# Patient Record
Sex: FEMALE | Race: WHITE | Employment: OTHER | URBAN - METROPOLITAN AREA
[De-identification: names, ages, dates, MRNs, and addresses within clinical notes are randomized per-mention and may not be internally consistent; named-entity substitution may affect disease eponyms.]

---

## 2017-01-01 ENCOUNTER — HOSPITAL ENCOUNTER (OUTPATIENT)
Dept: CT IMAGING | Age: 72
Discharge: HOME | End: 2017-10-03
Attending: INTERNAL MEDICINE
Payer: MEDICARE

## 2017-01-01 ENCOUNTER — HOSPITAL ENCOUNTER (OUTPATIENT)
Dept: LAB | Age: 72
Discharge: HOME | End: 2017-09-26
Payer: MEDICARE

## 2017-01-01 ENCOUNTER — PATIENT OUTREACH (OUTPATIENT)
Dept: CASE MANAGEMENT | Age: 72
End: 2017-01-01

## 2017-01-01 ENCOUNTER — HOSPITAL ENCOUNTER (OUTPATIENT)
Dept: LAB | Age: 72
Discharge: HOME | End: 2017-10-12
Payer: MEDICARE

## 2017-01-01 ENCOUNTER — HOSPITAL ENCOUNTER (OUTPATIENT)
Dept: LAB | Age: 72
Discharge: HOME | End: 2017-11-02
Payer: MEDICARE

## 2017-01-01 ENCOUNTER — HOSPITAL ENCOUNTER (OUTPATIENT)
Dept: LAB | Age: 72
Discharge: HOME | End: 2017-11-21
Payer: MEDICARE

## 2017-01-01 ENCOUNTER — HOSPITAL ENCOUNTER (OUTPATIENT)
Dept: LAB | Age: 72
Discharge: HOME | End: 2017-12-12
Payer: MEDICARE

## 2017-01-01 ENCOUNTER — HOSPITAL ENCOUNTER (OUTPATIENT)
Dept: INFUSION THERAPY | Age: 72
Discharge: HOME | End: 2017-12-12
Payer: MEDICARE

## 2017-01-01 ENCOUNTER — APPOINTMENT (OUTPATIENT)
Dept: INFUSION THERAPY | Age: 72
End: 2017-01-01
Payer: MEDICARE

## 2017-01-01 ENCOUNTER — HOSPITAL ENCOUNTER (OUTPATIENT)
Dept: LAB | Age: 72
Discharge: HOME | End: 2017-08-29
Payer: MEDICARE

## 2017-01-01 ENCOUNTER — HOSPITAL ENCOUNTER (OUTPATIENT)
Dept: LAB | Age: 72
Discharge: HOME | End: 2017-08-15
Payer: MEDICARE

## 2017-01-01 ENCOUNTER — HOSPITAL ENCOUNTER (OUTPATIENT)
Dept: INFUSION THERAPY | Age: 72
Discharge: HOME | End: 2017-08-29
Payer: MEDICARE

## 2017-01-01 ENCOUNTER — HOSPITAL ENCOUNTER (OUTPATIENT)
Dept: INFUSION THERAPY | Age: 72
Discharge: HOME | End: 2017-11-21
Payer: MEDICARE

## 2017-01-01 ENCOUNTER — HOSPITAL ENCOUNTER (OUTPATIENT)
Dept: INFUSION THERAPY | Age: 72
Discharge: HOME | End: 2017-09-26
Payer: MEDICARE

## 2017-01-01 ENCOUNTER — HOSPITAL ENCOUNTER (OUTPATIENT)
Dept: INFUSION THERAPY | Age: 72
Discharge: HOME | End: 2017-10-12
Payer: MEDICARE

## 2017-01-01 ENCOUNTER — HOSPITAL ENCOUNTER (OUTPATIENT)
Dept: INFUSION THERAPY | Age: 72
Discharge: HOME | End: 2017-11-02
Payer: MEDICARE

## 2017-01-01 ENCOUNTER — HOSPITAL ENCOUNTER (OUTPATIENT)
Dept: CT IMAGING | Age: 72
Discharge: HOME | End: 2017-12-04
Attending: INTERNAL MEDICINE
Payer: MEDICARE

## 2017-01-01 ENCOUNTER — HOSPITAL ENCOUNTER (OUTPATIENT)
Dept: INFUSION THERAPY | Age: 72
Discharge: HOME | End: 2017-08-22
Payer: MEDICARE

## 2017-01-01 ENCOUNTER — HOSPITAL ENCOUNTER (OUTPATIENT)
Dept: LAB | Age: 72
Discharge: HOME | End: 2017-09-12
Payer: MEDICARE

## 2017-01-01 ENCOUNTER — HOSPITAL ENCOUNTER (OUTPATIENT)
Dept: INFUSION THERAPY | Age: 72
Discharge: HOME | End: 2017-09-12
Payer: MEDICARE

## 2017-01-01 ENCOUNTER — HOSPITAL ENCOUNTER (OUTPATIENT)
Dept: INFUSION THERAPY | Age: 72
Discharge: HOME | End: 2017-08-15
Payer: MEDICARE

## 2017-01-01 ENCOUNTER — HOSPITAL ENCOUNTER (OUTPATIENT)
Dept: LAB | Age: 72
Discharge: HOME | End: 2017-08-22
Payer: MEDICARE

## 2017-01-01 VITALS — WEIGHT: 139 LBS | HEIGHT: 64 IN | BODY MASS INDEX: 23.73 KG/M2

## 2017-01-01 VITALS — OXYGEN SATURATION: 96 %

## 2017-01-01 DIAGNOSIS — C34.91 NON-SMALL CELL CANCER OF RIGHT LUNG (HCC): ICD-10-CM

## 2017-01-01 DIAGNOSIS — R11.0 NAUSEA: ICD-10-CM

## 2017-01-01 DIAGNOSIS — C34.90 NON-SMALL CELL LUNG CANCER, UNSPECIFIED LATERALITY (HCC): Chronic | ICD-10-CM

## 2017-01-01 DIAGNOSIS — E87.6 HYPOKALEMIA: ICD-10-CM

## 2017-01-01 DIAGNOSIS — C34.91 NON-SMALL CELL LUNG CANCER, RIGHT (HCC): ICD-10-CM

## 2017-01-01 DIAGNOSIS — C34.91 NON-SMALL CELL CANCER OF RIGHT LUNG (HCC): Chronic | ICD-10-CM

## 2017-01-01 DIAGNOSIS — C34.91 NON-SMALL CELL LUNG CANCER, RIGHT (HCC): Chronic | ICD-10-CM

## 2017-01-01 DIAGNOSIS — C34.90 MALIGNANT NEOPLASM OF LUNG, UNSPECIFIED LATERALITY, UNSPECIFIED PART OF LUNG (HCC): ICD-10-CM

## 2017-01-01 LAB
ALBUMIN SERPL BCP-MCNC: 3.1 G/DL (ref 3.2–4.6)
ALBUMIN SERPL-MCNC: 2.9 G/DL (ref 3.2–4.6)
ALBUMIN SERPL-MCNC: 3 G/DL (ref 3.2–4.6)
ALBUMIN SERPL-MCNC: 3 G/DL (ref 3.2–4.6)
ALBUMIN SERPL-MCNC: 3.1 G/DL (ref 3.2–4.6)
ALBUMIN SERPL-MCNC: 3.2 G/DL (ref 3.2–4.6)
ALBUMIN SERPL-MCNC: 3.2 G/DL (ref 3.2–4.6)
ALBUMIN/GLOB SERPL: 0.9 {RATIO} (ref 1.2–3.5)
ALBUMIN/GLOB SERPL: 1 {RATIO} (ref 1.2–3.5)
ALP SERPL-CCNC: 72 U/L (ref 50–136)
ALP SERPL-CCNC: 72 U/L (ref 50–136)
ALP SERPL-CCNC: 73 U/L (ref 50–136)
ALP SERPL-CCNC: 77 U/L (ref 50–136)
ALP SERPL-CCNC: 79 U/L (ref 50–136)
ALP SERPL-CCNC: 79 U/L (ref 50–136)
ALP SERPL-CCNC: 82 U/L (ref 50–136)
ALP SERPL-CCNC: 82 U/L (ref 50–136)
ALP SERPL-CCNC: 87 U/L (ref 50–136)
ALT SERPL-CCNC: 17 U/L (ref 12–65)
ALT SERPL-CCNC: 20 U/L (ref 12–65)
ALT SERPL-CCNC: 25 U/L (ref 12–65)
ALT SERPL-CCNC: 29 U/L (ref 12–65)
ALT SERPL-CCNC: 33 U/L (ref 12–65)
ALT SERPL-CCNC: 33 U/L (ref 12–65)
ALT SERPL-CCNC: 37 U/L (ref 12–65)
ANION GAP BLD CALC-SCNC: 5 MMOL/L (ref 7–16)
ANION GAP SERPL CALC-SCNC: 4 MMOL/L (ref 7–16)
ANION GAP SERPL CALC-SCNC: 4 MMOL/L (ref 7–16)
ANION GAP SERPL CALC-SCNC: 5 MMOL/L (ref 7–16)
ANION GAP SERPL CALC-SCNC: 6 MMOL/L (ref 7–16)
ANION GAP SERPL CALC-SCNC: 7 MMOL/L (ref 7–16)
ANION GAP SERPL CALC-SCNC: 7 MMOL/L (ref 7–16)
ANION GAP SERPL CALC-SCNC: 8 MMOL/L (ref 7–16)
ANION GAP SERPL CALC-SCNC: 8 MMOL/L (ref 7–16)
AST SERPL W P-5'-P-CCNC: 16 U/L (ref 15–37)
AST SERPL-CCNC: 13 U/L (ref 15–37)
AST SERPL-CCNC: 15 U/L (ref 15–37)
AST SERPL-CCNC: 17 U/L (ref 15–37)
AST SERPL-CCNC: 21 U/L (ref 15–37)
AST SERPL-CCNC: 22 U/L (ref 15–37)
AST SERPL-CCNC: 22 U/L (ref 15–37)
AST SERPL-CCNC: 24 U/L (ref 15–37)
AST SERPL-CCNC: 26 U/L (ref 15–37)
BASOPHILS # BLD AUTO: 0 K/UL (ref 0–0.2)
BASOPHILS # BLD: 0 % (ref 0–2)
BASOPHILS # BLD: 0 K/UL (ref 0–0.2)
BASOPHILS NFR BLD: 0 % (ref 0–2)
BASOPHILS NFR BLD: 1 % (ref 0–2)
BILIRUB SERPL-MCNC: 0.3 MG/DL (ref 0.2–1.1)
BILIRUB SERPL-MCNC: 0.4 MG/DL (ref 0.2–1.1)
BILIRUB SERPL-MCNC: 0.5 MG/DL (ref 0.2–1.1)
BILIRUB SERPL-MCNC: 0.7 MG/DL (ref 0.2–1.1)
BILIRUB SERPL-MCNC: 0.7 MG/DL (ref 0.2–1.1)
BUN SERPL-MCNC: 10 MG/DL (ref 8–23)
BUN SERPL-MCNC: 11 MG/DL (ref 8–23)
BUN SERPL-MCNC: 11 MG/DL (ref 8–23)
BUN SERPL-MCNC: 12 MG/DL (ref 8–23)
CALCIUM SERPL-MCNC: 8.2 MG/DL (ref 8.3–10.4)
CALCIUM SERPL-MCNC: 8.2 MG/DL (ref 8.3–10.4)
CALCIUM SERPL-MCNC: 8.4 MG/DL (ref 8.3–10.4)
CALCIUM SERPL-MCNC: 8.4 MG/DL (ref 8.3–10.4)
CALCIUM SERPL-MCNC: 8.5 MG/DL (ref 8.3–10.4)
CALCIUM SERPL-MCNC: 8.6 MG/DL (ref 8.3–10.4)
CALCIUM SERPL-MCNC: 8.7 MG/DL (ref 8.3–10.4)
CALCIUM SERPL-MCNC: 8.8 MG/DL (ref 8.3–10.4)
CALCIUM SERPL-MCNC: 8.9 MG/DL (ref 8.3–10.4)
CHLORIDE SERPL-SCNC: 102 MMOL/L (ref 98–107)
CHLORIDE SERPL-SCNC: 104 MMOL/L (ref 98–107)
CHLORIDE SERPL-SCNC: 105 MMOL/L (ref 98–107)
CHLORIDE SERPL-SCNC: 106 MMOL/L (ref 98–107)
CHLORIDE SERPL-SCNC: 106 MMOL/L (ref 98–107)
CHLORIDE SERPL-SCNC: 107 MMOL/L (ref 98–107)
CO2 SERPL-SCNC: 29 MMOL/L (ref 21–32)
CO2 SERPL-SCNC: 29 MMOL/L (ref 21–32)
CO2 SERPL-SCNC: 30 MMOL/L (ref 21–32)
CO2 SERPL-SCNC: 31 MMOL/L (ref 21–32)
CO2 SERPL-SCNC: 31 MMOL/L (ref 21–32)
CO2 SERPL-SCNC: 32 MMOL/L (ref 21–32)
CREAT SERPL-MCNC: 0.5 MG/DL (ref 0.6–1)
CREAT SERPL-MCNC: 0.57 MG/DL (ref 0.6–1)
CREAT SERPL-MCNC: 0.58 MG/DL (ref 0.6–1)
CREAT SERPL-MCNC: 0.6 MG/DL (ref 0.6–1)
CREAT SERPL-MCNC: 0.6 MG/DL (ref 0.6–1)
CREAT SERPL-MCNC: 0.63 MG/DL (ref 0.6–1)
CREAT SERPL-MCNC: 0.75 MG/DL (ref 0.6–1)
CREAT SERPL-MCNC: 0.79 MG/DL (ref 0.6–1)
CREAT SERPL-MCNC: 0.83 MG/DL (ref 0.6–1)
DIFFERENTIAL METHOD BLD: ABNORMAL
EOSINOPHIL # BLD: 0 K/UL (ref 0–0.8)
EOSINOPHIL # BLD: 0.1 K/UL (ref 0–0.8)
EOSINOPHIL NFR BLD: 0 % (ref 0.5–7.8)
EOSINOPHIL NFR BLD: 1 % (ref 0.5–7.8)
ERYTHROCYTE [DISTWIDTH] IN BLOOD BY AUTOMATED COUNT: 13.6 % (ref 11.9–14.6)
ERYTHROCYTE [DISTWIDTH] IN BLOOD BY AUTOMATED COUNT: 13.7 % (ref 11.9–14.6)
ERYTHROCYTE [DISTWIDTH] IN BLOOD BY AUTOMATED COUNT: 14.6 % (ref 11.9–14.6)
ERYTHROCYTE [DISTWIDTH] IN BLOOD BY AUTOMATED COUNT: 15 % (ref 11.9–14.6)
ERYTHROCYTE [DISTWIDTH] IN BLOOD BY AUTOMATED COUNT: 15.8 % (ref 11.9–14.6)
ERYTHROCYTE [DISTWIDTH] IN BLOOD BY AUTOMATED COUNT: 16.5 % (ref 11.9–14.6)
ERYTHROCYTE [DISTWIDTH] IN BLOOD BY AUTOMATED COUNT: 17.7 % (ref 11.9–14.6)
ERYTHROCYTE [DISTWIDTH] IN BLOOD BY AUTOMATED COUNT: 18.7 % (ref 11.9–14.6)
ERYTHROCYTE [DISTWIDTH] IN BLOOD BY AUTOMATED COUNT: 18.9 % (ref 11.9–14.6)
GLOBULIN SER CALC-MCNC: 3 G/DL (ref 2.3–3.5)
GLOBULIN SER CALC-MCNC: 3.1 G/DL (ref 2.3–3.5)
GLOBULIN SER CALC-MCNC: 3.2 G/DL (ref 2.3–3.5)
GLOBULIN SER CALC-MCNC: 3.3 G/DL (ref 2.3–3.5)
GLUCOSE SERPL-MCNC: 107 MG/DL (ref 65–100)
GLUCOSE SERPL-MCNC: 116 MG/DL (ref 65–100)
GLUCOSE SERPL-MCNC: 127 MG/DL (ref 65–100)
GLUCOSE SERPL-MCNC: 130 MG/DL (ref 65–100)
GLUCOSE SERPL-MCNC: 135 MG/DL (ref 65–100)
GLUCOSE SERPL-MCNC: 166 MG/DL (ref 65–100)
GLUCOSE SERPL-MCNC: 88 MG/DL (ref 65–100)
GLUCOSE SERPL-MCNC: 89 MG/DL (ref 65–100)
GLUCOSE SERPL-MCNC: 96 MG/DL (ref 65–100)
HCT VFR BLD AUTO: 30.6 % (ref 35.8–46.3)
HCT VFR BLD AUTO: 31.6 % (ref 35.8–46.3)
HCT VFR BLD AUTO: 32.9 % (ref 35.8–46.3)
HCT VFR BLD AUTO: 34.1 % (ref 35.8–46.3)
HCT VFR BLD AUTO: 34.3 % (ref 35.8–46.3)
HCT VFR BLD AUTO: 34.3 % (ref 35.8–46.3)
HCT VFR BLD AUTO: 37.2 % (ref 35.8–46.3)
HCT VFR BLD AUTO: 37.5 % (ref 35.8–46.3)
HCT VFR BLD AUTO: 37.5 % (ref 35.8–46.3)
HGB BLD-MCNC: 10.1 G/DL (ref 11.7–15.4)
HGB BLD-MCNC: 10.5 G/DL (ref 11.7–15.4)
HGB BLD-MCNC: 11 G/DL (ref 11.7–15.4)
HGB BLD-MCNC: 11.3 G/DL (ref 11.7–15.4)
HGB BLD-MCNC: 12.1 G/DL (ref 11.7–15.4)
HGB BLD-MCNC: 12.2 G/DL (ref 11.7–15.4)
HGB BLD-MCNC: 12.2 G/DL (ref 11.7–15.4)
LYMPHOCYTES # BLD AUTO: 32 % (ref 13–44)
LYMPHOCYTES # BLD: 0.8 K/UL (ref 0.5–4.6)
LYMPHOCYTES # BLD: 1.1 K/UL (ref 0.5–4.6)
LYMPHOCYTES # BLD: 1.2 K/UL (ref 0.5–4.6)
LYMPHOCYTES # BLD: 1.5 K/UL (ref 0.5–4.6)
LYMPHOCYTES # BLD: 1.6 K/UL (ref 0.5–4.6)
LYMPHOCYTES # BLD: 1.8 K/UL (ref 0.5–4.6)
LYMPHOCYTES # BLD: 2 K/UL (ref 0.5–4.6)
LYMPHOCYTES # BLD: 2.1 K/UL (ref 0.5–4.6)
LYMPHOCYTES # BLD: 2.1 K/UL (ref 0.5–4.6)
LYMPHOCYTES NFR BLD: 12 % (ref 13–44)
LYMPHOCYTES NFR BLD: 19 % (ref 13–44)
LYMPHOCYTES NFR BLD: 21 % (ref 13–44)
LYMPHOCYTES NFR BLD: 24 % (ref 13–44)
LYMPHOCYTES NFR BLD: 25 % (ref 13–44)
LYMPHOCYTES NFR BLD: 25 % (ref 13–44)
MCH RBC QN AUTO: 35 PG (ref 26.1–32.9)
MCH RBC QN AUTO: 35.1 PG (ref 26.1–32.9)
MCH RBC QN AUTO: 35.6 PG (ref 26.1–32.9)
MCH RBC QN AUTO: 35.9 PG (ref 26.1–32.9)
MCH RBC QN AUTO: 36.6 PG (ref 26.1–32.9)
MCH RBC QN AUTO: 36.7 PG (ref 26.1–32.9)
MCH RBC QN AUTO: 37.3 PG (ref 26.1–32.9)
MCH RBC QN AUTO: 37.7 PG (ref 26.1–32.9)
MCH RBC QN AUTO: 38 PG (ref 26.1–32.9)
MCHC RBC AUTO-ENTMCNC: 32.5 G/DL (ref 31.4–35)
MCHC RBC AUTO-ENTMCNC: 32.9 G/DL (ref 31.4–35)
MCHC RBC AUTO-ENTMCNC: 32.9 G/DL (ref 31.4–35)
MCHC RBC AUTO-ENTMCNC: 33 G/DL (ref 31.4–35)
MCHC RBC AUTO-ENTMCNC: 33.1 G/DL (ref 31.4–35)
MCHC RBC AUTO-ENTMCNC: 33.2 G/DL (ref 31.4–35)
MCHC RBC AUTO-ENTMCNC: 33.4 G/DL (ref 31.4–35)
MCV RBC AUTO: 106.5 FL (ref 79.6–97.8)
MCV RBC AUTO: 107.4 FL (ref 79.6–97.8)
MCV RBC AUTO: 107.6 FL (ref 79.6–97.8)
MCV RBC AUTO: 110.3 FL (ref 79.6–97.8)
MCV RBC AUTO: 110.5 FL (ref 79.6–97.8)
MCV RBC AUTO: 111.5 FL (ref 79.6–97.8)
MCV RBC AUTO: 112.4 FL (ref 79.6–97.8)
MCV RBC AUTO: 114.3 FL (ref 79.6–97.8)
MCV RBC AUTO: 115 FL (ref 79.6–97.8)
MONOCYTES # BLD: 0.3 K/UL (ref 0.1–1.3)
MONOCYTES # BLD: 0.4 K/UL (ref 0.1–1.3)
MONOCYTES # BLD: 0.5 K/UL (ref 0.1–1.3)
MONOCYTES # BLD: 0.6 K/UL (ref 0.1–1.3)
MONOCYTES # BLD: 0.7 K/UL (ref 0.1–1.3)
MONOCYTES # BLD: 0.8 K/UL (ref 0.1–1.3)
MONOCYTES NFR BLD AUTO: 7 % (ref 4–12)
MONOCYTES NFR BLD: 14 % (ref 4–12)
MONOCYTES NFR BLD: 6 % (ref 4–12)
MONOCYTES NFR BLD: 6 % (ref 4–12)
MONOCYTES NFR BLD: 7 % (ref 4–12)
MONOCYTES NFR BLD: 8 % (ref 4–12)
MONOCYTES NFR BLD: 9 % (ref 4–12)
NEUTS SEG # BLD: 3.8 K/UL (ref 1.7–8.2)
NEUTS SEG # BLD: 4.1 K/UL (ref 1.7–8.2)
NEUTS SEG # BLD: 4.1 K/UL (ref 1.7–8.2)
NEUTS SEG # BLD: 5 K/UL (ref 1.7–8.2)
NEUTS SEG # BLD: 5.3 K/UL (ref 1.7–8.2)
NEUTS SEG # BLD: 5.3 K/UL (ref 1.7–8.2)
NEUTS SEG # BLD: 5.6 K/UL (ref 1.7–8.2)
NEUTS SEG # BLD: 5.8 K/UL (ref 1.7–8.2)
NEUTS SEG # BLD: 6.1 K/UL (ref 1.7–8.2)
NEUTS SEG NFR BLD AUTO: 61 % (ref 43–78)
NEUTS SEG NFR BLD: 66 % (ref 43–78)
NEUTS SEG NFR BLD: 67 % (ref 43–78)
NEUTS SEG NFR BLD: 72 % (ref 43–78)
NEUTS SEG NFR BLD: 72 % (ref 43–78)
NEUTS SEG NFR BLD: 74 % (ref 43–78)
NEUTS SEG NFR BLD: 82 % (ref 43–78)
NRBC # BLD: 0 K/UL (ref 0–0.2)
NRBC # BLD: 0.01 K/UL (ref 0–0.2)
PLATELET # BLD AUTO: 108 K/UL (ref 150–450)
PLATELET # BLD AUTO: 118 K/UL (ref 150–450)
PLATELET # BLD AUTO: 127 K/UL (ref 150–450)
PLATELET # BLD AUTO: 128 K/UL (ref 150–450)
PLATELET # BLD AUTO: 130 K/UL (ref 150–450)
PLATELET # BLD AUTO: 148 K/UL (ref 150–450)
PLATELET # BLD AUTO: 154 K/UL (ref 150–450)
PLATELET # BLD AUTO: 65 K/UL (ref 150–450)
PLATELET # BLD AUTO: 92 K/UL (ref 150–450)
PMV BLD AUTO: 8.3 FL (ref 10.8–14.1)
PMV BLD AUTO: 8.4 FL (ref 10.8–14.1)
PMV BLD AUTO: 8.8 FL (ref 10.8–14.1)
PMV BLD AUTO: 8.9 FL (ref 10.8–14.1)
PMV BLD AUTO: 9 FL (ref 10.8–14.1)
PMV BLD AUTO: 9 FL (ref 10.8–14.1)
PMV BLD AUTO: 9.2 FL (ref 10.8–14.1)
PMV BLD AUTO: 9.5 FL (ref 10.8–14.1)
PMV BLD AUTO: 9.6 FL (ref 10.8–14.1)
POTASSIUM SERPL-SCNC: 2.9 MMOL/L (ref 3.5–5.1)
POTASSIUM SERPL-SCNC: 3.1 MMOL/L (ref 3.5–5.1)
POTASSIUM SERPL-SCNC: 3.3 MMOL/L (ref 3.5–5.1)
POTASSIUM SERPL-SCNC: 3.5 MMOL/L (ref 3.5–5.1)
POTASSIUM SERPL-SCNC: 3.8 MMOL/L (ref 3.5–5.1)
PROT SERPL-MCNC: 6 G/DL (ref 6.3–8.2)
PROT SERPL-MCNC: 6.1 G/DL (ref 6.3–8.2)
PROT SERPL-MCNC: 6.1 G/DL (ref 6.3–8.2)
PROT SERPL-MCNC: 6.2 G/DL (ref 6.3–8.2)
PROT SERPL-MCNC: 6.3 G/DL (ref 6.3–8.2)
PROT SERPL-MCNC: 6.4 G/DL (ref 6.3–8.2)
PROT SERPL-MCNC: 6.5 G/DL (ref 6.3–8.2)
RBC # BLD AUTO: 2.66 M/UL (ref 4.05–5.25)
RBC # BLD AUTO: 2.86 M/UL (ref 4.05–5.25)
RBC # BLD AUTO: 2.95 M/UL (ref 4.05–5.25)
RBC # BLD AUTO: 3 M/UL (ref 4.05–5.25)
RBC # BLD AUTO: 3.17 M/UL (ref 4.05–5.25)
RBC # BLD AUTO: 3.22 M/UL (ref 4.05–5.25)
RBC # BLD AUTO: 3.31 M/UL (ref 4.05–5.25)
RBC # BLD AUTO: 3.4 M/UL (ref 4.05–5.25)
RBC # BLD AUTO: 3.49 M/UL (ref 4.05–5.25)
SODIUM SERPL-SCNC: 139 MMOL/L (ref 136–145)
SODIUM SERPL-SCNC: 139 MMOL/L (ref 136–145)
SODIUM SERPL-SCNC: 140 MMOL/L (ref 136–145)
SODIUM SERPL-SCNC: 140 MMOL/L (ref 136–145)
SODIUM SERPL-SCNC: 141 MMOL/L (ref 136–145)
SODIUM SERPL-SCNC: 141 MMOL/L (ref 136–145)
SODIUM SERPL-SCNC: 142 MMOL/L (ref 136–145)
SODIUM SERPL-SCNC: 142 MMOL/L (ref 136–145)
SODIUM SERPL-SCNC: 144 MMOL/L (ref 136–145)
WBC # BLD AUTO: 5.6 K/UL (ref 4.3–11.1)
WBC # BLD AUTO: 5.8 K/UL (ref 4.3–11.1)
WBC # BLD AUTO: 6.5 K/UL (ref 4.3–11.1)
WBC # BLD AUTO: 6.7 K/UL (ref 4.3–11.1)
WBC # BLD AUTO: 7.5 K/UL (ref 4.3–11.1)
WBC # BLD AUTO: 7.9 K/UL (ref 4.3–11.1)
WBC # BLD AUTO: 7.9 K/UL (ref 4.3–11.1)
WBC # BLD AUTO: 8.4 K/UL (ref 4.3–11.1)
WBC # BLD AUTO: 8.5 K/UL (ref 4.3–11.1)

## 2017-01-01 PROCEDURE — 74011636320 HC RX REV CODE- 636/320: Performed by: INTERNAL MEDICINE

## 2017-01-01 PROCEDURE — 85025 COMPLETE CBC W/AUTO DIFF WBC: CPT | Performed by: INTERNAL MEDICINE

## 2017-01-01 PROCEDURE — 96413 CHEMO IV INFUSION 1 HR: CPT

## 2017-01-01 PROCEDURE — 74011000258 HC RX REV CODE- 258: Performed by: INTERNAL MEDICINE

## 2017-01-01 PROCEDURE — 74011250636 HC RX REV CODE- 250/636: Performed by: NURSE PRACTITIONER

## 2017-01-01 PROCEDURE — 96375 TX/PRO/DX INJ NEW DRUG ADDON: CPT

## 2017-01-01 PROCEDURE — 74177 CT ABD & PELVIS W/CONTRAST: CPT

## 2017-01-01 PROCEDURE — 96366 THER/PROPH/DIAG IV INF ADDON: CPT

## 2017-01-01 PROCEDURE — 74011250636 HC RX REV CODE- 250/636: Performed by: INTERNAL MEDICINE

## 2017-01-01 PROCEDURE — 96367 TX/PROPH/DG ADDL SEQ IV INF: CPT

## 2017-01-01 PROCEDURE — 74011250637 HC RX REV CODE- 250/637: Performed by: INTERNAL MEDICINE

## 2017-01-01 PROCEDURE — 80053 COMPREHEN METABOLIC PANEL: CPT | Performed by: INTERNAL MEDICINE

## 2017-01-01 PROCEDURE — 74011000258 HC RX REV CODE- 258: Performed by: NURSE PRACTITIONER

## 2017-01-01 PROCEDURE — 74011250636 HC RX REV CODE- 250/636: Performed by: RADIOLOGY

## 2017-01-01 PROCEDURE — 96523 IRRIG DRUG DELIVERY DEVICE: CPT

## 2017-01-01 RX ORDER — HEPARIN 100 UNIT/ML
300-500 SYRINGE INTRAVENOUS AS NEEDED
Status: DISPENSED | OUTPATIENT
Start: 2017-01-01 | End: 2017-01-01

## 2017-01-01 RX ORDER — HEPARIN 100 UNIT/ML
300-500 SYRINGE INTRAVENOUS AS NEEDED
Status: CANCELLED
Start: 2017-01-01

## 2017-01-01 RX ORDER — ONDANSETRON 2 MG/ML
8 INJECTION INTRAMUSCULAR; INTRAVENOUS ONCE
Status: COMPLETED | OUTPATIENT
Start: 2017-01-01 | End: 2017-01-01

## 2017-01-01 RX ORDER — SODIUM CHLORIDE 0.9 % (FLUSH) 0.9 %
10 SYRINGE (ML) INJECTION AS NEEDED
Status: ACTIVE | OUTPATIENT
Start: 2017-01-01 | End: 2017-01-01

## 2017-01-01 RX ORDER — ALBUTEROL SULFATE 0.83 MG/ML
2.5 SOLUTION RESPIRATORY (INHALATION) AS NEEDED
Status: DISPENSED | OUTPATIENT
Start: 2017-01-01 | End: 2017-01-01

## 2017-01-01 RX ORDER — METOCLOPRAMIDE HYDROCHLORIDE 5 MG/ML
10 INJECTION INTRAMUSCULAR; INTRAVENOUS ONCE
Status: CANCELLED
Start: 2017-01-01 | End: 2017-01-01

## 2017-01-01 RX ORDER — DEXAMETHASONE SODIUM PHOSPHATE 100 MG/10ML
10 INJECTION INTRAMUSCULAR; INTRAVENOUS ONCE
Status: CANCELLED
Start: 2017-01-01 | End: 2017-01-01

## 2017-01-01 RX ORDER — ALBUTEROL SULFATE 0.83 MG/ML
2.5 SOLUTION RESPIRATORY (INHALATION) AS NEEDED
Status: CANCELLED
Start: 2017-01-01

## 2017-01-01 RX ORDER — SODIUM CHLORIDE 0.9 % (FLUSH) 0.9 %
10 SYRINGE (ML) INJECTION
Status: COMPLETED | OUTPATIENT
Start: 2017-01-01 | End: 2017-01-01

## 2017-01-01 RX ORDER — LORAZEPAM 2 MG/ML
0.5 INJECTION INTRAMUSCULAR
Status: ACTIVE | OUTPATIENT
Start: 2017-01-01 | End: 2017-01-01

## 2017-01-01 RX ORDER — SODIUM CHLORIDE 0.9 % (FLUSH) 0.9 %
10-30 SYRINGE (ML) INJECTION AS NEEDED
Status: DISCONTINUED | OUTPATIENT
Start: 2017-01-01 | End: 2017-01-01 | Stop reason: HOSPADM

## 2017-01-01 RX ORDER — METOCLOPRAMIDE HYDROCHLORIDE 5 MG/ML
10 INJECTION INTRAMUSCULAR; INTRAVENOUS ONCE
Status: COMPLETED | OUTPATIENT
Start: 2017-01-01 | End: 2017-01-01

## 2017-01-01 RX ORDER — DIPHENHYDRAMINE HYDROCHLORIDE 50 MG/ML
25 INJECTION, SOLUTION INTRAMUSCULAR; INTRAVENOUS
Status: DISPENSED | OUTPATIENT
Start: 2017-01-01 | End: 2017-01-01

## 2017-01-01 RX ORDER — DEXAMETHASONE SODIUM PHOSPHATE 100 MG/10ML
10 INJECTION INTRAMUSCULAR; INTRAVENOUS ONCE
Status: COMPLETED | OUTPATIENT
Start: 2017-01-01 | End: 2017-01-01

## 2017-01-01 RX ORDER — HEPARIN SODIUM 1000 [USP'U]/ML
2000 INJECTION, SOLUTION INTRAVENOUS; SUBCUTANEOUS AS NEEDED
Status: CANCELLED
Start: 2017-01-01

## 2017-01-01 RX ORDER — ACETAMINOPHEN 325 MG/1
650 TABLET ORAL AS NEEDED
Status: CANCELLED
Start: 2017-01-01

## 2017-01-01 RX ORDER — DIPHENHYDRAMINE HCL 50 MG
50 CAPSULE ORAL
Status: DISCONTINUED | OUTPATIENT
Start: 2017-01-01 | End: 2017-01-01 | Stop reason: HOSPADM

## 2017-01-01 RX ORDER — EPINEPHRINE 1 MG/ML
0.3 INJECTION, SOLUTION, CONCENTRATE INTRAVENOUS AS NEEDED
Status: CANCELLED | OUTPATIENT
Start: 2017-01-01

## 2017-01-01 RX ORDER — EPINEPHRINE 1 MG/ML
0.3 INJECTION, SOLUTION, CONCENTRATE INTRAVENOUS AS NEEDED
Status: DISPENSED | OUTPATIENT
Start: 2017-01-01 | End: 2017-01-01

## 2017-01-01 RX ORDER — SODIUM CHLORIDE 0.9 % (FLUSH) 0.9 %
10 SYRINGE (ML) INJECTION AS NEEDED
Status: CANCELLED
Start: 2017-01-01

## 2017-01-01 RX ORDER — DIPHENHYDRAMINE HYDROCHLORIDE 50 MG/ML
50 INJECTION, SOLUTION INTRAMUSCULAR; INTRAVENOUS AS NEEDED
Status: ACTIVE | OUTPATIENT
Start: 2017-01-01 | End: 2017-01-01

## 2017-01-01 RX ORDER — HEPARIN 100 UNIT/ML
500 SYRINGE INTRAVENOUS AS NEEDED
Status: DISCONTINUED | OUTPATIENT
Start: 2017-01-01 | End: 2017-01-01 | Stop reason: HOSPADM

## 2017-01-01 RX ORDER — LORAZEPAM 2 MG/ML
0.5 INJECTION INTRAMUSCULAR
Status: CANCELLED
Start: 2017-01-01

## 2017-01-01 RX ORDER — DIPHENHYDRAMINE HYDROCHLORIDE 50 MG/ML
50 INJECTION, SOLUTION INTRAMUSCULAR; INTRAVENOUS AS NEEDED
Status: CANCELLED
Start: 2017-01-01

## 2017-01-01 RX ORDER — HEPARIN 100 UNIT/ML
300 SYRINGE INTRAVENOUS AS NEEDED
Status: DISPENSED | OUTPATIENT
Start: 2017-01-01 | End: 2017-01-01

## 2017-01-01 RX ORDER — HYDROCORTISONE SODIUM SUCCINATE 100 MG/2ML
100 INJECTION, POWDER, FOR SOLUTION INTRAMUSCULAR; INTRAVENOUS AS NEEDED
Status: CANCELLED | OUTPATIENT
Start: 2017-01-01

## 2017-01-01 RX ORDER — DIPHENHYDRAMINE HYDROCHLORIDE 50 MG/ML
50 INJECTION, SOLUTION INTRAMUSCULAR; INTRAVENOUS ONCE
Status: COMPLETED | OUTPATIENT
Start: 2017-01-01 | End: 2017-01-01

## 2017-01-01 RX ORDER — DIPHENHYDRAMINE HYDROCHLORIDE 50 MG/ML
25 INJECTION, SOLUTION INTRAMUSCULAR; INTRAVENOUS
Status: CANCELLED
Start: 2017-01-01

## 2017-01-01 RX ORDER — POTASSIUM CHLORIDE 14.9 MG/ML
20 INJECTION INTRAVENOUS ONCE
Status: COMPLETED | OUTPATIENT
Start: 2017-01-01 | End: 2017-01-01

## 2017-01-01 RX ORDER — PROCHLORPERAZINE EDISYLATE 5 MG/ML
10 INJECTION INTRAMUSCULAR; INTRAVENOUS
Status: CANCELLED
Start: 2017-01-01

## 2017-01-01 RX ORDER — HYDROCORTISONE SODIUM SUCCINATE 100 MG/2ML
100 INJECTION, POWDER, FOR SOLUTION INTRAMUSCULAR; INTRAVENOUS AS NEEDED
Status: ACTIVE | OUTPATIENT
Start: 2017-01-01 | End: 2017-01-01

## 2017-01-01 RX ORDER — HEPARIN 100 UNIT/ML
300 SYRINGE INTRAVENOUS AS NEEDED
Status: ACTIVE | OUTPATIENT
Start: 2017-01-01 | End: 2017-01-01

## 2017-01-01 RX ORDER — ONDANSETRON 2 MG/ML
8 INJECTION INTRAMUSCULAR; INTRAVENOUS AS NEEDED
Status: CANCELLED | OUTPATIENT
Start: 2017-01-01

## 2017-01-01 RX ADMIN — SODIUM CHLORIDE 500 ML: 900 INJECTION, SOLUTION INTRAVENOUS at 14:03

## 2017-01-01 RX ADMIN — SODIUM CHLORIDE, PRESERVATIVE FREE 300 UNITS: 5 INJECTION INTRAVENOUS at 17:01

## 2017-01-01 RX ADMIN — Medication 300 UNITS: at 15:15

## 2017-01-01 RX ADMIN — SODIUM CHLORIDE, PRESERVATIVE FREE 500 UNITS: 5 INJECTION INTRAVENOUS at 16:22

## 2017-01-01 RX ADMIN — Medication 10 ML: at 15:25

## 2017-01-01 RX ADMIN — SODIUM CHLORIDE 100 ML: 900 INJECTION, SOLUTION INTRAVENOUS at 11:32

## 2017-01-01 RX ADMIN — DIPHENHYDRAMINE HYDROCHLORIDE 50 MG: 50 CAPSULE ORAL at 10:42

## 2017-01-01 RX ADMIN — Medication 10 ML: at 16:45

## 2017-01-01 RX ADMIN — Medication 10 ML: at 17:00

## 2017-01-01 RX ADMIN — IOPAMIDOL 100 ML: 755 INJECTION, SOLUTION INTRAVENOUS at 11:33

## 2017-01-01 RX ADMIN — SODIUM CHLORIDE 1008 MG: 900 INJECTION, SOLUTION INTRAVENOUS at 15:31

## 2017-01-01 RX ADMIN — ONDANSETRON 8 MG: 2 INJECTION INTRAMUSCULAR; INTRAVENOUS at 15:11

## 2017-01-01 RX ADMIN — SODIUM CHLORIDE 200 MG: 900 INJECTION, SOLUTION INTRAVENOUS at 16:42

## 2017-01-01 RX ADMIN — Medication 10 ML: at 17:26

## 2017-01-01 RX ADMIN — POTASSIUM CHLORIDE 20 MEQ: 200 INJECTION, SOLUTION INTRAVENOUS at 14:28

## 2017-01-01 RX ADMIN — DEXAMETHASONE SODIUM PHOSPHATE 10 MG: 10 INJECTION INTRAMUSCULAR; INTRAVENOUS at 14:10

## 2017-01-01 RX ADMIN — SODIUM CHLORIDE 500 ML: 900 INJECTION, SOLUTION INTRAVENOUS at 15:13

## 2017-01-01 RX ADMIN — SODIUM CHLORIDE, PRESERVATIVE FREE 500 UNITS: 5 INJECTION INTRAVENOUS at 17:27

## 2017-01-01 RX ADMIN — Medication 10 ML: at 14:50

## 2017-01-01 RX ADMIN — ONDANSETRON 8 MG: 2 INJECTION INTRAMUSCULAR; INTRAVENOUS at 15:50

## 2017-01-01 RX ADMIN — DEXAMETHASONE SODIUM PHOSPHATE 10 MG: 10 INJECTION INTRAMUSCULAR; INTRAVENOUS at 13:50

## 2017-01-01 RX ADMIN — Medication 10 ML: at 16:55

## 2017-01-01 RX ADMIN — METOCLOPRAMIDE 10 MG: 5 INJECTION, SOLUTION INTRAMUSCULAR; INTRAVENOUS at 15:25

## 2017-01-01 RX ADMIN — SODIUM CHLORIDE 500 ML: 900 INJECTION, SOLUTION INTRAVENOUS at 15:28

## 2017-01-01 RX ADMIN — ONDANSETRON 8 MG: 2 INJECTION INTRAMUSCULAR; INTRAVENOUS at 13:48

## 2017-01-01 RX ADMIN — Medication 10 ML: at 15:13

## 2017-01-01 RX ADMIN — Medication 10 ML: at 14:07

## 2017-01-01 RX ADMIN — DEXAMETHASONE SODIUM PHOSPHATE 10 MG: 10 INJECTION INTRAMUSCULAR; INTRAVENOUS at 15:22

## 2017-01-01 RX ADMIN — Medication 10 ML: at 16:32

## 2017-01-01 RX ADMIN — Medication 10 ML: at 11:32

## 2017-01-01 RX ADMIN — SODIUM CHLORIDE 200 MG: 900 INJECTION, SOLUTION INTRAVENOUS at 16:30

## 2017-01-01 RX ADMIN — Medication 20 ML: at 15:15

## 2017-01-01 RX ADMIN — SODIUM CHLORIDE 200 MG: 900 INJECTION, SOLUTION INTRAVENOUS at 15:42

## 2017-01-01 RX ADMIN — SODIUM CHLORIDE 500 ML: 900 INJECTION, SOLUTION INTRAVENOUS at 16:00

## 2017-01-01 RX ADMIN — SODIUM CHLORIDE 500 ML: 900 INJECTION, SOLUTION INTRAVENOUS at 15:05

## 2017-01-01 RX ADMIN — DEXAMETHASONE SODIUM PHOSPHATE 10 MG: 10 INJECTION INTRAMUSCULAR; INTRAVENOUS at 17:00

## 2017-01-01 RX ADMIN — ONDANSETRON 8 MG: 2 INJECTION INTRAMUSCULAR; INTRAVENOUS at 14:14

## 2017-01-01 RX ADMIN — DIATRIZOATE MEGLUMINE AND DIATRIZOATE SODIUM 15 ML: 660; 100 LIQUID ORAL; RECTAL at 11:33

## 2017-01-01 RX ADMIN — SODIUM CHLORIDE 500 ML: 900 INJECTION, SOLUTION INTRAVENOUS at 13:52

## 2017-01-01 RX ADMIN — SODIUM CHLORIDE 500 ML: 900 INJECTION, SOLUTION INTRAVENOUS at 14:08

## 2017-01-01 RX ADMIN — Medication 500 UNITS: at 15:25

## 2017-01-01 RX ADMIN — DIPHENHYDRAMINE HYDROCHLORIDE 50 MG: 50 INJECTION, SOLUTION INTRAMUSCULAR; INTRAVENOUS at 15:40

## 2017-01-01 RX ADMIN — METOCLOPRAMIDE 10 MG: 5 INJECTION, SOLUTION INTRAMUSCULAR; INTRAVENOUS at 17:05

## 2017-01-01 RX ADMIN — SODIUM CHLORIDE 200 MG: 900 INJECTION, SOLUTION INTRAVENOUS at 15:30

## 2017-01-01 RX ADMIN — SODIUM CHLORIDE 1008 MG: 900 INJECTION, SOLUTION INTRAVENOUS at 17:30

## 2017-01-01 RX ADMIN — Medication 10 ML: at 14:00

## 2017-01-01 RX ADMIN — Medication 10 ML: at 15:48

## 2017-01-01 RX ADMIN — Medication 10 ML: at 18:15

## 2017-01-01 RX ADMIN — Medication 10 ML: at 16:07

## 2017-01-01 RX ADMIN — IOPAMIDOL 100 ML: 755 INJECTION, SOLUTION INTRAVENOUS at 16:32

## 2017-01-01 RX ADMIN — SODIUM CHLORIDE, PRESERVATIVE FREE 500 UNITS: 5 INJECTION INTRAVENOUS at 16:55

## 2017-01-01 RX ADMIN — METHYLPREDNISOLONE SODIUM SUCCINATE 10 MG: 40 INJECTION, POWDER, FOR SOLUTION INTRAMUSCULAR; INTRAVENOUS at 14:16

## 2017-01-01 RX ADMIN — ONDANSETRON 8 MG: 2 INJECTION INTRAMUSCULAR; INTRAVENOUS at 14:13

## 2017-01-01 RX ADMIN — SODIUM CHLORIDE 1008 MG: 900 INJECTION, SOLUTION INTRAVENOUS at 14:51

## 2017-01-01 RX ADMIN — SODIUM CHLORIDE 100 ML: 900 INJECTION, SOLUTION INTRAVENOUS at 16:33

## 2017-01-01 RX ADMIN — SODIUM CHLORIDE 500 ML: 900 INJECTION, SOLUTION INTRAVENOUS at 16:45

## 2017-01-01 RX ADMIN — SODIUM CHLORIDE 1008 MG: 900 INJECTION, SOLUTION INTRAVENOUS at 14:14

## 2017-01-01 RX ADMIN — DIATRIZOATE MEGLUMINE AND DIATRIZOATE SODIUM 15 ML: 660; 100 LIQUID ORAL; RECTAL at 16:33

## 2017-01-01 RX ADMIN — SODIUM CHLORIDE, PRESERVATIVE FREE 500 UNITS: 5 INJECTION INTRAVENOUS at 18:15

## 2017-01-01 RX ADMIN — Medication 500 UNITS: at 14:50

## 2017-01-01 RX ADMIN — ONDANSETRON 8 MG: 2 INJECTION INTRAMUSCULAR; INTRAVENOUS at 15:24

## 2017-01-01 RX ADMIN — Medication 10 ML: at 15:02

## 2017-01-01 RX ADMIN — Medication 500 UNITS: at 16:07

## 2017-02-03 ENCOUNTER — TELEPHONE (OUTPATIENT)
Dept: CASE MANAGEMENT | Age: 72
End: 2017-02-03

## 2017-02-03 NOTE — TELEPHONE ENCOUNTER
[facility] called from THE [facility]. [phone]. She was requesting referral information. I spoke with Dr. Angelina Clark. Apt made for Wednesday, 2-8-17 at 1 PM in their [place] location. Pt notified by phone. She will f/u with Dr. Angelina Clark on Tuesday, 2-7-17 at [facility] for Biopsy results.     Dayami Bonds RN

## 2017-02-10 ENCOUNTER — HOSPITAL ENCOUNTER (OUTPATIENT)
Dept: GENERAL RADIOLOGY | Age: 72
Discharge: HOME | End: 2017-02-10
Payer: MEDICARE

## 2017-02-10 DIAGNOSIS — Z01.818 PREOPERATIVE CLEARANCE: ICD-10-CM

## 2017-02-10 PROCEDURE — 71020 XR CHEST PA LAT: CPT

## 2017-02-15 ENCOUNTER — TELEPHONE (OUTPATIENT)
Dept: ONCOLOGY | Age: 72
End: 2017-02-15

## 2017-02-22 ENCOUNTER — HOSPITAL ENCOUNTER (OUTPATIENT)
Dept: LAB | Age: 72
Discharge: HOME | End: 2017-02-22
Attending: INTERNAL MEDICINE
Payer: MEDICARE

## 2017-02-22 DIAGNOSIS — C34.91 NON-SMALL CELL LUNG CANCER, RIGHT (HCC): Chronic | ICD-10-CM

## 2017-02-22 LAB
ALBUMIN SERPL BCP-MCNC: 3.5 G/DL (ref 3.2–4.6)
ALBUMIN/GLOB SERPL: 1 {RATIO} (ref 1.2–3.5)
ALP SERPL-CCNC: 88 U/L (ref 50–136)
ALT SERPL-CCNC: 14 U/L (ref 12–65)
ANION GAP BLD CALC-SCNC: 5 MMOL/L (ref 7–16)
AST SERPL W P-5'-P-CCNC: 12 U/L (ref 15–37)
BASOPHILS # BLD AUTO: 0.1 K/UL (ref 0–0.2)
BASOPHILS # BLD: 1 % (ref 0–2)
BILIRUB SERPL-MCNC: 0.3 MG/DL (ref 0.2–1.1)
BUN SERPL-MCNC: 13 MG/DL (ref 8–23)
CALCIUM SERPL-MCNC: 9.5 MG/DL (ref 8.3–10.4)
CHLORIDE SERPL-SCNC: 104 MMOL/L (ref 98–107)
CO2 SERPL-SCNC: 33 MMOL/L (ref 23–32)
CREAT SERPL-MCNC: 0.82 MG/DL (ref 0.6–1)
DIFFERENTIAL METHOD BLD: ABNORMAL
EOSINOPHIL # BLD: 0.1 K/UL (ref 0–0.8)
EOSINOPHIL NFR BLD: 1 % (ref 0.5–7.8)
ERYTHROCYTE [DISTWIDTH] IN BLOOD BY AUTOMATED COUNT: 13.7 % (ref 11.9–14.6)
GLOBULIN SER CALC-MCNC: 3.6 G/DL (ref 2.3–3.5)
GLUCOSE SERPL-MCNC: 64 MG/DL (ref 65–100)
HCT VFR BLD AUTO: 40.4 % (ref 35.8–46.3)
HGB BLD-MCNC: 12.9 G/DL (ref 11.7–15.4)
LYMPHOCYTES # BLD AUTO: 19 % (ref 13–44)
LYMPHOCYTES # BLD: 1.6 K/UL (ref 0.5–4.6)
MCH RBC QN AUTO: 33.2 PG (ref 26.1–32.9)
MCHC RBC AUTO-ENTMCNC: 31.9 G/DL (ref 31.4–35)
MCV RBC AUTO: 103.9 FL (ref 79.6–97.8)
MONOCYTES # BLD: 0.7 K/UL (ref 0.1–1.3)
MONOCYTES NFR BLD AUTO: 8 % (ref 4–12)
NEUTS SEG # BLD: 5.9 K/UL (ref 1.7–8.2)
NEUTS SEG NFR BLD AUTO: 71 % (ref 43–78)
NRBC # BLD: 0 K/UL (ref 0–0.2)
PLATELET # BLD AUTO: 249 K/UL (ref 150–450)
PMV BLD AUTO: 7.9 FL (ref 10.8–14.1)
POTASSIUM SERPL-SCNC: 3.6 MMOL/L (ref 3.5–5.1)
PROT SERPL-MCNC: 7.1 G/DL (ref 6.3–8.2)
RBC # BLD AUTO: 3.89 M/UL (ref 4.05–5.25)
SODIUM SERPL-SCNC: 142 MMOL/L (ref 136–145)
WBC # BLD AUTO: 8.3 K/UL (ref 4.3–11.1)

## 2017-02-22 PROCEDURE — 85025 COMPLETE CBC W/AUTO DIFF WBC: CPT | Performed by: INTERNAL MEDICINE

## 2017-02-22 PROCEDURE — 36415 COLL VENOUS BLD VENIPUNCTURE: CPT | Performed by: INTERNAL MEDICINE

## 2017-02-22 PROCEDURE — 80053 COMPREHEN METABOLIC PANEL: CPT | Performed by: INTERNAL MEDICINE

## 2017-02-28 ENCOUNTER — HOSPITAL ENCOUNTER (OUTPATIENT)
Dept: PET IMAGING | Age: 72
Discharge: HOME | End: 2017-02-28
Attending: INTERNAL MEDICINE
Payer: MEDICARE

## 2017-02-28 DIAGNOSIS — C34.91 NON-SMALL CELL LUNG CANCER, RIGHT (HCC): Chronic | ICD-10-CM

## 2017-02-28 PROCEDURE — A9552 F18 FDG: HCPCS

## 2017-02-28 PROCEDURE — 74011636320 HC RX REV CODE- 636/320: Performed by: INTERNAL MEDICINE

## 2017-02-28 RX ORDER — SODIUM CHLORIDE 0.9 % (FLUSH) 0.9 %
10 SYRINGE (ML) INJECTION
Status: COMPLETED | OUTPATIENT
Start: 2017-02-28 | End: 2017-02-28

## 2017-02-28 RX ADMIN — Medication 10 ML: at 15:25

## 2017-02-28 RX ADMIN — DIATRIZOATE MEGLUMINE AND DIATRIZOATE SODIUM 5 ML: 660; 100 LIQUID ORAL; RECTAL at 15:25

## 2017-03-07 ENCOUNTER — HOSPITAL ENCOUNTER (OUTPATIENT)
Dept: LAB | Age: 72
Discharge: HOME | End: 2017-03-07

## 2017-03-07 DIAGNOSIS — C34.91 NON-SMALL CELL LUNG CANCER, RIGHT (HCC): Chronic | ICD-10-CM

## 2017-03-09 ENCOUNTER — HOSPITAL ENCOUNTER (OUTPATIENT)
Dept: INFUSION THERAPY | Age: 72
Discharge: HOME | End: 2017-03-09
Payer: MEDICARE

## 2017-03-09 ENCOUNTER — DOCUMENTATION ONLY (OUTPATIENT)
Dept: HEMATOLOGY | Age: 72
End: 2017-03-09

## 2017-03-09 VITALS
HEART RATE: 84 BPM | WEIGHT: 151.4 LBS | DIASTOLIC BLOOD PRESSURE: 73 MMHG | SYSTOLIC BLOOD PRESSURE: 128 MMHG | RESPIRATION RATE: 18 BRPM | TEMPERATURE: 98.2 F | BODY MASS INDEX: 25.19 KG/M2 | OXYGEN SATURATION: 91 %

## 2017-03-09 DIAGNOSIS — C34.91 NON-SMALL CELL LUNG CANCER, RIGHT (HCC): ICD-10-CM

## 2017-03-09 LAB
ALBUMIN SERPL BCP-MCNC: 3.1 G/DL (ref 3.2–4.6)
ALBUMIN/GLOB SERPL: 0.9 {RATIO} (ref 1.2–3.5)
ALP SERPL-CCNC: 79 U/L (ref 50–136)
ALT SERPL-CCNC: 14 U/L (ref 12–65)
ANION GAP BLD CALC-SCNC: 7 MMOL/L (ref 7–16)
AST SERPL W P-5'-P-CCNC: 11 U/L (ref 15–37)
BASOPHILS # BLD AUTO: 0 K/UL (ref 0–0.2)
BASOPHILS # BLD: 1 % (ref 0–2)
BILIRUB SERPL-MCNC: 0.3 MG/DL (ref 0.2–1.1)
BUN SERPL-MCNC: 9 MG/DL (ref 8–23)
CALCIUM SERPL-MCNC: 8.6 MG/DL (ref 8.3–10.4)
CHLORIDE SERPL-SCNC: 104 MMOL/L (ref 98–107)
CO2 SERPL-SCNC: 31 MMOL/L (ref 23–32)
CREAT SERPL-MCNC: 0.74 MG/DL (ref 0.6–1)
DIFFERENTIAL METHOD BLD: ABNORMAL
EOSINOPHIL # BLD: 0.1 K/UL (ref 0–0.8)
EOSINOPHIL NFR BLD: 3 % (ref 0.5–7.8)
ERYTHROCYTE [DISTWIDTH] IN BLOOD BY AUTOMATED COUNT: 14 % (ref 11.9–14.6)
GLOBULIN SER CALC-MCNC: 3.5 G/DL (ref 2.3–3.5)
GLUCOSE SERPL-MCNC: 172 MG/DL (ref 65–100)
HCT VFR BLD AUTO: 36.4 % (ref 35.8–46.3)
HGB BLD-MCNC: 11.7 G/DL (ref 11.7–15.4)
LYMPHOCYTES # BLD AUTO: 23 % (ref 13–44)
LYMPHOCYTES # BLD: 1.3 K/UL (ref 0.5–4.6)
MCH RBC QN AUTO: 33.1 PG (ref 26.1–32.9)
MCHC RBC AUTO-ENTMCNC: 32.1 G/DL (ref 31.4–35)
MCV RBC AUTO: 103.1 FL (ref 79.6–97.8)
MONOCYTES # BLD: 0.3 K/UL (ref 0.1–1.3)
MONOCYTES NFR BLD AUTO: 5 % (ref 4–12)
NEUTS SEG # BLD: 3.8 K/UL (ref 1.7–8.2)
NEUTS SEG NFR BLD AUTO: 69 % (ref 43–78)
NRBC # BLD: 0 K/UL (ref 0–0.2)
PLATELET # BLD AUTO: 235 K/UL (ref 150–450)
PMV BLD AUTO: 8.4 FL (ref 10.8–14.1)
POTASSIUM SERPL-SCNC: 3.3 MMOL/L (ref 3.5–5.1)
PROT SERPL-MCNC: 6.6 G/DL (ref 6.3–8.2)
RBC # BLD AUTO: 3.53 M/UL (ref 4.05–5.25)
SODIUM SERPL-SCNC: 142 MMOL/L (ref 136–145)
WBC # BLD AUTO: 5.5 K/UL (ref 4.3–11.1)

## 2017-03-09 PROCEDURE — 96413 CHEMO IV INFUSION 1 HR: CPT

## 2017-03-09 PROCEDURE — 74011000258 HC RX REV CODE- 258: Performed by: INTERNAL MEDICINE

## 2017-03-09 PROCEDURE — 96417 CHEMO IV INFUS EACH ADDL SEQ: CPT

## 2017-03-09 PROCEDURE — 80053 COMPREHEN METABOLIC PANEL: CPT | Performed by: INTERNAL MEDICINE

## 2017-03-09 PROCEDURE — 96375 TX/PRO/DX INJ NEW DRUG ADDON: CPT

## 2017-03-09 PROCEDURE — 74011250636 HC RX REV CODE- 250/636: Performed by: INTERNAL MEDICINE

## 2017-03-09 PROCEDURE — 85025 COMPLETE CBC W/AUTO DIFF WBC: CPT | Performed by: INTERNAL MEDICINE

## 2017-03-09 RX ORDER — PROCHLORPERAZINE EDISYLATE 5 MG/ML
10 INJECTION INTRAMUSCULAR; INTRAVENOUS
Status: CANCELLED
Start: 2017-03-16

## 2017-03-09 RX ORDER — HYDROCORTISONE SODIUM SUCCINATE 100 MG/2ML
100 INJECTION, POWDER, FOR SOLUTION INTRAMUSCULAR; INTRAVENOUS AS NEEDED
Status: CANCELLED | OUTPATIENT
Start: 2017-03-09

## 2017-03-09 RX ORDER — DIPHENHYDRAMINE HYDROCHLORIDE 50 MG/ML
25 INJECTION, SOLUTION INTRAMUSCULAR; INTRAVENOUS
Status: CANCELLED
Start: 2017-03-16

## 2017-03-09 RX ORDER — LORAZEPAM 2 MG/ML
0.5 INJECTION INTRAMUSCULAR
Status: CANCELLED
Start: 2017-03-16

## 2017-03-09 RX ORDER — HEPARIN SODIUM 1000 [USP'U]/ML
2000 INJECTION, SOLUTION INTRAVENOUS; SUBCUTANEOUS AS NEEDED
Status: CANCELLED
Start: 2017-03-09

## 2017-03-09 RX ORDER — SODIUM CHLORIDE 0.9 % (FLUSH) 0.9 %
10-40 SYRINGE (ML) INJECTION AS NEEDED
Status: ACTIVE | OUTPATIENT
Start: 2017-03-09 | End: 2017-03-10

## 2017-03-09 RX ORDER — ACETAMINOPHEN 325 MG/1
650 TABLET ORAL AS NEEDED
Status: CANCELLED
Start: 2017-03-16

## 2017-03-09 RX ORDER — ALBUTEROL SULFATE 0.83 MG/ML
2.5 SOLUTION RESPIRATORY (INHALATION) AS NEEDED
Status: CANCELLED
Start: 2017-03-09

## 2017-03-09 RX ORDER — ALBUTEROL SULFATE 0.83 MG/ML
2.5 SOLUTION RESPIRATORY (INHALATION) AS NEEDED
Status: CANCELLED
Start: 2017-03-16

## 2017-03-09 RX ORDER — EPINEPHRINE 1 MG/ML
0.3 INJECTION, SOLUTION, CONCENTRATE INTRAVENOUS AS NEEDED
Status: CANCELLED | OUTPATIENT
Start: 2017-03-09

## 2017-03-09 RX ORDER — ONDANSETRON 2 MG/ML
8 INJECTION INTRAMUSCULAR; INTRAVENOUS ONCE
Status: COMPLETED | OUTPATIENT
Start: 2017-03-09 | End: 2017-03-09

## 2017-03-09 RX ORDER — HEPARIN SODIUM 1000 [USP'U]/ML
2000 INJECTION, SOLUTION INTRAVENOUS; SUBCUTANEOUS AS NEEDED
Status: CANCELLED
Start: 2017-03-16

## 2017-03-09 RX ORDER — HEPARIN 100 UNIT/ML
300-500 SYRINGE INTRAVENOUS AS NEEDED
Status: CANCELLED
Start: 2017-03-09

## 2017-03-09 RX ORDER — DEXAMETHASONE SODIUM PHOSPHATE 100 MG/10ML
10 INJECTION INTRAMUSCULAR; INTRAVENOUS ONCE
Status: COMPLETED | OUTPATIENT
Start: 2017-03-09 | End: 2017-03-09

## 2017-03-09 RX ORDER — HYDROCORTISONE SODIUM SUCCINATE 100 MG/2ML
100 INJECTION, POWDER, FOR SOLUTION INTRAMUSCULAR; INTRAVENOUS AS NEEDED
Status: CANCELLED | OUTPATIENT
Start: 2017-03-16

## 2017-03-09 RX ORDER — HEPARIN 100 UNIT/ML
300-500 SYRINGE INTRAVENOUS AS NEEDED
Status: CANCELLED
Start: 2017-03-16

## 2017-03-09 RX ORDER — ACETAMINOPHEN 325 MG/1
650 TABLET ORAL AS NEEDED
Status: CANCELLED
Start: 2017-03-09

## 2017-03-09 RX ORDER — DIPHENHYDRAMINE HYDROCHLORIDE 50 MG/ML
50 INJECTION, SOLUTION INTRAMUSCULAR; INTRAVENOUS AS NEEDED
Status: CANCELLED
Start: 2017-03-16

## 2017-03-09 RX ORDER — DEXAMETHASONE SODIUM PHOSPHATE 100 MG/10ML
10 INJECTION INTRAMUSCULAR; INTRAVENOUS ONCE
Status: CANCELLED
Start: 2017-03-16 | End: 2017-03-16

## 2017-03-09 RX ORDER — METOCLOPRAMIDE HYDROCHLORIDE 5 MG/ML
10 INJECTION INTRAMUSCULAR; INTRAVENOUS ONCE
Status: CANCELLED
Start: 2017-03-16 | End: 2017-03-16

## 2017-03-09 RX ORDER — ONDANSETRON 2 MG/ML
8 INJECTION INTRAMUSCULAR; INTRAVENOUS AS NEEDED
Status: CANCELLED | OUTPATIENT
Start: 2017-03-09

## 2017-03-09 RX ORDER — ONDANSETRON 2 MG/ML
8 INJECTION INTRAMUSCULAR; INTRAVENOUS AS NEEDED
Status: CANCELLED | OUTPATIENT
Start: 2017-03-16

## 2017-03-09 RX ORDER — EPINEPHRINE 1 MG/ML
0.3 INJECTION, SOLUTION, CONCENTRATE INTRAVENOUS AS NEEDED
Status: CANCELLED | OUTPATIENT
Start: 2017-03-16

## 2017-03-09 RX ORDER — SODIUM CHLORIDE 0.9 % (FLUSH) 0.9 %
10 SYRINGE (ML) INJECTION AS NEEDED
Status: CANCELLED
Start: 2017-03-16

## 2017-03-09 RX ORDER — DIPHENHYDRAMINE HYDROCHLORIDE 50 MG/ML
50 INJECTION, SOLUTION INTRAMUSCULAR; INTRAVENOUS AS NEEDED
Status: CANCELLED
Start: 2017-03-09

## 2017-03-09 RX ORDER — SODIUM CHLORIDE 0.9 % (FLUSH) 0.9 %
10 SYRINGE (ML) INJECTION AS NEEDED
Status: ACTIVE | OUTPATIENT
Start: 2017-03-09 | End: 2017-03-10

## 2017-03-09 RX ADMIN — Medication 10 ML: at 15:05

## 2017-03-09 RX ADMIN — ONDANSETRON 8 MG: 2 INJECTION INTRAMUSCULAR; INTRAVENOUS at 15:59

## 2017-03-09 RX ADMIN — CARBOPLATIN 411.3 MG: 10 INJECTION, SOLUTION INTRAVENOUS at 17:28

## 2017-03-09 RX ADMIN — SODIUM CHLORIDE 1750 MG: 900 INJECTION, SOLUTION INTRAVENOUS at 16:44

## 2017-03-09 RX ADMIN — DEXAMETHASONE SODIUM PHOSPHATE 10 MG: 10 INJECTION INTRAMUSCULAR; INTRAVENOUS at 16:02

## 2017-03-09 RX ADMIN — SODIUM CHLORIDE 500 ML: 900 INJECTION, SOLUTION INTRAVENOUS at 15:42

## 2017-03-09 NOTE — PROGRESS NOTES
Questioned patient about Zofran allergy. States it was 1 time about 10 years ago when she was on CHOP. Patient states that she thinks it may have just been the CHOP chemo. She thought her nausea / vomiting was worse after Zofran. Patient states \" I want to try the Zofran. I already picked up my prescription downstairs. \"

## 2017-03-09 NOTE — PROGRESS NOTES
I gave patient the Oncology Care Model participation letter. I let her know that the average patient responsibility for 6 months of treatment for type cancer is $7,311 after Medicare pays.

## 2017-03-10 NOTE — PROGRESS NOTES
Arrived to the [facility]. Chemo completed. Patient tolerated well. PIV removed intact, site clear. Any issues or concerns during appointment: None. Patient aware of next infusion appointment on 3/16 (date) at 1400 (time). Discharged ambulatory in stable condition.

## 2017-03-13 ENCOUNTER — HOSPITAL ENCOUNTER (OUTPATIENT)
Dept: CT IMAGING | Age: 72
Discharge: HOME | End: 2017-03-13
Attending: INTERNAL MEDICINE
Payer: MEDICARE

## 2017-03-13 VITALS — HEIGHT: 64 IN | WEIGHT: 150 LBS | BODY MASS INDEX: 25.61 KG/M2

## 2017-03-13 DIAGNOSIS — C34.31 MALIGNANT NEOPLASM OF LOWER LOBE OF RIGHT LUNG (HCC): ICD-10-CM

## 2017-03-13 PROCEDURE — 74011000258 HC RX REV CODE- 258: Performed by: INTERNAL MEDICINE

## 2017-03-13 PROCEDURE — 70470 CT HEAD/BRAIN W/O & W/DYE: CPT

## 2017-03-13 PROCEDURE — 74011636320 HC RX REV CODE- 636/320: Performed by: INTERNAL MEDICINE

## 2017-03-13 RX ORDER — SODIUM CHLORIDE 0.9 % (FLUSH) 0.9 %
10 SYRINGE (ML) INJECTION
Status: COMPLETED | OUTPATIENT
Start: 2017-03-13 | End: 2017-03-13

## 2017-03-13 RX ADMIN — Medication 10 ML: at 13:24

## 2017-03-13 RX ADMIN — IOVERSOL 100 ML: 741 INJECTION INTRA-ARTERIAL; INTRAVENOUS at 13:24

## 2017-03-13 RX ADMIN — SODIUM CHLORIDE 100 ML: 900 INJECTION, SOLUTION INTRAVENOUS at 13:24

## 2017-03-16 ENCOUNTER — HOSPITAL ENCOUNTER (OUTPATIENT)
Dept: LAB | Age: 72
Discharge: HOME | End: 2017-03-16
Payer: MEDICARE

## 2017-03-16 ENCOUNTER — HOSPITAL ENCOUNTER (OUTPATIENT)
Dept: INFUSION THERAPY | Age: 72
Discharge: HOME | End: 2017-03-16
Payer: MEDICARE

## 2017-03-16 DIAGNOSIS — C34.90 NON-SMALL CELL LUNG CANCER, UNSPECIFIED LATERALITY (HCC): Chronic | ICD-10-CM

## 2017-03-16 DIAGNOSIS — C34.91 NON-SMALL CELL LUNG CANCER, RIGHT (HCC): ICD-10-CM

## 2017-03-16 LAB
ALBUMIN SERPL BCP-MCNC: 3.3 G/DL (ref 3.2–4.6)
ALBUMIN/GLOB SERPL: 0.9 {RATIO} (ref 1.2–3.5)
ALP SERPL-CCNC: 86 U/L (ref 50–136)
ALT SERPL-CCNC: 20 U/L (ref 12–65)
ANION GAP BLD CALC-SCNC: 7 MMOL/L (ref 7–16)
AST SERPL W P-5'-P-CCNC: 16 U/L (ref 15–37)
BASOPHILS # BLD AUTO: 0 K/UL (ref 0–0.2)
BASOPHILS # BLD: 1 % (ref 0–2)
BILIRUB SERPL-MCNC: 0.4 MG/DL (ref 0.2–1.1)
BUN SERPL-MCNC: 11 MG/DL (ref 8–23)
CALCIUM SERPL-MCNC: 8.6 MG/DL (ref 8.3–10.4)
CHLORIDE SERPL-SCNC: 101 MMOL/L (ref 98–107)
CO2 SERPL-SCNC: 30 MMOL/L (ref 23–32)
CREAT SERPL-MCNC: 0.74 MG/DL (ref 0.6–1)
DIFFERENTIAL METHOD BLD: ABNORMAL
EOSINOPHIL # BLD: 0 K/UL (ref 0–0.8)
EOSINOPHIL NFR BLD: 0 % (ref 0.5–7.8)
ERYTHROCYTE [DISTWIDTH] IN BLOOD BY AUTOMATED COUNT: 13.6 % (ref 11.9–14.6)
GLOBULIN SER CALC-MCNC: 3.5 G/DL (ref 2.3–3.5)
GLUCOSE SERPL-MCNC: 103 MG/DL (ref 65–100)
HCT VFR BLD AUTO: 37.8 % (ref 35.8–46.3)
HGB BLD-MCNC: 12.3 G/DL (ref 11.7–15.4)
LYMPHOCYTES # BLD AUTO: 33 % (ref 13–44)
LYMPHOCYTES # BLD: 1.3 K/UL (ref 0.5–4.6)
MAGNESIUM SERPL-MCNC: 2.3 MG/DL (ref 1.8–2.4)
MCH RBC QN AUTO: 33.1 PG (ref 26.1–32.9)
MCHC RBC AUTO-ENTMCNC: 32.5 G/DL (ref 31.4–35)
MCV RBC AUTO: 101.6 FL (ref 79.6–97.8)
MONOCYTES # BLD: 0.1 K/UL (ref 0.1–1.3)
MONOCYTES NFR BLD AUTO: 2 % (ref 4–12)
NEUTS SEG # BLD: 2.5 K/UL (ref 1.7–8.2)
NEUTS SEG NFR BLD AUTO: 65 % (ref 43–78)
NRBC # BLD: 0.01 K/UL (ref 0–0.2)
PLATELET # BLD AUTO: 162 K/UL (ref 150–450)
PMV BLD AUTO: 8.2 FL (ref 10.8–14.1)
POTASSIUM SERPL-SCNC: 3.6 MMOL/L (ref 3.5–5.1)
PROT SERPL-MCNC: 6.8 G/DL (ref 6.3–8.2)
RBC # BLD AUTO: 3.72 M/UL (ref 4.05–5.25)
SODIUM SERPL-SCNC: 138 MMOL/L (ref 136–145)
WBC # BLD AUTO: 3.9 K/UL (ref 4.3–11.1)

## 2017-03-16 PROCEDURE — 74011250636 HC RX REV CODE- 250/636: Performed by: INTERNAL MEDICINE

## 2017-03-16 PROCEDURE — 96361 HYDRATE IV INFUSION ADD-ON: CPT

## 2017-03-16 PROCEDURE — 96413 CHEMO IV INFUSION 1 HR: CPT

## 2017-03-16 PROCEDURE — 96375 TX/PRO/DX INJ NEW DRUG ADDON: CPT

## 2017-03-16 RX ORDER — DEXAMETHASONE SODIUM PHOSPHATE 100 MG/10ML
10 INJECTION INTRAMUSCULAR; INTRAVENOUS ONCE
Status: COMPLETED | OUTPATIENT
Start: 2017-03-16 | End: 2017-03-16

## 2017-03-16 RX ORDER — HEPARIN 100 UNIT/ML
300-500 SYRINGE INTRAVENOUS AS NEEDED
Status: ACTIVE | OUTPATIENT
Start: 2017-03-16 | End: 2017-03-17

## 2017-03-16 RX ORDER — SODIUM CHLORIDE 0.9 % (FLUSH) 0.9 %
10 SYRINGE (ML) INJECTION AS NEEDED
Status: ACTIVE | OUTPATIENT
Start: 2017-03-16 | End: 2017-03-17

## 2017-03-16 RX ORDER — METOCLOPRAMIDE HYDROCHLORIDE 5 MG/ML
10 INJECTION INTRAMUSCULAR; INTRAVENOUS ONCE
Status: COMPLETED | OUTPATIENT
Start: 2017-03-16 | End: 2017-03-16

## 2017-03-16 RX ADMIN — DEXAMETHASONE SODIUM PHOSPHATE 10 MG: 10 INJECTION INTRAMUSCULAR; INTRAVENOUS at 15:48

## 2017-03-16 RX ADMIN — Medication 10 ML: at 17:00

## 2017-03-16 RX ADMIN — SODIUM CHLORIDE 500 ML: 900 INJECTION, SOLUTION INTRAVENOUS at 15:50

## 2017-03-16 RX ADMIN — SODIUM CHLORIDE 1750 MG: 900 INJECTION, SOLUTION INTRAVENOUS at 16:25

## 2017-03-16 RX ADMIN — METOCLOPRAMIDE 10 MG: 5 INJECTION, SOLUTION INTRAMUSCULAR; INTRAVENOUS at 15:46

## 2017-03-16 RX ADMIN — Medication 10 ML: at 15:45

## 2017-03-16 NOTE — PROGRESS NOTES
Arrived to the [facility]. Ray completed. Patient tolerated well. Any issues or concerns during appointment: Pt with c/o burning at the IV site. IV flushed well with positive BR. Heat applied. Pt states burning improved with heat. IV flushed before during and after medications with positive BR. Patient aware of next infusion appointment on March 30th at 1315. Discharged ambulatory.

## 2017-03-23 ENCOUNTER — APPOINTMENT (OUTPATIENT)
Dept: INFUSION THERAPY | Age: 72
End: 2017-03-23
Payer: MEDICARE

## 2017-03-30 ENCOUNTER — HOSPITAL ENCOUNTER (OUTPATIENT)
Dept: INFUSION THERAPY | Age: 72
Discharge: HOME | End: 2017-03-30
Payer: MEDICARE

## 2017-03-30 ENCOUNTER — HOSPITAL ENCOUNTER (OUTPATIENT)
Dept: GENERAL RADIOLOGY | Age: 72
Discharge: HOME | End: 2017-03-30
Attending: INTERNAL MEDICINE
Payer: MEDICARE

## 2017-03-30 ENCOUNTER — HOSPITAL ENCOUNTER (OUTPATIENT)
Dept: LAB | Age: 72
Discharge: HOME | End: 2017-03-30
Payer: MEDICARE

## 2017-03-30 DIAGNOSIS — C34.91 NON-SMALL CELL LUNG CANCER, RIGHT (HCC): Chronic | ICD-10-CM

## 2017-03-30 DIAGNOSIS — J42 CHRONIC BRONCHITIS, UNSPECIFIED CHRONIC BRONCHITIS TYPE (HCC): Chronic | ICD-10-CM

## 2017-03-30 DIAGNOSIS — C34.90 NON-SMALL CELL LUNG CANCER, UNSPECIFIED LATERALITY (HCC): Chronic | ICD-10-CM

## 2017-03-30 LAB
ALBUMIN SERPL BCP-MCNC: 2.9 G/DL (ref 3.2–4.6)
ALBUMIN/GLOB SERPL: 0.8 {RATIO} (ref 1.2–3.5)
ALP SERPL-CCNC: 91 U/L (ref 50–136)
ALT SERPL-CCNC: 21 U/L (ref 12–65)
ANION GAP BLD CALC-SCNC: 8 MMOL/L (ref 7–16)
AST SERPL W P-5'-P-CCNC: 17 U/L (ref 15–37)
BASOPHILS # BLD AUTO: 0 K/UL (ref 0–0.2)
BASOPHILS # BLD: 1 % (ref 0–2)
BILIRUB SERPL-MCNC: 0.2 MG/DL (ref 0.2–1.1)
BUN SERPL-MCNC: 4 MG/DL (ref 8–23)
CALCIUM SERPL-MCNC: 8.9 MG/DL (ref 8.3–10.4)
CHLORIDE SERPL-SCNC: 102 MMOL/L (ref 98–107)
CO2 SERPL-SCNC: 31 MMOL/L (ref 23–32)
CREAT SERPL-MCNC: 0.74 MG/DL (ref 0.6–1)
DIFFERENTIAL METHOD BLD: ABNORMAL
EOSINOPHIL # BLD: 0 K/UL (ref 0–0.8)
EOSINOPHIL NFR BLD: 1 % (ref 0.5–7.8)
ERYTHROCYTE [DISTWIDTH] IN BLOOD BY AUTOMATED COUNT: 13.7 % (ref 11.9–14.6)
GLOBULIN SER CALC-MCNC: 3.6 G/DL (ref 2.3–3.5)
GLUCOSE SERPL-MCNC: 112 MG/DL (ref 65–100)
HCT VFR BLD AUTO: 29.1 % (ref 35.8–46.3)
HGB BLD-MCNC: 9.4 G/DL (ref 11.7–15.4)
LYMPHOCYTES # BLD AUTO: 38 % (ref 13–44)
LYMPHOCYTES # BLD: 0.8 K/UL (ref 0.5–4.6)
MAGNESIUM SERPL-MCNC: 2.2 MG/DL (ref 1.8–2.4)
MCH RBC QN AUTO: 32.8 PG (ref 26.1–32.9)
MCHC RBC AUTO-ENTMCNC: 32.3 G/DL (ref 31.4–35)
MCV RBC AUTO: 101.4 FL (ref 79.6–97.8)
MONOCYTES # BLD: 0.7 K/UL (ref 0.1–1.3)
MONOCYTES NFR BLD AUTO: 30 % (ref 4–12)
NEUTS SEG # BLD: 0.7 K/UL (ref 1.7–8.2)
NEUTS SEG NFR BLD AUTO: 30 % (ref 43–78)
NRBC # BLD: 0.02 K/UL (ref 0–0.2)
PLATELET # BLD AUTO: 254 K/UL (ref 150–450)
PMV BLD AUTO: 8.8 FL (ref 10.8–14.1)
POTASSIUM SERPL-SCNC: 3.7 MMOL/L (ref 3.5–5.1)
PROT SERPL-MCNC: 6.5 G/DL (ref 6.3–8.2)
RBC # BLD AUTO: 2.87 M/UL (ref 4.05–5.25)
SODIUM SERPL-SCNC: 141 MMOL/L (ref 136–145)
WBC # BLD AUTO: 2.2 K/UL (ref 4.3–11.1)

## 2017-03-30 PROCEDURE — 71020 XR CHEST PA LAT: CPT

## 2017-03-30 PROCEDURE — 71100 X-RAY EXAM RIBS UNI 2 VIEWS: CPT

## 2017-04-06 ENCOUNTER — HOSPITAL ENCOUNTER (OUTPATIENT)
Dept: INFUSION THERAPY | Age: 72
Discharge: HOME | End: 2017-04-06
Payer: MEDICARE

## 2017-04-06 ENCOUNTER — HOSPITAL ENCOUNTER (OUTPATIENT)
Dept: LAB | Age: 72
Discharge: HOME | End: 2017-04-06
Payer: MEDICARE

## 2017-04-06 DIAGNOSIS — C34.91 NON-SMALL CELL LUNG CANCER, RIGHT (HCC): Chronic | ICD-10-CM

## 2017-04-06 DIAGNOSIS — C34.91 NON-SMALL CELL LUNG CANCER, RIGHT (HCC): ICD-10-CM

## 2017-04-06 LAB
ALBUMIN SERPL BCP-MCNC: 3.2 G/DL (ref 3.2–4.6)
ALBUMIN/GLOB SERPL: 0.9 {RATIO} (ref 1.2–3.5)
ALP SERPL-CCNC: 87 U/L (ref 50–136)
ALT SERPL-CCNC: 21 U/L (ref 12–65)
ANION GAP BLD CALC-SCNC: 6 MMOL/L (ref 7–16)
AST SERPL W P-5'-P-CCNC: 26 U/L (ref 15–37)
BASOPHILS # BLD AUTO: 0 K/UL (ref 0–0.2)
BASOPHILS # BLD: 1 % (ref 0–2)
BILIRUB SERPL-MCNC: 0.3 MG/DL (ref 0.2–1.1)
BUN SERPL-MCNC: 7 MG/DL (ref 8–23)
CALCIUM SERPL-MCNC: 9.1 MG/DL (ref 8.3–10.4)
CHLORIDE SERPL-SCNC: 101 MMOL/L (ref 98–107)
CO2 SERPL-SCNC: 32 MMOL/L (ref 23–32)
CREAT SERPL-MCNC: 0.75 MG/DL (ref 0.6–1)
DIFFERENTIAL METHOD BLD: ABNORMAL
EOSINOPHIL # BLD: 0 K/UL (ref 0–0.8)
EOSINOPHIL NFR BLD: 0 % (ref 0.5–7.8)
ERYTHROCYTE [DISTWIDTH] IN BLOOD BY AUTOMATED COUNT: 16 % (ref 11.9–14.6)
GLOBULIN SER CALC-MCNC: 3.7 G/DL (ref 2.3–3.5)
GLUCOSE SERPL-MCNC: 88 MG/DL (ref 65–100)
HCT VFR BLD AUTO: 34.4 % (ref 35.8–46.3)
HGB BLD-MCNC: 10.7 G/DL (ref 11.7–15.4)
LYMPHOCYTES # BLD AUTO: 26 % (ref 13–44)
LYMPHOCYTES # BLD: 1.9 K/UL (ref 0.5–4.6)
MAGNESIUM SERPL-MCNC: 2.5 MG/DL (ref 1.8–2.4)
MCH RBC QN AUTO: 32.6 PG (ref 26.1–32.9)
MCHC RBC AUTO-ENTMCNC: 31.1 G/DL (ref 31.4–35)
MCV RBC AUTO: 104.9 FL (ref 79.6–97.8)
MONOCYTES # BLD: 1 K/UL (ref 0.1–1.3)
MONOCYTES NFR BLD AUTO: 14 % (ref 4–12)
NEUTS SEG # BLD: 4.3 K/UL (ref 1.7–8.2)
NEUTS SEG NFR BLD AUTO: 59 % (ref 43–78)
NRBC # BLD: 0.02 K/UL (ref 0–0.2)
PLATELET # BLD AUTO: 402 K/UL (ref 150–450)
PMV BLD AUTO: 8.2 FL (ref 10.8–14.1)
POTASSIUM SERPL-SCNC: 3.8 MMOL/L (ref 3.5–5.1)
PROT SERPL-MCNC: 6.9 G/DL (ref 6.3–8.2)
RBC # BLD AUTO: 3.28 M/UL (ref 4.05–5.25)
SODIUM SERPL-SCNC: 139 MMOL/L (ref 136–145)
WBC # BLD AUTO: 7.3 K/UL (ref 4.3–11.1)

## 2017-04-06 PROCEDURE — 74011250636 HC RX REV CODE- 250/636: Performed by: INTERNAL MEDICINE

## 2017-04-06 PROCEDURE — 74011250636 HC RX REV CODE- 250/636: Performed by: NURSE PRACTITIONER

## 2017-04-06 PROCEDURE — 96417 CHEMO IV INFUS EACH ADDL SEQ: CPT

## 2017-04-06 PROCEDURE — 74011000258 HC RX REV CODE- 258: Performed by: NURSE PRACTITIONER

## 2017-04-06 PROCEDURE — 96375 TX/PRO/DX INJ NEW DRUG ADDON: CPT

## 2017-04-06 PROCEDURE — 96413 CHEMO IV INFUSION 1 HR: CPT

## 2017-04-06 RX ORDER — DEXAMETHASONE SODIUM PHOSPHATE 100 MG/10ML
10 INJECTION INTRAMUSCULAR; INTRAVENOUS ONCE
Status: COMPLETED | OUTPATIENT
Start: 2017-04-06 | End: 2017-04-06

## 2017-04-06 RX ORDER — HEPARIN 100 UNIT/ML
300-500 SYRINGE INTRAVENOUS AS NEEDED
Status: DISPENSED | OUTPATIENT
Start: 2017-04-06 | End: 2017-04-07

## 2017-04-06 RX ORDER — ONDANSETRON 2 MG/ML
8 INJECTION INTRAMUSCULAR; INTRAVENOUS ONCE
Status: COMPLETED | OUTPATIENT
Start: 2017-04-06 | End: 2017-04-06

## 2017-04-06 RX ORDER — SODIUM CHLORIDE 9 MG/ML
1000 INJECTION, SOLUTION INTRAVENOUS ONCE
Status: COMPLETED | OUTPATIENT
Start: 2017-04-06 | End: 2017-04-06

## 2017-04-06 RX ORDER — SODIUM CHLORIDE 0.9 % (FLUSH) 0.9 %
10 SYRINGE (ML) INJECTION AS NEEDED
Status: ACTIVE | OUTPATIENT
Start: 2017-04-06 | End: 2017-04-07

## 2017-04-06 RX ADMIN — SODIUM CHLORIDE 1000 ML/HR: 900 INJECTION, SOLUTION INTRAVENOUS at 14:44

## 2017-04-06 RX ADMIN — ONDANSETRON 8 MG: 2 INJECTION INTRAMUSCULAR; INTRAVENOUS at 14:58

## 2017-04-06 RX ADMIN — SODIUM CHLORIDE 1750 MG: 900 INJECTION, SOLUTION INTRAVENOUS at 15:22

## 2017-04-06 RX ADMIN — DEXAMETHASONE SODIUM PHOSPHATE 10 MG: 10 INJECTION INTRAMUSCULAR; INTRAVENOUS at 14:56

## 2017-04-06 RX ADMIN — CARBOPLATIN 394.2 MG: 10 INJECTION, SOLUTION INTRAVENOUS at 16:35

## 2017-04-06 RX ADMIN — Medication 10 ML: at 17:27

## 2017-04-06 NOTE — PROGRESS NOTES
Arrived to the [facility]. [id number] Gemzar/Carbo completed. Patient tolerated well. 1L NS given for hypotension  Any issues or concerns during appointment: pt c/o IV site burning during Gemzar, PIV with positive BR, warm pack placed on site, infusion slowed, pt continued to c/o burning but was \"tolerable\" at a slower rate, needed o2 via NC at 3 L after up walking to bathroom, shortly before end of carbo infusion,  immediate relief. Patient aware of next infusion appointment on 4/13 (date) at  (time). Discharged ambulatory.

## 2017-04-13 ENCOUNTER — HOSPITAL ENCOUNTER (OUTPATIENT)
Dept: INFUSION THERAPY | Age: 72
Discharge: HOME | End: 2017-04-13
Payer: MEDICARE

## 2017-04-13 ENCOUNTER — HOSPITAL ENCOUNTER (OUTPATIENT)
Dept: LAB | Age: 72
Discharge: HOME | End: 2017-04-13
Payer: MEDICARE

## 2017-04-13 DIAGNOSIS — C34.91 NON-SMALL CELL LUNG CANCER, RIGHT (HCC): Chronic | ICD-10-CM

## 2017-04-13 DIAGNOSIS — C34.91 NON-SMALL CELL LUNG CANCER, RIGHT (HCC): ICD-10-CM

## 2017-04-13 LAB
ALBUMIN SERPL BCP-MCNC: 3.5 G/DL (ref 3.2–4.6)
ALBUMIN/GLOB SERPL: 1 {RATIO} (ref 1.2–3.5)
ALP SERPL-CCNC: 80 U/L (ref 50–136)
ALT SERPL-CCNC: 26 U/L (ref 12–65)
ANION GAP BLD CALC-SCNC: 6 MMOL/L (ref 7–16)
AST SERPL W P-5'-P-CCNC: 27 U/L (ref 15–37)
BASOPHILS # BLD AUTO: 0.1 K/UL (ref 0–0.2)
BASOPHILS # BLD: 1 % (ref 0–2)
BILIRUB SERPL-MCNC: 0.3 MG/DL (ref 0.2–1.1)
BUN SERPL-MCNC: 12 MG/DL (ref 8–23)
CALCIUM SERPL-MCNC: 9.1 MG/DL (ref 8.3–10.4)
CHLORIDE SERPL-SCNC: 103 MMOL/L (ref 98–107)
CO2 SERPL-SCNC: 32 MMOL/L (ref 23–32)
CREAT SERPL-MCNC: 0.73 MG/DL (ref 0.6–1)
DIFFERENTIAL METHOD BLD: ABNORMAL
EOSINOPHIL # BLD: 0 K/UL (ref 0–0.8)
EOSINOPHIL NFR BLD: 0 % (ref 0.5–7.8)
ERYTHROCYTE [DISTWIDTH] IN BLOOD BY AUTOMATED COUNT: 15.2 % (ref 11.9–14.6)
GLOBULIN SER CALC-MCNC: 3.4 G/DL (ref 2.3–3.5)
GLUCOSE SERPL-MCNC: 115 MG/DL (ref 65–100)
HCT VFR BLD AUTO: 31.2 % (ref 35.8–46.3)
HGB BLD-MCNC: 10.1 G/DL (ref 11.7–15.4)
LYMPHOCYTES # BLD AUTO: 16 % (ref 13–44)
LYMPHOCYTES # BLD: 0.6 K/UL (ref 0.5–4.6)
MAGNESIUM SERPL-MCNC: 2.1 MG/DL (ref 1.8–2.4)
MCH RBC QN AUTO: 33.4 PG (ref 26.1–32.9)
MCHC RBC AUTO-ENTMCNC: 32.4 G/DL (ref 31.4–35)
MCV RBC AUTO: 103.3 FL (ref 79.6–97.8)
MONOCYTES # BLD: 0.1 K/UL (ref 0.1–1.3)
MONOCYTES NFR BLD AUTO: 3 % (ref 4–12)
NEUTS SEG # BLD: 3.2 K/UL (ref 1.7–8.2)
NEUTS SEG NFR BLD AUTO: 80 % (ref 43–78)
NRBC # BLD: 0 K/UL (ref 0–0.2)
PLATELET # BLD AUTO: 160 K/UL (ref 150–450)
PMV BLD AUTO: 8.4 FL (ref 10.8–14.1)
POTASSIUM SERPL-SCNC: 4.6 MMOL/L (ref 3.5–5.1)
PROT SERPL-MCNC: 6.9 G/DL (ref 6.3–8.2)
RBC # BLD AUTO: 3.02 M/UL (ref 4.05–5.25)
SODIUM SERPL-SCNC: 141 MMOL/L (ref 136–145)
WBC # BLD AUTO: 4 K/UL (ref 4.3–11.1)

## 2017-04-13 PROCEDURE — 96413 CHEMO IV INFUSION 1 HR: CPT

## 2017-04-13 PROCEDURE — 96375 TX/PRO/DX INJ NEW DRUG ADDON: CPT

## 2017-04-13 PROCEDURE — 74011250636 HC RX REV CODE- 250/636: Performed by: NURSE PRACTITIONER

## 2017-04-13 RX ORDER — METOCLOPRAMIDE HYDROCHLORIDE 5 MG/ML
10 INJECTION INTRAMUSCULAR; INTRAVENOUS ONCE
Status: COMPLETED | OUTPATIENT
Start: 2017-04-13 | End: 2017-04-13

## 2017-04-13 RX ORDER — SODIUM CHLORIDE 0.9 % (FLUSH) 0.9 %
10 SYRINGE (ML) INJECTION AS NEEDED
Status: ACTIVE | OUTPATIENT
Start: 2017-04-13 | End: 2017-04-14

## 2017-04-13 RX ORDER — DEXAMETHASONE SODIUM PHOSPHATE 100 MG/10ML
10 INJECTION INTRAMUSCULAR; INTRAVENOUS ONCE
Status: COMPLETED | OUTPATIENT
Start: 2017-04-13 | End: 2017-04-13

## 2017-04-13 RX ORDER — HEPARIN 100 UNIT/ML
300-500 SYRINGE INTRAVENOUS AS NEEDED
Status: ACTIVE | OUTPATIENT
Start: 2017-04-13 | End: 2017-04-14

## 2017-04-13 RX ADMIN — Medication 10 ML: at 16:05

## 2017-04-13 RX ADMIN — Medication 10 ML: at 16:50

## 2017-04-13 RX ADMIN — SODIUM CHLORIDE 1750 MG: 900 INJECTION, SOLUTION INTRAVENOUS at 16:15

## 2017-04-13 RX ADMIN — SODIUM CHLORIDE 500 ML: 900 INJECTION, SOLUTION INTRAVENOUS at 16:08

## 2017-04-13 RX ADMIN — DEXAMETHASONE SODIUM PHOSPHATE 10 MG: 10 INJECTION INTRAMUSCULAR; INTRAVENOUS at 16:10

## 2017-04-13 RX ADMIN — METOCLOPRAMIDE 10 MG: 5 INJECTION, SOLUTION INTRAMUSCULAR; INTRAVENOUS at 16:12

## 2017-04-13 NOTE — PROGRESS NOTES
Arrived to the [facility]. Gemzar completed. Patient tolerated well. Any issues or concerns during appointment: NS ran concurrently with Gemzar as pt had burning at IV site. Site without redness or swelling and had excellent blood return. Heat applied to site for comfort. No further complaints. Patient aware of next infusion appointment on 4/27/17 at 1000. Discharged in [facility].

## 2017-04-27 ENCOUNTER — HOSPITAL ENCOUNTER (OUTPATIENT)
Dept: INFUSION THERAPY | Age: 72
Discharge: HOME | End: 2017-04-27
Payer: MEDICARE

## 2017-04-27 ENCOUNTER — HOSPITAL ENCOUNTER (OUTPATIENT)
Dept: LAB | Age: 72
Discharge: HOME | End: 2017-04-27
Payer: MEDICARE

## 2017-04-27 DIAGNOSIS — C34.91 NON-SMALL CELL LUNG CANCER, RIGHT (HCC): Chronic | ICD-10-CM

## 2017-04-27 LAB
ALBUMIN SERPL BCP-MCNC: 3 G/DL (ref 3.2–4.6)
ALBUMIN/GLOB SERPL: 0.9 {RATIO} (ref 1.2–3.5)
ALP SERPL-CCNC: 69 U/L (ref 50–136)
ALT SERPL-CCNC: 26 U/L (ref 12–65)
ANION GAP BLD CALC-SCNC: 10 MMOL/L (ref 7–16)
AST SERPL W P-5'-P-CCNC: 25 U/L (ref 15–37)
BASOPHILS # BLD AUTO: 0 K/UL (ref 0–0.2)
BASOPHILS # BLD: 0 % (ref 0–2)
BILIRUB SERPL-MCNC: 0.3 MG/DL (ref 0.2–1.1)
BUN SERPL-MCNC: 11 MG/DL (ref 8–23)
CALCIUM SERPL-MCNC: 9.1 MG/DL (ref 8.3–10.4)
CHLORIDE SERPL-SCNC: 106 MMOL/L (ref 98–107)
CO2 SERPL-SCNC: 28 MMOL/L (ref 21–32)
CREAT SERPL-MCNC: 0.85 MG/DL (ref 0.6–1)
DIFFERENTIAL METHOD BLD: ABNORMAL
EOSINOPHIL # BLD: 0 K/UL (ref 0–0.8)
EOSINOPHIL NFR BLD: 0 % (ref 0.5–7.8)
ERYTHROCYTE [DISTWIDTH] IN BLOOD BY AUTOMATED COUNT: 17.2 % (ref 11.9–14.6)
GLOBULIN SER CALC-MCNC: 3.3 G/DL (ref 2.3–3.5)
GLUCOSE SERPL-MCNC: 139 MG/DL (ref 65–100)
HCT VFR BLD AUTO: 26.4 % (ref 35.8–46.3)
HGB BLD-MCNC: 8.2 G/DL (ref 11.7–15.4)
LYMPHOCYTES # BLD AUTO: 29 % (ref 13–44)
LYMPHOCYTES # BLD: 1.5 K/UL (ref 0.5–4.6)
MAGNESIUM SERPL-MCNC: 2.2 MG/DL (ref 1.8–2.4)
MCH RBC QN AUTO: 33.1 PG (ref 26.1–32.9)
MCHC RBC AUTO-ENTMCNC: 31.1 G/DL (ref 31.4–35)
MCV RBC AUTO: 106.5 FL (ref 79.6–97.8)
MONOCYTES # BLD: 0.8 K/UL (ref 0.1–1.3)
MONOCYTES NFR BLD AUTO: 15 % (ref 4–12)
NEUTS SEG # BLD: 2.9 K/UL (ref 1.7–8.2)
NEUTS SEG NFR BLD AUTO: 56 % (ref 43–78)
NRBC # BLD: 0.07 K/UL (ref 0–0.2)
PLATELET # BLD AUTO: 139 K/UL (ref 150–450)
PMV BLD AUTO: 9.2 FL (ref 10.8–14.1)
POTASSIUM SERPL-SCNC: 3.7 MMOL/L (ref 3.5–5.1)
PROT SERPL-MCNC: 6.3 G/DL (ref 6.3–8.2)
RBC # BLD AUTO: 2.48 M/UL (ref 4.05–5.25)
SODIUM SERPL-SCNC: 144 MMOL/L (ref 136–145)
WBC # BLD AUTO: 5.3 K/UL (ref 4.3–11.1)

## 2017-04-27 PROCEDURE — 36415 COLL VENOUS BLD VENIPUNCTURE: CPT | Performed by: INTERNAL MEDICINE

## 2017-04-27 PROCEDURE — 83735 ASSAY OF MAGNESIUM: CPT | Performed by: INTERNAL MEDICINE

## 2017-04-27 PROCEDURE — 80053 COMPREHEN METABOLIC PANEL: CPT | Performed by: INTERNAL MEDICINE

## 2017-04-27 PROCEDURE — 85025 COMPLETE CBC W/AUTO DIFF WBC: CPT | Performed by: INTERNAL MEDICINE

## 2017-05-01 ENCOUNTER — HOSPITAL ENCOUNTER (OUTPATIENT)
Dept: CT IMAGING | Age: 72
Discharge: HOME | End: 2017-05-01
Attending: INTERNAL MEDICINE
Payer: MEDICARE

## 2017-05-01 DIAGNOSIS — C34.90 NON-SMALL CELL LUNG CANCER, UNSPECIFIED LATERALITY (HCC): Chronic | ICD-10-CM

## 2017-05-01 PROCEDURE — 71250 CT THORAX DX C-: CPT

## 2017-05-04 ENCOUNTER — HOSPITAL ENCOUNTER (OUTPATIENT)
Dept: INFUSION THERAPY | Age: 72
End: 2017-05-04
Payer: MEDICARE

## 2017-05-04 ENCOUNTER — HOSPITAL ENCOUNTER (OUTPATIENT)
Dept: INFUSION THERAPY | Age: 72
Discharge: HOME | End: 2017-05-04
Payer: MEDICARE

## 2017-05-04 ENCOUNTER — HOSPITAL ENCOUNTER (OUTPATIENT)
Dept: LAB | Age: 72
Discharge: HOME | End: 2017-05-04
Payer: MEDICARE

## 2017-05-04 VITALS — BODY MASS INDEX: 25.33 KG/M2 | WEIGHT: 143 LBS

## 2017-05-04 DIAGNOSIS — C34.91 NON-SMALL CELL LUNG CANCER, RIGHT (HCC): ICD-10-CM

## 2017-05-04 DIAGNOSIS — C34.90 NON-SMALL CELL LUNG CANCER, UNSPECIFIED LATERALITY (HCC): Chronic | ICD-10-CM

## 2017-05-04 LAB
ALBUMIN SERPL BCP-MCNC: 3.4 G/DL (ref 3.2–4.6)
ALBUMIN/GLOB SERPL: 1 {RATIO} (ref 1.2–3.5)
ALP SERPL-CCNC: 76 U/L (ref 50–136)
ALT SERPL-CCNC: 24 U/L (ref 12–65)
ANION GAP BLD CALC-SCNC: 7 MMOL/L (ref 7–16)
AST SERPL W P-5'-P-CCNC: 17 U/L (ref 15–37)
BASOPHILS # BLD AUTO: 0 K/UL (ref 0–0.2)
BASOPHILS # BLD: 0 % (ref 0–2)
BILIRUB SERPL-MCNC: 0.3 MG/DL (ref 0.2–1.1)
BUN SERPL-MCNC: 9 MG/DL (ref 8–23)
CALCIUM SERPL-MCNC: 9.1 MG/DL (ref 8.3–10.4)
CHLORIDE SERPL-SCNC: 100 MMOL/L (ref 98–107)
CO2 SERPL-SCNC: 34 MMOL/L (ref 21–32)
CREAT SERPL-MCNC: 0.72 MG/DL (ref 0.6–1)
DIFFERENTIAL METHOD BLD: ABNORMAL
EOSINOPHIL # BLD: 0 K/UL (ref 0–0.8)
EOSINOPHIL NFR BLD: 0 % (ref 0.5–7.8)
ERYTHROCYTE [DISTWIDTH] IN BLOOD BY AUTOMATED COUNT: 19.8 % (ref 11.9–14.6)
GLOBULIN SER CALC-MCNC: 3.3 G/DL (ref 2.3–3.5)
GLUCOSE SERPL-MCNC: 83 MG/DL (ref 65–100)
HCT VFR BLD AUTO: 32.1 % (ref 35.8–46.3)
HGB BLD-MCNC: 9.9 G/DL (ref 11.7–15.4)
LYMPHOCYTES # BLD AUTO: 19 % (ref 13–44)
LYMPHOCYTES # BLD: 1.6 K/UL (ref 0.5–4.6)
MCH RBC QN AUTO: 34.1 PG (ref 26.1–32.9)
MCHC RBC AUTO-ENTMCNC: 30.8 G/DL (ref 31.4–35)
MCV RBC AUTO: 110.7 FL (ref 79.6–97.8)
MONOCYTES # BLD: 1.3 K/UL (ref 0.1–1.3)
MONOCYTES NFR BLD AUTO: 15 % (ref 4–12)
NEUTS SEG # BLD: 5.7 K/UL (ref 1.7–8.2)
NEUTS SEG NFR BLD AUTO: 66 % (ref 43–78)
NRBC # BLD: 0.02 K/UL (ref 0–0.2)
PLATELET # BLD AUTO: 308 K/UL (ref 150–450)
PMV BLD AUTO: 8.4 FL (ref 10.8–14.1)
POTASSIUM SERPL-SCNC: 3.7 MMOL/L (ref 3.5–5.1)
PROT SERPL-MCNC: 6.7 G/DL (ref 6.3–8.2)
RBC # BLD AUTO: 2.9 M/UL (ref 4.05–5.25)
SODIUM SERPL-SCNC: 141 MMOL/L (ref 136–145)
WBC # BLD AUTO: 8.6 K/UL (ref 4.3–11.1)

## 2017-05-04 PROCEDURE — 96413 CHEMO IV INFUSION 1 HR: CPT

## 2017-05-04 PROCEDURE — 96417 CHEMO IV INFUS EACH ADDL SEQ: CPT

## 2017-05-04 PROCEDURE — 96375 TX/PRO/DX INJ NEW DRUG ADDON: CPT

## 2017-05-04 PROCEDURE — 74011000258 HC RX REV CODE- 258: Performed by: INTERNAL MEDICINE

## 2017-05-04 PROCEDURE — 74011250636 HC RX REV CODE- 250/636: Performed by: INTERNAL MEDICINE

## 2017-05-04 PROCEDURE — 96361 HYDRATE IV INFUSION ADD-ON: CPT

## 2017-05-04 RX ORDER — ONDANSETRON 2 MG/ML
8 INJECTION INTRAMUSCULAR; INTRAVENOUS ONCE
Status: COMPLETED | OUTPATIENT
Start: 2017-05-04 | End: 2017-05-04

## 2017-05-04 RX ORDER — DEXAMETHASONE SODIUM PHOSPHATE 100 MG/10ML
10 INJECTION INTRAMUSCULAR; INTRAVENOUS ONCE
Status: COMPLETED | OUTPATIENT
Start: 2017-05-04 | End: 2017-05-04

## 2017-05-04 RX ORDER — SODIUM CHLORIDE 0.9 % (FLUSH) 0.9 %
10 SYRINGE (ML) INJECTION AS NEEDED
Status: ACTIVE | OUTPATIENT
Start: 2017-05-04 | End: 2017-05-05

## 2017-05-04 RX ORDER — HEPARIN 100 UNIT/ML
300-500 SYRINGE INTRAVENOUS AS NEEDED
Status: ACTIVE | OUTPATIENT
Start: 2017-05-04 | End: 2017-05-05

## 2017-05-04 RX ADMIN — ONDANSETRON 8 MG: 2 INJECTION INTRAMUSCULAR; INTRAVENOUS at 14:01

## 2017-05-04 RX ADMIN — GEMCITABINE 1750 MG: 1 INJECTION, POWDER, LYOPHILIZED, FOR SOLUTION INTRAVENOUS at 14:40

## 2017-05-04 RX ADMIN — DEXAMETHASONE SODIUM PHOSPHATE 10 MG: 10 INJECTION INTRAMUSCULAR; INTRAVENOUS at 14:04

## 2017-05-04 RX ADMIN — SODIUM CHLORIDE 500 ML: 900 INJECTION, SOLUTION INTRAVENOUS at 14:09

## 2017-05-04 RX ADMIN — CARBOPLATIN 402.2 MG: 10 INJECTION, SOLUTION INTRAVENOUS at 15:23

## 2017-05-04 RX ADMIN — Medication 10 ML: at 14:00

## 2017-05-05 NOTE — PROGRESS NOTES
Arrived to the [facility]. Carboplatin and Gemzar chemo completed. Patient tolerated well. Any issues or concerns during appointment: NO.  Patient aware of next infusion appointment on 05/11/17 (date) at 0 (time). Discharged ambulatory.

## 2017-05-08 ENCOUNTER — APPOINTMENT (OUTPATIENT)
Dept: INFUSION THERAPY | Age: 72
End: 2017-05-08
Payer: MEDICARE

## 2017-05-11 ENCOUNTER — HOSPITAL ENCOUNTER (OUTPATIENT)
Dept: INFUSION THERAPY | Age: 72
Discharge: HOME | End: 2017-05-11
Payer: MEDICARE

## 2017-05-11 ENCOUNTER — HOSPITAL ENCOUNTER (OUTPATIENT)
Dept: LAB | Age: 72
Discharge: HOME | End: 2017-05-11
Payer: MEDICARE

## 2017-05-11 DIAGNOSIS — C34.91 NON-SMALL CELL LUNG CANCER, RIGHT (HCC): ICD-10-CM

## 2017-05-11 DIAGNOSIS — C34.90 NON-SMALL CELL LUNG CANCER, UNSPECIFIED LATERALITY (HCC): Chronic | ICD-10-CM

## 2017-05-11 LAB
ALBUMIN SERPL BCP-MCNC: 3.6 G/DL (ref 3.2–4.6)
ALBUMIN/GLOB SERPL: 1.2 {RATIO} (ref 1.2–3.5)
ALP SERPL-CCNC: 66 U/L (ref 50–136)
ALT SERPL-CCNC: 33 U/L (ref 12–65)
ANION GAP BLD CALC-SCNC: 5 MMOL/L (ref 7–16)
AST SERPL W P-5'-P-CCNC: 25 U/L (ref 15–37)
BASOPHILS # BLD AUTO: 0 K/UL (ref 0–0.2)
BASOPHILS # BLD: 0 % (ref 0–2)
BILIRUB SERPL-MCNC: 0.4 MG/DL (ref 0.2–1.1)
BUN SERPL-MCNC: 11 MG/DL (ref 8–23)
CALCIUM SERPL-MCNC: 9.2 MG/DL (ref 8.3–10.4)
CHLORIDE SERPL-SCNC: 100 MMOL/L (ref 98–107)
CO2 SERPL-SCNC: 34 MMOL/L (ref 21–32)
CREAT SERPL-MCNC: 0.67 MG/DL (ref 0.6–1)
DIFFERENTIAL METHOD BLD: ABNORMAL
EOSINOPHIL # BLD: 0 K/UL (ref 0–0.8)
EOSINOPHIL NFR BLD: 0 % (ref 0.5–7.8)
ERYTHROCYTE [DISTWIDTH] IN BLOOD BY AUTOMATED COUNT: 17.8 % (ref 11.9–14.6)
GLOBULIN SER CALC-MCNC: 3 G/DL (ref 2.3–3.5)
GLUCOSE SERPL-MCNC: 99 MG/DL (ref 65–100)
HCT VFR BLD AUTO: 30 % (ref 35.8–46.3)
HGB BLD-MCNC: 9.6 G/DL (ref 11.7–15.4)
LYMPHOCYTES # BLD AUTO: 27 % (ref 13–44)
LYMPHOCYTES # BLD: 1.2 K/UL (ref 0.5–4.6)
MAGNESIUM SERPL-MCNC: 2.1 MG/DL (ref 1.8–2.4)
MCH RBC QN AUTO: 34.2 PG (ref 26.1–32.9)
MCHC RBC AUTO-ENTMCNC: 32 G/DL (ref 31.4–35)
MCV RBC AUTO: 106.8 FL (ref 79.6–97.8)
MONOCYTES # BLD: 0.2 K/UL (ref 0.1–1.3)
MONOCYTES NFR BLD AUTO: 5 % (ref 4–12)
NEUTS SEG # BLD: 3.1 K/UL (ref 1.7–8.2)
NEUTS SEG NFR BLD AUTO: 68 % (ref 43–78)
NRBC # BLD: 0 K/UL (ref 0–0.2)
PLATELET # BLD AUTO: 114 K/UL (ref 150–450)
PMV BLD AUTO: 8.9 FL (ref 10.8–14.1)
POTASSIUM SERPL-SCNC: 3.1 MMOL/L (ref 3.5–5.1)
PROT SERPL-MCNC: 6.6 G/DL (ref 6.3–8.2)
RBC # BLD AUTO: 2.81 M/UL (ref 4.05–5.25)
SODIUM SERPL-SCNC: 139 MMOL/L (ref 136–145)
WBC # BLD AUTO: 4.6 K/UL (ref 4.3–11.1)

## 2017-05-11 PROCEDURE — 74011250636 HC RX REV CODE- 250/636: Performed by: INTERNAL MEDICINE

## 2017-05-11 PROCEDURE — 96413 CHEMO IV INFUSION 1 HR: CPT

## 2017-05-11 PROCEDURE — 96361 HYDRATE IV INFUSION ADD-ON: CPT

## 2017-05-11 PROCEDURE — 96375 TX/PRO/DX INJ NEW DRUG ADDON: CPT

## 2017-05-11 RX ORDER — DEXAMETHASONE SODIUM PHOSPHATE 100 MG/10ML
10 INJECTION INTRAMUSCULAR; INTRAVENOUS ONCE
Status: COMPLETED | OUTPATIENT
Start: 2017-05-11 | End: 2017-05-11

## 2017-05-11 RX ORDER — SODIUM CHLORIDE 0.9 % (FLUSH) 0.9 %
10 SYRINGE (ML) INJECTION AS NEEDED
Status: ACTIVE | OUTPATIENT
Start: 2017-05-11 | End: 2017-05-12

## 2017-05-11 RX ORDER — PROCHLORPERAZINE EDISYLATE 5 MG/ML
10 INJECTION INTRAMUSCULAR; INTRAVENOUS
Status: ACTIVE | OUTPATIENT
Start: 2017-05-11 | End: 2017-05-12

## 2017-05-11 RX ORDER — HEPARIN 100 UNIT/ML
300-500 SYRINGE INTRAVENOUS AS NEEDED
Status: ACTIVE | OUTPATIENT
Start: 2017-05-11 | End: 2017-05-12

## 2017-05-11 RX ORDER — METOCLOPRAMIDE HYDROCHLORIDE 5 MG/ML
10 INJECTION INTRAMUSCULAR; INTRAVENOUS ONCE
Status: COMPLETED | OUTPATIENT
Start: 2017-05-11 | End: 2017-05-11

## 2017-05-11 RX ORDER — DIPHENHYDRAMINE HYDROCHLORIDE 50 MG/ML
25 INJECTION, SOLUTION INTRAMUSCULAR; INTRAVENOUS
Status: ACTIVE | OUTPATIENT
Start: 2017-05-11 | End: 2017-05-12

## 2017-05-11 RX ORDER — LORAZEPAM 2 MG/ML
0.5 INJECTION INTRAMUSCULAR
Status: ACTIVE | OUTPATIENT
Start: 2017-05-11 | End: 2017-05-12

## 2017-05-11 RX ADMIN — METOCLOPRAMIDE 10 MG: 5 INJECTION, SOLUTION INTRAMUSCULAR; INTRAVENOUS at 16:42

## 2017-05-11 RX ADMIN — SODIUM CHLORIDE 500 ML: 900 INJECTION, SOLUTION INTRAVENOUS at 16:44

## 2017-05-11 RX ADMIN — SODIUM CHLORIDE 1750 MG: 900 INJECTION, SOLUTION INTRAVENOUS at 17:16

## 2017-05-11 RX ADMIN — Medication 10 ML: at 17:50

## 2017-05-11 RX ADMIN — Medication 10 ML: at 16:43

## 2017-05-11 RX ADMIN — Medication 10 ML: at 16:44

## 2017-05-11 RX ADMIN — DEXAMETHASONE SODIUM PHOSPHATE 10 MG: 10 INJECTION INTRAMUSCULAR; INTRAVENOUS at 16:43

## 2017-05-25 ENCOUNTER — HOSPITAL ENCOUNTER (OUTPATIENT)
Dept: LAB | Age: 72
Discharge: HOME | End: 2017-05-25
Payer: MEDICARE

## 2017-05-25 ENCOUNTER — HOSPITAL ENCOUNTER (OUTPATIENT)
Dept: INFUSION THERAPY | Age: 72
Discharge: HOME | End: 2017-05-25
Payer: MEDICARE

## 2017-05-25 DIAGNOSIS — C34.90 NON-SMALL CELL LUNG CANCER, UNSPECIFIED LATERALITY (HCC): Chronic | ICD-10-CM

## 2017-05-25 LAB
ALBUMIN SERPL BCP-MCNC: 3.4 G/DL (ref 3.2–4.6)
ALBUMIN/GLOB SERPL: 1.1 {RATIO} (ref 1.2–3.5)
ALP SERPL-CCNC: 73 U/L (ref 50–136)
ALT SERPL-CCNC: 34 U/L (ref 12–65)
ANION GAP BLD CALC-SCNC: 8 MMOL/L (ref 7–16)
AST SERPL W P-5'-P-CCNC: 28 U/L (ref 15–37)
BASOPHILS # BLD AUTO: 0 K/UL (ref 0–0.2)
BASOPHILS # BLD: 0 % (ref 0–2)
BILIRUB SERPL-MCNC: 0.4 MG/DL (ref 0.2–1.1)
BUN SERPL-MCNC: 7 MG/DL (ref 8–23)
CALCIUM SERPL-MCNC: 8.8 MG/DL (ref 8.3–10.4)
CHLORIDE SERPL-SCNC: 103 MMOL/L (ref 98–107)
CO2 SERPL-SCNC: 30 MMOL/L (ref 21–32)
CREAT SERPL-MCNC: 0.79 MG/DL (ref 0.6–1)
DIFFERENTIAL METHOD BLD: ABNORMAL
EOSINOPHIL # BLD: 0 K/UL (ref 0–0.8)
EOSINOPHIL NFR BLD: 1 % (ref 0.5–7.8)
ERYTHROCYTE [DISTWIDTH] IN BLOOD BY AUTOMATED COUNT: 18.4 % (ref 11.9–14.6)
GLOBULIN SER CALC-MCNC: 3 G/DL (ref 2.3–3.5)
GLUCOSE SERPL-MCNC: 170 MG/DL (ref 65–100)
HCT VFR BLD AUTO: 23.9 % (ref 35.8–46.3)
HGB BLD-MCNC: 7.5 G/DL (ref 11.7–15.4)
LYMPHOCYTES # BLD AUTO: 30 % (ref 13–44)
LYMPHOCYTES # BLD: 0.7 K/UL (ref 0.5–4.6)
MCH RBC QN AUTO: 35 PG (ref 26.1–32.9)
MCHC RBC AUTO-ENTMCNC: 31.4 G/DL (ref 31.4–35)
MCV RBC AUTO: 111.7 FL (ref 79.6–97.8)
MONOCYTES # BLD: 0.5 K/UL (ref 0.1–1.3)
MONOCYTES NFR BLD AUTO: 21 % (ref 4–12)
NEUTS SEG # BLD: 1.1 K/UL (ref 1.7–8.2)
NEUTS SEG NFR BLD AUTO: 48 % (ref 43–78)
PLATELET # BLD AUTO: 70 K/UL (ref 150–450)
PMV BLD AUTO: 10.3 FL (ref 10.8–14.1)
POTASSIUM SERPL-SCNC: 3.6 MMOL/L (ref 3.5–5.1)
PROT SERPL-MCNC: 6.4 G/DL (ref 6.3–8.2)
RBC # BLD AUTO: 2.14 M/UL (ref 4.05–5.25)
SODIUM SERPL-SCNC: 141 MMOL/L (ref 136–145)
WBC # BLD AUTO: 2.3 K/UL (ref 4.3–11.1)

## 2017-05-25 PROCEDURE — 80053 COMPREHEN METABOLIC PANEL: CPT | Performed by: INTERNAL MEDICINE

## 2017-05-25 PROCEDURE — 85025 COMPLETE CBC W/AUTO DIFF WBC: CPT | Performed by: INTERNAL MEDICINE

## 2017-05-25 PROCEDURE — 36415 COLL VENOUS BLD VENIPUNCTURE: CPT | Performed by: INTERNAL MEDICINE

## 2017-05-30 ENCOUNTER — HOSPITAL ENCOUNTER (OUTPATIENT)
Dept: LAB | Age: 72
Discharge: HOME | End: 2017-05-30
Payer: MEDICARE

## 2017-05-30 DIAGNOSIS — C34.90 NON-SMALL CELL LUNG CANCER, UNSPECIFIED LATERALITY (HCC): Chronic | ICD-10-CM

## 2017-05-30 LAB
ALBUMIN SERPL BCP-MCNC: 3.5 G/DL (ref 3.2–4.6)
ALBUMIN/GLOB SERPL: 1.1 {RATIO} (ref 1.2–3.5)
ALP SERPL-CCNC: 83 U/L (ref 50–136)
ALT SERPL-CCNC: 29 U/L (ref 12–65)
ANION GAP BLD CALC-SCNC: 6 MMOL/L (ref 7–16)
AST SERPL W P-5'-P-CCNC: 29 U/L (ref 15–37)
BASOPHILS # BLD AUTO: 0 K/UL (ref 0–0.2)
BASOPHILS # BLD: 0 % (ref 0–2)
BILIRUB SERPL-MCNC: 0.4 MG/DL (ref 0.2–1.1)
BUN SERPL-MCNC: 6 MG/DL (ref 8–23)
CALCIUM SERPL-MCNC: 8.9 MG/DL (ref 8.3–10.4)
CHLORIDE SERPL-SCNC: 107 MMOL/L (ref 98–107)
CO2 SERPL-SCNC: 31 MMOL/L (ref 21–32)
CREAT SERPL-MCNC: 0.68 MG/DL (ref 0.6–1)
DIFFERENTIAL METHOD BLD: ABNORMAL
EOSINOPHIL # BLD: 0 K/UL (ref 0–0.8)
EOSINOPHIL NFR BLD: 1 % (ref 0.5–7.8)
ERYTHROCYTE [DISTWIDTH] IN BLOOD BY AUTOMATED COUNT: 21.2 % (ref 11.9–14.6)
GLOBULIN SER CALC-MCNC: 3.1 G/DL (ref 2.3–3.5)
GLUCOSE SERPL-MCNC: 124 MG/DL (ref 65–100)
HCT VFR BLD AUTO: 25.3 % (ref 35.8–46.3)
HGB BLD-MCNC: 7.9 G/DL (ref 11.7–15.4)
LYMPHOCYTES # BLD AUTO: 21 % (ref 13–44)
LYMPHOCYTES # BLD: 0.7 K/UL (ref 0.5–4.6)
MAGNESIUM SERPL-MCNC: 2.4 MG/DL (ref 1.8–2.4)
MCH RBC QN AUTO: 35.3 PG (ref 26.1–32.9)
MCHC RBC AUTO-ENTMCNC: 31.2 G/DL (ref 31.4–35)
MCV RBC AUTO: 112.9 FL (ref 79.6–97.8)
MONOCYTES # BLD: 0.6 K/UL (ref 0.1–1.3)
MONOCYTES NFR BLD AUTO: 20 % (ref 4–12)
NEUTS SEG # BLD: 1.8 K/UL (ref 1.7–8.2)
NEUTS SEG NFR BLD AUTO: 57 % (ref 43–78)
NRBC # BLD: 0 K/UL (ref 0–0.2)
PLATELET # BLD AUTO: 133 K/UL (ref 150–450)
PMV BLD AUTO: 10.1 FL (ref 10.8–14.1)
POTASSIUM SERPL-SCNC: 4 MMOL/L (ref 3.5–5.1)
PROT SERPL-MCNC: 6.6 G/DL (ref 6.3–8.2)
RBC # BLD AUTO: 2.24 M/UL (ref 4.05–5.25)
SODIUM SERPL-SCNC: 144 MMOL/L (ref 136–145)
WBC # BLD AUTO: 3.1 K/UL (ref 4.3–11.1)

## 2017-05-30 PROCEDURE — 83735 ASSAY OF MAGNESIUM: CPT | Performed by: INTERNAL MEDICINE

## 2017-05-30 PROCEDURE — 86923 COMPATIBILITY TEST ELECTRIC: CPT | Performed by: INTERNAL MEDICINE

## 2017-05-30 PROCEDURE — 80053 COMPREHEN METABOLIC PANEL: CPT | Performed by: INTERNAL MEDICINE

## 2017-05-30 PROCEDURE — 36415 COLL VENOUS BLD VENIPUNCTURE: CPT | Performed by: INTERNAL MEDICINE

## 2017-05-30 PROCEDURE — 85025 COMPLETE CBC W/AUTO DIFF WBC: CPT | Performed by: INTERNAL MEDICINE

## 2017-05-30 PROCEDURE — 86900 BLOOD TYPING SEROLOGIC ABO: CPT | Performed by: INTERNAL MEDICINE

## 2017-05-31 ENCOUNTER — HOSPITAL ENCOUNTER (OUTPATIENT)
Dept: GENERAL RADIOLOGY | Age: 72
Discharge: HOME | End: 2017-05-31
Payer: MEDICARE

## 2017-05-31 DIAGNOSIS — C34.90 LUNG CANCER (HCC): ICD-10-CM

## 2017-05-31 PROCEDURE — 71020 XR CHEST PA LAT: CPT

## 2017-06-01 ENCOUNTER — HOSPITAL ENCOUNTER (OUTPATIENT)
Dept: INFUSION THERAPY | Age: 72
Discharge: HOME | End: 2017-06-01
Payer: MEDICARE

## 2017-06-01 ENCOUNTER — HOSPITAL ENCOUNTER (OUTPATIENT)
Dept: LAB | Age: 72
Discharge: HOME | End: 2017-06-01
Payer: MEDICARE

## 2017-06-01 VITALS
HEART RATE: 93 BPM | TEMPERATURE: 97.8 F | SYSTOLIC BLOOD PRESSURE: 101 MMHG | RESPIRATION RATE: 16 BRPM | DIASTOLIC BLOOD PRESSURE: 56 MMHG | OXYGEN SATURATION: 93 %

## 2017-06-01 DIAGNOSIS — C34.90 NON-SMALL CELL LUNG CANCER, UNSPECIFIED LATERALITY (HCC): Chronic | ICD-10-CM

## 2017-06-01 LAB
ALBUMIN SERPL BCP-MCNC: 3.5 G/DL (ref 3.2–4.6)
ALBUMIN/GLOB SERPL: 1.1 {RATIO} (ref 1.2–3.5)
ALP SERPL-CCNC: 86 U/L (ref 50–136)
ALT SERPL-CCNC: 26 U/L (ref 12–65)
ANION GAP BLD CALC-SCNC: 5 MMOL/L (ref 7–16)
AST SERPL W P-5'-P-CCNC: 29 U/L (ref 15–37)
BASOPHILS # BLD AUTO: 0 K/UL (ref 0–0.2)
BASOPHILS # BLD: 1 % (ref 0–2)
BILIRUB SERPL-MCNC: 0.4 MG/DL (ref 0.2–1.1)
BUN SERPL-MCNC: 6 MG/DL (ref 8–23)
CALCIUM SERPL-MCNC: 8.6 MG/DL (ref 8.3–10.4)
CHLORIDE SERPL-SCNC: 105 MMOL/L (ref 98–107)
CO2 SERPL-SCNC: 32 MMOL/L (ref 21–32)
CREAT SERPL-MCNC: 0.63 MG/DL (ref 0.6–1)
DIFFERENTIAL METHOD BLD: ABNORMAL
EOSINOPHIL # BLD: 0.1 K/UL (ref 0–0.8)
EOSINOPHIL NFR BLD: 1 % (ref 0.5–7.8)
ERYTHROCYTE [DISTWIDTH] IN BLOOD BY AUTOMATED COUNT: 20.9 % (ref 11.9–14.6)
GLOBULIN SER CALC-MCNC: 3.2 G/DL (ref 2.3–3.5)
GLUCOSE SERPL-MCNC: 99 MG/DL (ref 65–100)
HCT VFR BLD AUTO: 25.9 % (ref 35.8–46.3)
HGB BLD-MCNC: 8.1 G/DL (ref 11.7–15.4)
LYMPHOCYTES # BLD AUTO: 30 % (ref 13–44)
LYMPHOCYTES # BLD: 1.1 K/UL (ref 0.5–4.6)
MCH RBC QN AUTO: 35.2 PG (ref 26.1–32.9)
MCHC RBC AUTO-ENTMCNC: 31.3 G/DL (ref 31.4–35)
MCV RBC AUTO: 112.6 FL (ref 79.6–97.8)
MONOCYTES # BLD: 0.8 K/UL (ref 0.1–1.3)
MONOCYTES NFR BLD AUTO: 21 % (ref 4–12)
NEUTS SEG # BLD: 1.8 K/UL (ref 1.7–8.2)
NEUTS SEG NFR BLD AUTO: 47 % (ref 43–78)
NRBC # BLD: 0.01 K/UL (ref 0–0.2)
PLATELET # BLD AUTO: 155 K/UL (ref 150–450)
PMV BLD AUTO: 9.5 FL (ref 10.8–14.1)
POTASSIUM SERPL-SCNC: 3.7 MMOL/L (ref 3.5–5.1)
PROT SERPL-MCNC: 6.7 G/DL (ref 6.3–8.2)
RBC # BLD AUTO: 2.3 M/UL (ref 4.05–5.25)
SODIUM SERPL-SCNC: 142 MMOL/L (ref 136–145)
WBC # BLD AUTO: 3.7 K/UL (ref 4.3–11.1)

## 2017-06-01 PROCEDURE — 77030018667 ADMN ST IV BLD FENW -A

## 2017-06-01 PROCEDURE — P9016 RBC LEUKOCYTES REDUCED: HCPCS | Performed by: INTERNAL MEDICINE

## 2017-06-01 PROCEDURE — 36430 TRANSFUSION BLD/BLD COMPNT: CPT

## 2017-06-01 PROCEDURE — 74011250636 HC RX REV CODE- 250/636: Performed by: INTERNAL MEDICINE

## 2017-06-01 RX ORDER — EPINEPHRINE 1 MG/ML
0.3 INJECTION, SOLUTION, CONCENTRATE INTRAVENOUS AS NEEDED
Status: CANCELLED | OUTPATIENT
Start: 2017-06-08

## 2017-06-01 RX ORDER — HEPARIN 100 UNIT/ML
300-500 SYRINGE INTRAVENOUS AS NEEDED
Status: CANCELLED | OUTPATIENT
Start: 2017-06-08

## 2017-06-01 RX ORDER — LORAZEPAM 2 MG/ML
0.5 INJECTION INTRAMUSCULAR
Status: CANCELLED | OUTPATIENT
Start: 2017-06-08

## 2017-06-01 RX ORDER — SODIUM CHLORIDE 0.9 % (FLUSH) 0.9 %
10 SYRINGE (ML) INJECTION AS NEEDED
Status: CANCELLED | OUTPATIENT
Start: 2017-06-08

## 2017-06-01 RX ORDER — DIPHENHYDRAMINE HYDROCHLORIDE 50 MG/ML
50 INJECTION, SOLUTION INTRAMUSCULAR; INTRAVENOUS AS NEEDED
Status: CANCELLED
Start: 2017-06-08

## 2017-06-01 RX ORDER — HYDROCORTISONE SODIUM SUCCINATE 100 MG/2ML
100 INJECTION, POWDER, FOR SOLUTION INTRAMUSCULAR; INTRAVENOUS AS NEEDED
Status: CANCELLED | OUTPATIENT
Start: 2017-06-02

## 2017-06-01 RX ORDER — METOCLOPRAMIDE HYDROCHLORIDE 5 MG/ML
10 INJECTION INTRAMUSCULAR; INTRAVENOUS ONCE
Status: CANCELLED | OUTPATIENT
Start: 2017-06-08 | End: 2017-06-09

## 2017-06-01 RX ORDER — EPINEPHRINE 1 MG/ML
0.3 INJECTION, SOLUTION, CONCENTRATE INTRAVENOUS AS NEEDED
Status: CANCELLED | OUTPATIENT
Start: 2017-06-02

## 2017-06-01 RX ORDER — SODIUM CHLORIDE 0.9 % (FLUSH) 0.9 %
10 SYRINGE (ML) INJECTION AS NEEDED
Status: CANCELLED | OUTPATIENT
Start: 2017-06-02

## 2017-06-01 RX ORDER — ONDANSETRON 2 MG/ML
8 INJECTION INTRAMUSCULAR; INTRAVENOUS AS NEEDED
Status: CANCELLED | OUTPATIENT
Start: 2017-06-02

## 2017-06-01 RX ORDER — DEXAMETHASONE SODIUM PHOSPHATE 100 MG/10ML
10 INJECTION INTRAMUSCULAR; INTRAVENOUS ONCE
Status: CANCELLED | OUTPATIENT
Start: 2017-06-02 | End: 2017-06-02

## 2017-06-01 RX ORDER — HEPARIN SODIUM 1000 [USP'U]/ML
2000 INJECTION, SOLUTION INTRAVENOUS; SUBCUTANEOUS AS NEEDED
Status: CANCELLED
Start: 2017-06-08

## 2017-06-01 RX ORDER — DEXAMETHASONE SODIUM PHOSPHATE 100 MG/10ML
10 INJECTION INTRAMUSCULAR; INTRAVENOUS ONCE
Status: CANCELLED | OUTPATIENT
Start: 2017-06-08 | End: 2017-06-09

## 2017-06-01 RX ORDER — ALBUTEROL SULFATE 0.83 MG/ML
2.5 SOLUTION RESPIRATORY (INHALATION) AS NEEDED
Status: CANCELLED
Start: 2017-06-02

## 2017-06-01 RX ORDER — HEPARIN 100 UNIT/ML
300-500 SYRINGE INTRAVENOUS AS NEEDED
Status: CANCELLED | OUTPATIENT
Start: 2017-06-02

## 2017-06-01 RX ORDER — ONDANSETRON 2 MG/ML
8 INJECTION INTRAMUSCULAR; INTRAVENOUS ONCE
Status: CANCELLED | OUTPATIENT
Start: 2017-06-02 | End: 2017-06-02

## 2017-06-01 RX ORDER — DIPHENHYDRAMINE HYDROCHLORIDE 50 MG/ML
50 INJECTION, SOLUTION INTRAMUSCULAR; INTRAVENOUS AS NEEDED
Status: CANCELLED
Start: 2017-06-02

## 2017-06-01 RX ORDER — HYDROCORTISONE SODIUM SUCCINATE 100 MG/2ML
100 INJECTION, POWDER, FOR SOLUTION INTRAMUSCULAR; INTRAVENOUS AS NEEDED
Status: CANCELLED | OUTPATIENT
Start: 2017-06-08

## 2017-06-01 RX ORDER — DIPHENHYDRAMINE HYDROCHLORIDE 50 MG/ML
25 INJECTION, SOLUTION INTRAMUSCULAR; INTRAVENOUS
Status: CANCELLED | OUTPATIENT
Start: 2017-06-08

## 2017-06-01 RX ORDER — ONDANSETRON 2 MG/ML
8 INJECTION INTRAMUSCULAR; INTRAVENOUS AS NEEDED
Status: CANCELLED | OUTPATIENT
Start: 2017-06-08

## 2017-06-01 RX ORDER — ACETAMINOPHEN 325 MG/1
650 TABLET ORAL AS NEEDED
Status: CANCELLED
Start: 2017-06-08

## 2017-06-01 RX ORDER — SODIUM CHLORIDE 0.9 % (FLUSH) 0.9 %
5-10 SYRINGE (ML) INJECTION AS NEEDED
Status: DISCONTINUED | OUTPATIENT
Start: 2017-06-01 | End: 2017-06-05 | Stop reason: HOSPADM

## 2017-06-01 RX ORDER — SODIUM CHLORIDE 9 MG/ML
250 INJECTION, SOLUTION INTRAVENOUS AS NEEDED
Status: DISCONTINUED | OUTPATIENT
Start: 2017-06-01 | End: 2017-06-05 | Stop reason: HOSPADM

## 2017-06-01 RX ORDER — ACETAMINOPHEN 325 MG/1
650 TABLET ORAL AS NEEDED
Status: CANCELLED
Start: 2017-06-02

## 2017-06-01 RX ORDER — HEPARIN SODIUM 1000 [USP'U]/ML
2000 INJECTION, SOLUTION INTRAVENOUS; SUBCUTANEOUS AS NEEDED
Status: CANCELLED
Start: 2017-06-02

## 2017-06-01 RX ORDER — ALBUTEROL SULFATE 0.83 MG/ML
2.5 SOLUTION RESPIRATORY (INHALATION) AS NEEDED
Status: CANCELLED
Start: 2017-06-08

## 2017-06-01 RX ORDER — HEPARIN 100 UNIT/ML
500 SYRINGE INTRAVENOUS AS NEEDED
Status: ACTIVE | OUTPATIENT
Start: 2017-06-01 | End: 2017-06-02

## 2017-06-01 RX ORDER — PROCHLORPERAZINE EDISYLATE 5 MG/ML
10 INJECTION INTRAMUSCULAR; INTRAVENOUS
Status: CANCELLED | OUTPATIENT
Start: 2017-06-08

## 2017-06-01 RX ADMIN — SODIUM CHLORIDE 250 ML: 900 INJECTION, SOLUTION INTRAVENOUS at 16:00

## 2017-06-01 RX ADMIN — Medication 10 ML: at 16:00

## 2017-06-01 RX ADMIN — Medication 10 ML: at 17:48

## 2017-06-01 NOTE — PROGRESS NOTES
Pt arrived ambulatory to C. IV established with good blood return.  ml KVO. 1 unit of PRBC infusing. Pt tolerated well. IV flushed and discontinued with tip intact. Pt aware of next appt tomorrow 6/2/17 at 1115. Pt discharged ambulatory.

## 2017-06-02 ENCOUNTER — HOSPITAL ENCOUNTER (OUTPATIENT)
Dept: INFUSION THERAPY | Age: 72
Discharge: HOME | End: 2017-06-02
Payer: MEDICARE

## 2017-06-02 VITALS
HEART RATE: 85 BPM | BODY MASS INDEX: 25.23 KG/M2 | WEIGHT: 147 LBS | OXYGEN SATURATION: 93 % | TEMPERATURE: 97.6 F | DIASTOLIC BLOOD PRESSURE: 74 MMHG | SYSTOLIC BLOOD PRESSURE: 114 MMHG | RESPIRATION RATE: 16 BRPM

## 2017-06-02 DIAGNOSIS — C34.91 NON-SMALL CELL LUNG CANCER, RIGHT (HCC): ICD-10-CM

## 2017-06-02 LAB
ABO + RH BLD: NORMAL
BLD PROD TYP BPU: NORMAL
BLOOD GROUP ANTIBODIES SERPL: NORMAL
BPU ID: NORMAL
CROSSMATCH RESULT,%XM: NORMAL
SPECIMEN EXP DATE BLD: NORMAL
STATUS OF UNIT,%ST: NORMAL
UNIT DIVISION, %UDIV: 0

## 2017-06-02 PROCEDURE — 74011250636 HC RX REV CODE- 250/636: Performed by: NURSE PRACTITIONER

## 2017-06-02 PROCEDURE — 96375 TX/PRO/DX INJ NEW DRUG ADDON: CPT

## 2017-06-02 PROCEDURE — 74011000258 HC RX REV CODE- 258: Performed by: NURSE PRACTITIONER

## 2017-06-02 PROCEDURE — 96417 CHEMO IV INFUS EACH ADDL SEQ: CPT

## 2017-06-02 PROCEDURE — 96413 CHEMO IV INFUSION 1 HR: CPT

## 2017-06-02 RX ORDER — ONDANSETRON 2 MG/ML
8 INJECTION INTRAMUSCULAR; INTRAVENOUS ONCE
Status: COMPLETED | OUTPATIENT
Start: 2017-06-02 | End: 2017-06-02

## 2017-06-02 RX ORDER — SODIUM CHLORIDE 0.9 % (FLUSH) 0.9 %
10 SYRINGE (ML) INJECTION AS NEEDED
Status: ACTIVE | OUTPATIENT
Start: 2017-06-02 | End: 2017-06-02

## 2017-06-02 RX ORDER — HEPARIN 100 UNIT/ML
300-500 SYRINGE INTRAVENOUS AS NEEDED
Status: ACTIVE | OUTPATIENT
Start: 2017-06-02 | End: 2017-06-02

## 2017-06-02 RX ORDER — DEXAMETHASONE SODIUM PHOSPHATE 100 MG/10ML
10 INJECTION INTRAMUSCULAR; INTRAVENOUS ONCE
Status: COMPLETED | OUTPATIENT
Start: 2017-06-02 | End: 2017-06-02

## 2017-06-02 RX ADMIN — ONDANSETRON 8 MG: 2 INJECTION INTRAMUSCULAR; INTRAVENOUS at 11:47

## 2017-06-02 RX ADMIN — SODIUM CHLORIDE 500 ML: 900 INJECTION, SOLUTION INTRAVENOUS at 11:44

## 2017-06-02 RX ADMIN — CARBOPLATIN 314 MG: 10 INJECTION, SOLUTION INTRAVENOUS at 13:07

## 2017-06-02 RX ADMIN — SODIUM CHLORIDE 1400 MG: 900 INJECTION, SOLUTION INTRAVENOUS at 12:28

## 2017-06-02 RX ADMIN — DEXAMETHASONE SODIUM PHOSPHATE 10 MG: 10 INJECTION INTRAMUSCULAR; INTRAVENOUS at 11:49

## 2017-06-02 NOTE — PROGRESS NOTES
Arrived to the [facility]. Chemo completed. Patient tolerated well. PIV removed intact, site clear. Any issues or concerns during appointment: None. Patient aware of next infusion appointment on 6/8 (date) at [phone] (time). Discharged ambulatory in stable condition.

## 2017-06-05 ENCOUNTER — APPOINTMENT (OUTPATIENT)
Dept: INFUSION THERAPY | Age: 72
End: 2017-06-05

## 2017-06-08 ENCOUNTER — HOSPITAL ENCOUNTER (OUTPATIENT)
Dept: LAB | Age: 72
Discharge: HOME | End: 2017-06-08
Payer: MEDICARE

## 2017-06-08 ENCOUNTER — HOSPITAL ENCOUNTER (OUTPATIENT)
Dept: INFUSION THERAPY | Age: 72
Discharge: HOME | End: 2017-06-08
Payer: MEDICARE

## 2017-06-08 DIAGNOSIS — C34.91 NON-SMALL CELL LUNG CANCER, RIGHT (HCC): ICD-10-CM

## 2017-06-08 DIAGNOSIS — C34.90 NON-SMALL CELL LUNG CANCER, UNSPECIFIED LATERALITY (HCC): Chronic | ICD-10-CM

## 2017-06-08 LAB
ALBUMIN SERPL BCP-MCNC: 3.6 G/DL (ref 3.2–4.6)
ALBUMIN/GLOB SERPL: 1.2 {RATIO} (ref 1.2–3.5)
ALP SERPL-CCNC: 78 U/L (ref 50–136)
ALT SERPL-CCNC: 36 U/L (ref 12–65)
ANION GAP BLD CALC-SCNC: 8 MMOL/L (ref 7–16)
AST SERPL W P-5'-P-CCNC: 44 U/L (ref 15–37)
BASOPHILS # BLD AUTO: 0 K/UL (ref 0–0.2)
BASOPHILS # BLD: 2 % (ref 0–2)
BILIRUB SERPL-MCNC: 0.5 MG/DL (ref 0.2–1.1)
BUN SERPL-MCNC: 15 MG/DL (ref 8–23)
CALCIUM SERPL-MCNC: 8.7 MG/DL (ref 8.3–10.4)
CHLORIDE SERPL-SCNC: 103 MMOL/L (ref 98–107)
CO2 SERPL-SCNC: 30 MMOL/L (ref 21–32)
CREAT SERPL-MCNC: 0.68 MG/DL (ref 0.6–1)
DIFFERENTIAL METHOD BLD: ABNORMAL
EOSINOPHIL # BLD: 0 K/UL (ref 0–0.8)
EOSINOPHIL NFR BLD: 1 % (ref 0.5–7.8)
ERYTHROCYTE [DISTWIDTH] IN BLOOD BY AUTOMATED COUNT: 19 % (ref 11.9–14.6)
GLOBULIN SER CALC-MCNC: 3.1 G/DL (ref 2.3–3.5)
GLUCOSE SERPL-MCNC: 109 MG/DL (ref 65–100)
HCT VFR BLD AUTO: 29.9 % (ref 35.8–46.3)
HGB BLD-MCNC: 9.7 G/DL (ref 11.7–15.4)
LYMPHOCYTES # BLD AUTO: 36 % (ref 13–44)
LYMPHOCYTES # BLD: 0.8 K/UL (ref 0.5–4.6)
MCH RBC QN AUTO: 34 PG (ref 26.1–32.9)
MCHC RBC AUTO-ENTMCNC: 32.4 G/DL (ref 31.4–35)
MCV RBC AUTO: 104.9 FL (ref 79.6–97.8)
MONOCYTES # BLD: 0.1 K/UL (ref 0.1–1.3)
MONOCYTES NFR BLD AUTO: 4 % (ref 4–12)
NEUTS SEG # BLD: 1.3 K/UL (ref 1.7–8.2)
NEUTS SEG NFR BLD AUTO: 59 % (ref 43–78)
NRBC # BLD: 0 K/UL (ref 0–0.2)
PLATELET # BLD AUTO: 90 K/UL (ref 150–450)
PMV BLD AUTO: 10 FL (ref 10.8–14.1)
POTASSIUM SERPL-SCNC: 3.8 MMOL/L (ref 3.5–5.1)
PROT SERPL-MCNC: 6.7 G/DL (ref 6.3–8.2)
RBC # BLD AUTO: 2.85 M/UL (ref 4.05–5.25)
SODIUM SERPL-SCNC: 141 MMOL/L (ref 136–145)
WBC # BLD AUTO: 2.2 K/UL (ref 4.3–11.1)

## 2017-06-08 PROCEDURE — 74011250636 HC RX REV CODE- 250/636: Performed by: NURSE PRACTITIONER

## 2017-06-08 PROCEDURE — 96413 CHEMO IV INFUSION 1 HR: CPT

## 2017-06-08 PROCEDURE — 96375 TX/PRO/DX INJ NEW DRUG ADDON: CPT

## 2017-06-08 RX ORDER — DEXAMETHASONE SODIUM PHOSPHATE 100 MG/10ML
10 INJECTION INTRAMUSCULAR; INTRAVENOUS ONCE
Status: COMPLETED | OUTPATIENT
Start: 2017-06-08 | End: 2017-06-08

## 2017-06-08 RX ORDER — SODIUM CHLORIDE 0.9 % (FLUSH) 0.9 %
10 SYRINGE (ML) INJECTION AS NEEDED
Status: ACTIVE | OUTPATIENT
Start: 2017-06-08 | End: 2017-06-08

## 2017-06-08 RX ORDER — METOCLOPRAMIDE HYDROCHLORIDE 5 MG/ML
10 INJECTION INTRAMUSCULAR; INTRAVENOUS ONCE
Status: COMPLETED | OUTPATIENT
Start: 2017-06-08 | End: 2017-06-08

## 2017-06-08 RX ORDER — HEPARIN 100 UNIT/ML
300-500 SYRINGE INTRAVENOUS AS NEEDED
Status: ACTIVE | OUTPATIENT
Start: 2017-06-08 | End: 2017-06-08

## 2017-06-08 RX ADMIN — SODIUM CHLORIDE 1400 MG: 900 INJECTION, SOLUTION INTRAVENOUS at 13:02

## 2017-06-08 RX ADMIN — SODIUM CHLORIDE 500 ML: 900 INJECTION, SOLUTION INTRAVENOUS at 12:42

## 2017-06-08 RX ADMIN — METOCLOPRAMIDE 10 MG: 5 INJECTION, SOLUTION INTRAMUSCULAR; INTRAVENOUS at 12:40

## 2017-06-08 RX ADMIN — DEXAMETHASONE SODIUM PHOSPHATE 10 MG: 10 INJECTION INTRAMUSCULAR; INTRAVENOUS at 12:38

## 2017-06-08 RX ADMIN — Medication 10 ML: at 13:35

## 2017-06-08 NOTE — PROGRESS NOTES
Arrived to the [facility]. [id number] gemzar completed. Patient tolerated well.    Any issues or concerns during appointment: no.  Discharged ambulatory

## 2017-06-15 ENCOUNTER — HOSPITAL ENCOUNTER (OUTPATIENT)
Dept: INFUSION THERAPY | Age: 72
Discharge: HOME | End: 2017-06-15
Payer: MEDICARE

## 2017-06-15 VITALS
HEART RATE: 79 BPM | RESPIRATION RATE: 18 BRPM | TEMPERATURE: 97.9 F | SYSTOLIC BLOOD PRESSURE: 102 MMHG | WEIGHT: 144 LBS | BODY MASS INDEX: 24.72 KG/M2 | DIASTOLIC BLOOD PRESSURE: 51 MMHG | OXYGEN SATURATION: 93 %

## 2017-06-15 DIAGNOSIS — C34.91 NON-SMALL CELL LUNG CANCER, RIGHT (HCC): ICD-10-CM

## 2017-06-15 DIAGNOSIS — R11.0 NAUSEA: ICD-10-CM

## 2017-06-15 DIAGNOSIS — R11.2 NAUSEA AND VOMITING, INTRACTABILITY OF VOMITING NOT SPECIFIED, UNSPECIFIED VOMITING TYPE: ICD-10-CM

## 2017-06-15 LAB
BASOPHILS # BLD AUTO: 0 K/UL (ref 0–0.2)
BASOPHILS # BLD: 0 % (ref 0–2)
DIFFERENTIAL METHOD BLD: ABNORMAL
EOSINOPHIL # BLD: 0 K/UL (ref 0–0.8)
EOSINOPHIL NFR BLD: 1 % (ref 0.5–7.8)
ERYTHROCYTE [DISTWIDTH] IN BLOOD BY AUTOMATED COUNT: 18 % (ref 11.9–14.6)
HCT VFR BLD AUTO: 23.6 % (ref 35.8–46.3)
HGB BLD-MCNC: 7.9 G/DL (ref 11.7–15.4)
LYMPHOCYTES # BLD AUTO: 22 % (ref 13–44)
LYMPHOCYTES # BLD: 0.8 K/UL (ref 0.5–4.6)
MCH RBC QN AUTO: 34.2 PG (ref 26.1–32.9)
MCHC RBC AUTO-ENTMCNC: 33.5 G/DL (ref 31.4–35)
MCV RBC AUTO: 102.2 FL (ref 79.6–97.8)
MONOCYTES # BLD: 0.1 K/UL (ref 0.1–1.3)
MONOCYTES NFR BLD AUTO: 2 % (ref 4–12)
NEUTS SEG # BLD: 2.6 K/UL (ref 1.7–8.2)
NEUTS SEG NFR BLD AUTO: 75 % (ref 43–78)
NRBC # BLD: 0 K/UL (ref 0–0.2)
PLATELET # BLD AUTO: 3 K/UL (ref 150–450)
PMV BLD AUTO: 8.1 FL (ref 10.8–14.1)
RBC # BLD AUTO: 2.31 M/UL (ref 4.05–5.25)
WBC # BLD AUTO: 3.4 K/UL (ref 4.3–11.1)

## 2017-06-15 PROCEDURE — 74011250636 HC RX REV CODE- 250/636: Performed by: NURSE PRACTITIONER

## 2017-06-15 PROCEDURE — 74011250636 HC RX REV CODE- 250/636: Performed by: INTERNAL MEDICINE

## 2017-06-15 PROCEDURE — 74011250637 HC RX REV CODE- 250/637: Performed by: INTERNAL MEDICINE

## 2017-06-15 PROCEDURE — 36430 TRANSFUSION BLD/BLD COMPNT: CPT

## 2017-06-15 PROCEDURE — 77030018667 ADMN ST IV BLD FENW -A

## 2017-06-15 PROCEDURE — 96375 TX/PRO/DX INJ NEW DRUG ADDON: CPT

## 2017-06-15 PROCEDURE — P9037 PLATE PHERES LEUKOREDU IRRAD: HCPCS | Performed by: INTERNAL MEDICINE

## 2017-06-15 PROCEDURE — P9035 PLATELET PHERES LEUKOREDUCED: HCPCS | Performed by: INTERNAL MEDICINE

## 2017-06-15 PROCEDURE — 74011250637 HC RX REV CODE- 250/637: Performed by: NURSE PRACTITIONER

## 2017-06-15 PROCEDURE — 85025 COMPLETE CBC W/AUTO DIFF WBC: CPT | Performed by: INTERNAL MEDICINE

## 2017-06-15 PROCEDURE — 96374 THER/PROPH/DIAG INJ IV PUSH: CPT

## 2017-06-15 PROCEDURE — 96361 HYDRATE IV INFUSION ADD-ON: CPT

## 2017-06-15 RX ORDER — SODIUM CHLORIDE 9 MG/ML
500 INJECTION, SOLUTION INTRAVENOUS ONCE
Status: COMPLETED | OUTPATIENT
Start: 2017-06-15 | End: 2017-06-15

## 2017-06-15 RX ORDER — SODIUM CHLORIDE 9 MG/ML
1000 INJECTION, SOLUTION INTRAVENOUS ONCE
Status: COMPLETED | OUTPATIENT
Start: 2017-06-15 | End: 2017-06-15

## 2017-06-15 RX ORDER — DEXAMETHASONE SODIUM PHOSPHATE 100 MG/10ML
10 INJECTION INTRAMUSCULAR; INTRAVENOUS ONCE
Status: COMPLETED | OUTPATIENT
Start: 2017-06-15 | End: 2017-06-15

## 2017-06-15 RX ORDER — ONDANSETRON 2 MG/ML
8 INJECTION INTRAMUSCULAR; INTRAVENOUS AS NEEDED
Status: DISCONTINUED | OUTPATIENT
Start: 2017-06-15 | End: 2017-06-15

## 2017-06-15 RX ORDER — DIPHENHYDRAMINE HCL 25 MG
25 CAPSULE ORAL ONCE
Status: COMPLETED | OUTPATIENT
Start: 2017-06-15 | End: 2017-06-15

## 2017-06-15 RX ORDER — PROCHLORPERAZINE EDISYLATE 5 MG/ML
10 INJECTION INTRAMUSCULAR; INTRAVENOUS ONCE
Status: COMPLETED | OUTPATIENT
Start: 2017-06-15 | End: 2017-06-15

## 2017-06-15 RX ORDER — SODIUM CHLORIDE 9 MG/ML
250 INJECTION, SOLUTION INTRAVENOUS AS NEEDED
Status: DISCONTINUED | OUTPATIENT
Start: 2017-06-15 | End: 2017-06-15

## 2017-06-15 RX ORDER — LORAZEPAM 0.5 MG/1
1 TABLET ORAL ONCE
Status: COMPLETED | OUTPATIENT
Start: 2017-06-15 | End: 2017-06-15

## 2017-06-15 RX ORDER — ACETAMINOPHEN 325 MG/1
650 TABLET ORAL ONCE
Status: COMPLETED | OUTPATIENT
Start: 2017-06-15 | End: 2017-06-15

## 2017-06-15 RX ORDER — SODIUM CHLORIDE 0.9 % (FLUSH) 0.9 %
10 SYRINGE (ML) INJECTION AS NEEDED
Status: ACTIVE | OUTPATIENT
Start: 2017-06-15 | End: 2017-06-16

## 2017-06-15 RX ADMIN — ACETAMINOPHEN 650 MG: 325 TABLET, FILM COATED ORAL at 16:19

## 2017-06-15 RX ADMIN — DEXAMETHASONE SODIUM PHOSPHATE 10 MG: 10 INJECTION INTRAMUSCULAR; INTRAVENOUS at 14:46

## 2017-06-15 RX ADMIN — Medication 10 ML: at 14:35

## 2017-06-15 RX ADMIN — SODIUM CHLORIDE 500 ML: 900 INJECTION, SOLUTION INTRAVENOUS at 16:07

## 2017-06-15 RX ADMIN — SODIUM CHLORIDE 1000 ML: 900 INJECTION, SOLUTION INTRAVENOUS at 14:35

## 2017-06-15 RX ADMIN — PROCHLORPERAZINE EDISYLATE 10 MG: 5 INJECTION INTRAMUSCULAR; INTRAVENOUS at 16:05

## 2017-06-15 RX ADMIN — DIPHENHYDRAMINE HYDROCHLORIDE 25 MG: 25 CAPSULE ORAL at 16:19

## 2017-06-15 RX ADMIN — LORAZEPAM 1 MG: 0.5 TABLET ORAL at 16:03

## 2017-06-15 NOTE — PROGRESS NOTES
Arrived to [facility] to receive IV fluids and nausea medicine. Upon arrival, patient hypotensive with c/o nausea and vomiting and burning/indigestion sensation. Patient reports running fevers the last 2 nights of 100. Patient afebrile at this time. Vanda Kidd, RN navigator, notified. Prescription to be called in for Carafate PO.  CBC drawn and sent to lab. Patient to receive Decadron for nausea. Patient reports having \"light-headedness and dizziness\" whenever she takes Zofran so Zofran not administered. Approximately 20 minutes after Decadron administered, patient with nausea and vomiting. New orders received for Ativan PO and Compazine IV. Platelets resulted 3.  1 unit platelets administered per protocol. Patient tolerated well. Aware of next appointment on 6/22 at 9:45 AM.  Discharged via w/c accompanied by spouse.

## 2017-06-16 LAB
BLD PROD TYP BPU: NORMAL
BPU ID: NORMAL
STATUS OF UNIT,%ST: NORMAL
UNIT DIVISION, %UDIV: 0

## 2017-06-21 ENCOUNTER — HOSPITAL ENCOUNTER (OUTPATIENT)
Dept: LAB | Age: 72
Discharge: HOME | End: 2017-06-21
Payer: MEDICARE

## 2017-06-21 DIAGNOSIS — C34.90 NON-SMALL CELL LUNG CANCER, UNSPECIFIED LATERALITY (HCC): Chronic | ICD-10-CM

## 2017-06-21 LAB
ALBUMIN SERPL BCP-MCNC: 3.1 G/DL (ref 3.2–4.6)
ALBUMIN/GLOB SERPL: 1 {RATIO} (ref 1.2–3.5)
ALP SERPL-CCNC: 86 U/L (ref 50–136)
ALT SERPL-CCNC: 32 U/L (ref 12–65)
ANION GAP BLD CALC-SCNC: 7 MMOL/L (ref 7–16)
AST SERPL W P-5'-P-CCNC: 38 U/L (ref 15–37)
BASOPHILS # BLD AUTO: 0 K/UL (ref 0–0.2)
BASOPHILS # BLD: 0 % (ref 0–2)
BILIRUB SERPL-MCNC: 0.3 MG/DL (ref 0.2–1.1)
BUN SERPL-MCNC: 5 MG/DL (ref 8–23)
CALCIUM SERPL-MCNC: 8 MG/DL (ref 8.3–10.4)
CHLORIDE SERPL-SCNC: 104 MMOL/L (ref 98–107)
CO2 SERPL-SCNC: 31 MMOL/L (ref 21–32)
CREAT SERPL-MCNC: 0.83 MG/DL (ref 0.6–1)
DIFFERENTIAL METHOD BLD: ABNORMAL
EOSINOPHIL # BLD: 0 K/UL (ref 0–0.8)
EOSINOPHIL NFR BLD: 2 % (ref 0.5–7.8)
ERYTHROCYTE [DISTWIDTH] IN BLOOD BY AUTOMATED COUNT: 19.3 % (ref 11.9–14.6)
GLOBULIN SER CALC-MCNC: 3 G/DL (ref 2.3–3.5)
GLUCOSE SERPL-MCNC: 132 MG/DL (ref 65–100)
HCT VFR BLD AUTO: 22.1 % (ref 35.8–46.3)
HGB BLD-MCNC: 7.2 G/DL (ref 11.7–15.4)
LYMPHOCYTES # BLD AUTO: 34 % (ref 13–44)
LYMPHOCYTES # BLD: 0.8 K/UL (ref 0.5–4.6)
MCH RBC QN AUTO: 34.3 PG (ref 26.1–32.9)
MCHC RBC AUTO-ENTMCNC: 32.6 G/DL (ref 31.4–35)
MCV RBC AUTO: 105.2 FL (ref 79.6–97.8)
MONOCYTES # BLD: 0.6 K/UL (ref 0.1–1.3)
MONOCYTES NFR BLD AUTO: 24 % (ref 4–12)
NEUTS SEG # BLD: 1 K/UL (ref 1.7–8.2)
NEUTS SEG NFR BLD AUTO: 41 % (ref 43–78)
NRBC # BLD: 0 K/UL (ref 0–0.2)
PLATELET # BLD AUTO: 54 K/UL (ref 150–450)
PMV BLD AUTO: 10.7 FL (ref 10.8–14.1)
POTASSIUM SERPL-SCNC: 3.1 MMOL/L (ref 3.5–5.1)
PROT SERPL-MCNC: 6.1 G/DL (ref 6.3–8.2)
RBC # BLD AUTO: 2.1 M/UL (ref 4.05–5.25)
SODIUM SERPL-SCNC: 142 MMOL/L (ref 136–145)
WBC # BLD AUTO: 2.4 K/UL (ref 4.3–11.1)

## 2017-06-21 PROCEDURE — 80053 COMPREHEN METABOLIC PANEL: CPT | Performed by: INTERNAL MEDICINE

## 2017-06-21 PROCEDURE — 36415 COLL VENOUS BLD VENIPUNCTURE: CPT | Performed by: INTERNAL MEDICINE

## 2017-06-21 PROCEDURE — 86923 COMPATIBILITY TEST ELECTRIC: CPT | Performed by: INTERNAL MEDICINE

## 2017-06-21 PROCEDURE — 85025 COMPLETE CBC W/AUTO DIFF WBC: CPT | Performed by: INTERNAL MEDICINE

## 2017-06-21 PROCEDURE — 86900 BLOOD TYPING SEROLOGIC ABO: CPT | Performed by: INTERNAL MEDICINE

## 2017-06-22 ENCOUNTER — HOSPITAL ENCOUNTER (OUTPATIENT)
Dept: INFUSION THERAPY | Age: 72
Discharge: HOME | End: 2017-06-22
Payer: MEDICARE

## 2017-06-22 VITALS
SYSTOLIC BLOOD PRESSURE: 86 MMHG | TEMPERATURE: 98.1 F | WEIGHT: 146.4 LBS | OXYGEN SATURATION: 95 % | HEART RATE: 85 BPM | DIASTOLIC BLOOD PRESSURE: 58 MMHG | RESPIRATION RATE: 16 BRPM | BODY MASS INDEX: 25.13 KG/M2

## 2017-06-22 PROCEDURE — 77030018667 ADMN ST IV BLD FENW -A

## 2017-06-22 PROCEDURE — P9016 RBC LEUKOCYTES REDUCED: HCPCS | Performed by: INTERNAL MEDICINE

## 2017-06-22 PROCEDURE — 36430 TRANSFUSION BLD/BLD COMPNT: CPT

## 2017-06-22 PROCEDURE — 74011250636 HC RX REV CODE- 250/636: Performed by: INTERNAL MEDICINE

## 2017-06-22 PROCEDURE — 74011250637 HC RX REV CODE- 250/637: Performed by: INTERNAL MEDICINE

## 2017-06-22 RX ORDER — ACETAMINOPHEN 325 MG/1
650 TABLET ORAL ONCE
Status: COMPLETED | OUTPATIENT
Start: 2017-06-22 | End: 2017-06-22

## 2017-06-22 RX ORDER — SODIUM CHLORIDE 9 MG/ML
250 INJECTION, SOLUTION INTRAVENOUS AS NEEDED
Status: DISCONTINUED | OUTPATIENT
Start: 2017-06-22 | End: 2017-06-26 | Stop reason: HOSPADM

## 2017-06-22 RX ORDER — DIPHENHYDRAMINE HCL 25 MG
25 CAPSULE ORAL ONCE
Status: COMPLETED | OUTPATIENT
Start: 2017-06-22 | End: 2017-06-22

## 2017-06-22 RX ADMIN — SODIUM CHLORIDE 250 ML: 900 INJECTION, SOLUTION INTRAVENOUS at 12:13

## 2017-06-22 RX ADMIN — DIPHENHYDRAMINE HYDROCHLORIDE 25 MG: 25 CAPSULE ORAL at 12:13

## 2017-06-22 RX ADMIN — ACETAMINOPHEN 650 MG: 325 TABLET, FILM COATED ORAL at 12:13

## 2017-06-29 ENCOUNTER — APPOINTMENT (OUTPATIENT)
Dept: INFUSION THERAPY | Age: 72
End: 2017-06-29
Payer: MEDICARE

## 2017-07-07 ENCOUNTER — HOSPITAL ENCOUNTER (OUTPATIENT)
Dept: LAB | Age: 72
Discharge: HOME | End: 2017-07-07
Payer: MEDICARE

## 2017-07-07 DIAGNOSIS — C34.90 NON-SMALL CELL LUNG CANCER, UNSPECIFIED LATERALITY (HCC): Chronic | ICD-10-CM

## 2017-07-07 LAB
ALBUMIN SERPL BCP-MCNC: 3.6 G/DL (ref 3.2–4.6)
ALBUMIN/GLOB SERPL: 1.1 {RATIO} (ref 1.2–3.5)
ALP SERPL-CCNC: 89 U/L (ref 50–136)
ALT SERPL-CCNC: 32 U/L (ref 12–65)
ANION GAP BLD CALC-SCNC: 8 MMOL/L (ref 7–16)
AST SERPL W P-5'-P-CCNC: 44 U/L (ref 15–37)
BASOPHILS # BLD AUTO: 0 K/UL (ref 0–0.2)
BASOPHILS # BLD: 0 % (ref 0–2)
BILIRUB SERPL-MCNC: 0.5 MG/DL (ref 0.2–1.1)
BUN SERPL-MCNC: 9 MG/DL (ref 8–23)
CALCIUM SERPL-MCNC: 8.8 MG/DL (ref 8.3–10.4)
CHLORIDE SERPL-SCNC: 103 MMOL/L (ref 98–107)
CO2 SERPL-SCNC: 30 MMOL/L (ref 21–32)
CREAT SERPL-MCNC: 0.66 MG/DL (ref 0.6–1)
DIFFERENTIAL METHOD BLD: ABNORMAL
EOSINOPHIL # BLD: 0 K/UL (ref 0–0.8)
EOSINOPHIL NFR BLD: 0 % (ref 0.5–7.8)
ERYTHROCYTE [DISTWIDTH] IN BLOOD BY AUTOMATED COUNT: 20.9 % (ref 11.9–14.6)
GLOBULIN SER CALC-MCNC: 3.3 G/DL (ref 2.3–3.5)
GLUCOSE SERPL-MCNC: 88 MG/DL (ref 65–100)
HCT VFR BLD AUTO: 33.5 % (ref 35.8–46.3)
HGB BLD-MCNC: 10.9 G/DL (ref 11.7–15.4)
LYMPHOCYTES # BLD AUTO: 29 % (ref 13–44)
LYMPHOCYTES # BLD: 1.5 K/UL (ref 0.5–4.6)
MCH RBC QN AUTO: 34.4 PG (ref 26.1–32.9)
MCHC RBC AUTO-ENTMCNC: 32.5 G/DL (ref 31.4–35)
MCV RBC AUTO: 105.7 FL (ref 79.6–97.8)
MONOCYTES # BLD: 0.7 K/UL (ref 0.1–1.3)
MONOCYTES NFR BLD AUTO: 13 % (ref 4–12)
NEUTS SEG # BLD: 3 K/UL (ref 1.7–8.2)
NEUTS SEG NFR BLD AUTO: 57 % (ref 43–78)
NRBC # BLD: 0 K/UL (ref 0–0.2)
PLATELET # BLD AUTO: 160 K/UL (ref 150–450)
PMV BLD AUTO: 8.6 FL (ref 10.8–14.1)
POTASSIUM SERPL-SCNC: 3.6 MMOL/L (ref 3.5–5.1)
PROT SERPL-MCNC: 6.9 G/DL (ref 6.3–8.2)
RBC # BLD AUTO: 3.17 M/UL (ref 4.05–5.25)
SODIUM SERPL-SCNC: 141 MMOL/L (ref 136–145)
WBC # BLD AUTO: 5.2 K/UL (ref 4.3–11.1)

## 2017-07-07 PROCEDURE — 80053 COMPREHEN METABOLIC PANEL: CPT | Performed by: INTERNAL MEDICINE

## 2017-07-07 PROCEDURE — 85025 COMPLETE CBC W/AUTO DIFF WBC: CPT | Performed by: INTERNAL MEDICINE

## 2017-07-07 PROCEDURE — 36415 COLL VENOUS BLD VENIPUNCTURE: CPT | Performed by: INTERNAL MEDICINE

## 2017-07-21 ENCOUNTER — HOSPITAL ENCOUNTER (OUTPATIENT)
Dept: CT IMAGING | Age: 72
Discharge: HOME | End: 2017-07-21
Attending: INTERNAL MEDICINE
Payer: MEDICARE

## 2017-07-21 DIAGNOSIS — C34.91 NON-SMALL CELL LUNG CANCER, RIGHT (HCC): Chronic | ICD-10-CM

## 2017-07-21 PROCEDURE — 74177 CT ABD & PELVIS W/CONTRAST: CPT

## 2017-07-21 PROCEDURE — 74011636320 HC RX REV CODE- 636/320: Performed by: INTERNAL MEDICINE

## 2017-07-21 PROCEDURE — 74011000258 HC RX REV CODE- 258: Performed by: INTERNAL MEDICINE

## 2017-07-21 RX ORDER — SODIUM CHLORIDE 0.9 % (FLUSH) 0.9 %
10 SYRINGE (ML) INJECTION
Status: COMPLETED | OUTPATIENT
Start: 2017-07-21 | End: 2017-07-21

## 2017-07-21 RX ADMIN — SODIUM CHLORIDE 100 ML: 900 INJECTION, SOLUTION INTRAVENOUS at 13:53

## 2017-07-21 RX ADMIN — DIATRIZOATE MEGLUMINE AND DIATRIZOATE SODIUM 15 ML: 660; 100 LIQUID ORAL; RECTAL at 13:53

## 2017-07-21 RX ADMIN — IOPAMIDOL 100 ML: 755 INJECTION, SOLUTION INTRAVENOUS at 13:53

## 2017-07-21 RX ADMIN — Medication 10 ML: at 13:53

## 2017-07-25 ENCOUNTER — HOSPITAL ENCOUNTER (OUTPATIENT)
Dept: LAB | Age: 72
Discharge: HOME | End: 2017-07-25
Payer: MEDICARE

## 2017-07-25 DIAGNOSIS — C34.90 NON-SMALL CELL LUNG CANCER, UNSPECIFIED LATERALITY (HCC): Chronic | ICD-10-CM

## 2017-07-25 DIAGNOSIS — C34.91 NON-SMALL CELL LUNG CANCER, RIGHT (HCC): Chronic | ICD-10-CM

## 2017-07-25 DIAGNOSIS — Z01.812 PRE-PROCEDURE LAB EXAM: ICD-10-CM

## 2017-07-25 LAB
ALBUMIN SERPL BCP-MCNC: 3.3 G/DL (ref 3.2–4.6)
ALBUMIN/GLOB SERPL: 1 {RATIO} (ref 1.2–3.5)
ALP SERPL-CCNC: 75 U/L (ref 50–136)
ALT SERPL-CCNC: 34 U/L (ref 12–65)
ANION GAP BLD CALC-SCNC: 3 MMOL/L (ref 7–16)
APTT PPP: 20.9 SEC (ref 23.5–31.7)
AST SERPL W P-5'-P-CCNC: 30 U/L (ref 15–37)
BASOPHILS # BLD AUTO: 0 K/UL (ref 0–0.2)
BASOPHILS # BLD: 0 % (ref 0–2)
BILIRUB SERPL-MCNC: 0.5 MG/DL (ref 0.2–1.1)
BUN SERPL-MCNC: 11 MG/DL (ref 8–23)
CALCIUM SERPL-MCNC: 8.7 MG/DL (ref 8.3–10.4)
CHLORIDE SERPL-SCNC: 104 MMOL/L (ref 98–107)
CO2 SERPL-SCNC: 33 MMOL/L (ref 21–32)
CREAT SERPL-MCNC: 0.69 MG/DL (ref 0.6–1)
DIFFERENTIAL METHOD BLD: ABNORMAL
EOSINOPHIL # BLD: 0.1 K/UL (ref 0–0.8)
EOSINOPHIL NFR BLD: 1 % (ref 0.5–7.8)
ERYTHROCYTE [DISTWIDTH] IN BLOOD BY AUTOMATED COUNT: 18 % (ref 11.9–14.6)
GLOBULIN SER CALC-MCNC: 3.2 G/DL (ref 2.3–3.5)
GLUCOSE SERPL-MCNC: 90 MG/DL (ref 65–100)
HCT VFR BLD AUTO: 39 % (ref 35.8–46.3)
HGB BLD-MCNC: 12.8 G/DL (ref 11.7–15.4)
INR PPP: 1 (ref 0.9–1.2)
LYMPHOCYTES # BLD AUTO: 22 % (ref 13–44)
LYMPHOCYTES # BLD: 1.6 K/UL (ref 0.5–4.6)
MCH RBC QN AUTO: 35 PG (ref 26.1–32.9)
MCHC RBC AUTO-ENTMCNC: 32.8 G/DL (ref 31.4–35)
MCV RBC AUTO: 106.6 FL (ref 79.6–97.8)
MONOCYTES # BLD: 0.6 K/UL (ref 0.1–1.3)
MONOCYTES NFR BLD AUTO: 8 % (ref 4–12)
NEUTS SEG # BLD: 5 K/UL (ref 1.7–8.2)
NEUTS SEG NFR BLD AUTO: 69 % (ref 43–78)
NRBC # BLD: 0 K/UL (ref 0–0.2)
PLATELET # BLD AUTO: 145 K/UL (ref 150–450)
PMV BLD AUTO: 8.4 FL (ref 10.8–14.1)
POTASSIUM SERPL-SCNC: 3.6 MMOL/L (ref 3.5–5.1)
PROT SERPL-MCNC: 6.5 G/DL (ref 6.3–8.2)
PROTHROMBIN TIME: 10.4 SEC (ref 9.6–12)
RBC # BLD AUTO: 3.66 M/UL (ref 4.05–5.25)
SODIUM SERPL-SCNC: 140 MMOL/L (ref 136–145)
WBC # BLD AUTO: 7.2 K/UL (ref 4.3–11.1)

## 2017-07-25 PROCEDURE — 85730 THROMBOPLASTIN TIME PARTIAL: CPT | Performed by: INTERNAL MEDICINE

## 2017-07-25 PROCEDURE — 36415 COLL VENOUS BLD VENIPUNCTURE: CPT | Performed by: INTERNAL MEDICINE

## 2017-07-25 PROCEDURE — 80053 COMPREHEN METABOLIC PANEL: CPT | Performed by: INTERNAL MEDICINE

## 2017-07-25 PROCEDURE — 85025 COMPLETE CBC W/AUTO DIFF WBC: CPT | Performed by: INTERNAL MEDICINE

## 2017-07-25 PROCEDURE — 85610 PROTHROMBIN TIME: CPT | Performed by: INTERNAL MEDICINE

## 2017-08-10 ENCOUNTER — HOSPITAL ENCOUNTER (OUTPATIENT)
Dept: INTERVENTIONAL RADIOLOGY/VASCULAR | Age: 72
Discharge: HOME | End: 2017-08-10
Attending: INTERNAL MEDICINE
Payer: MEDICARE

## 2017-08-10 VITALS
OXYGEN SATURATION: 91 % | SYSTOLIC BLOOD PRESSURE: 133 MMHG | TEMPERATURE: 98.4 F | RESPIRATION RATE: 16 BRPM | DIASTOLIC BLOOD PRESSURE: 63 MMHG | HEART RATE: 69 BPM

## 2017-08-10 DIAGNOSIS — C34.90 NON-SMALL CELL LUNG CANCER, UNSPECIFIED LATERALITY (HCC): Chronic | ICD-10-CM

## 2017-08-10 PROCEDURE — 36561 INSERT TUNNELED CV CATH: CPT

## 2017-08-10 PROCEDURE — 77030020851 HC CAUT BTRY HI AARM -A

## 2017-08-10 PROCEDURE — 74011000250 HC RX REV CODE- 250: Performed by: RADIOLOGY

## 2017-08-10 PROCEDURE — 74011250636 HC RX REV CODE- 250/636: Performed by: RADIOLOGY

## 2017-08-10 PROCEDURE — 77030003028 HC SUT VCRL J&J -A

## 2017-08-10 PROCEDURE — 77030002916 HC SUT ETHLN J&J -A

## 2017-08-10 PROCEDURE — 99153 MOD SED SAME PHYS/QHP EA: CPT

## 2017-08-10 PROCEDURE — 99152 MOD SED SAME PHYS/QHP 5/>YRS: CPT

## 2017-08-10 PROCEDURE — C1894 INTRO/SHEATH, NON-LASER: HCPCS

## 2017-08-10 PROCEDURE — 74011250636 HC RX REV CODE- 250/636

## 2017-08-10 PROCEDURE — C1788 PORT, INDWELLING, IMP: HCPCS

## 2017-08-10 RX ORDER — MIDAZOLAM HYDROCHLORIDE 1 MG/ML
.25-2 INJECTION, SOLUTION INTRAMUSCULAR; INTRAVENOUS
Status: DISCONTINUED | OUTPATIENT
Start: 2017-08-10 | End: 2017-08-10 | Stop reason: ALTCHOICE

## 2017-08-10 RX ORDER — LIDOCAINE HYDROCHLORIDE 20 MG/ML
20-200 INJECTION, SOLUTION INFILTRATION; PERINEURAL
Status: DISCONTINUED | OUTPATIENT
Start: 2017-08-10 | End: 2017-01-01 | Stop reason: HOSPADM

## 2017-08-10 RX ORDER — LIDOCAINE HYDROCHLORIDE AND EPINEPHRINE 15; 5 MG/ML; UG/ML
1-20 INJECTION, SOLUTION EPIDURAL ONCE
Status: COMPLETED | OUTPATIENT
Start: 2017-08-10 | End: 2017-08-10

## 2017-08-10 RX ORDER — HEPARIN SODIUM (PORCINE) LOCK FLUSH IV SOLN 100 UNIT/ML 100 UNIT/ML
300 SOLUTION INTRAVENOUS AS NEEDED
Status: DISCONTINUED | OUTPATIENT
Start: 2017-08-10 | End: 2017-01-01 | Stop reason: HOSPADM

## 2017-08-10 RX ORDER — CEFAZOLIN SODIUM IN 0.9 % NACL 2 G/50 ML
2 INTRAVENOUS SOLUTION, PIGGYBACK (ML) INTRAVENOUS ONCE
Status: COMPLETED | OUTPATIENT
Start: 2017-08-10 | End: 2017-08-10

## 2017-08-10 RX ORDER — SODIUM CHLORIDE 9 MG/ML
25 INJECTION, SOLUTION INTRAVENOUS ONCE
Status: COMPLETED | OUTPATIENT
Start: 2017-08-10 | End: 2017-08-10

## 2017-08-10 RX ORDER — DIPHENHYDRAMINE HYDROCHLORIDE 50 MG/ML
12.5-5 INJECTION, SOLUTION INTRAMUSCULAR; INTRAVENOUS ONCE
Status: DISCONTINUED | OUTPATIENT
Start: 2017-08-10 | End: 2017-08-10 | Stop reason: ALTCHOICE

## 2017-08-10 RX ORDER — HYDROCODONE BITARTRATE AND ACETAMINOPHEN 5; 325 MG/1; MG/1
1 TABLET ORAL
Status: DISCONTINUED | OUTPATIENT
Start: 2017-08-10 | End: 2017-01-01 | Stop reason: HOSPADM

## 2017-08-10 RX ORDER — HEPARIN SODIUM (PORCINE) LOCK FLUSH IV SOLN 100 UNIT/ML 100 UNIT/ML
500 SOLUTION INTRAVENOUS ONCE
Status: COMPLETED | OUTPATIENT
Start: 2017-08-10 | End: 2017-08-10

## 2017-08-10 RX ORDER — FENTANYL CITRATE 50 UG/ML
25-100 INJECTION, SOLUTION INTRAMUSCULAR; INTRAVENOUS
Status: DISCONTINUED | OUTPATIENT
Start: 2017-08-10 | End: 2017-08-10 | Stop reason: ALTCHOICE

## 2017-08-10 RX ORDER — HEPARIN SODIUM 200 [USP'U]/100ML
1000 INJECTION, SOLUTION INTRAVENOUS
Status: DISCONTINUED | OUTPATIENT
Start: 2017-08-10 | End: 2017-08-10 | Stop reason: ALTCHOICE

## 2017-08-10 RX ADMIN — FENTANYL CITRATE 25 MCG: 50 INJECTION, SOLUTION INTRAMUSCULAR; INTRAVENOUS at 10:04

## 2017-08-10 RX ADMIN — LIDOCAINE HYDROCHLORIDE,EPINEPHRINE BITARTRATE 280 MG: 15; .005 INJECTION, SOLUTION EPIDURAL; INFILTRATION; INTRACAUDAL; PERINEURAL at 09:56

## 2017-08-10 RX ADMIN — MIDAZOLAM HYDROCHLORIDE 0.5 MG: 1 INJECTION, SOLUTION INTRAMUSCULAR; INTRAVENOUS at 09:47

## 2017-08-10 RX ADMIN — SODIUM CHLORIDE 25 ML/HR: 900 INJECTION, SOLUTION INTRAVENOUS at 09:18

## 2017-08-10 RX ADMIN — MIDAZOLAM HYDROCHLORIDE 0.5 MG: 1 INJECTION, SOLUTION INTRAMUSCULAR; INTRAVENOUS at 10:04

## 2017-08-10 RX ADMIN — FENTANYL CITRATE 50 MCG: 50 INJECTION, SOLUTION INTRAMUSCULAR; INTRAVENOUS at 09:30

## 2017-08-10 RX ADMIN — MIDAZOLAM HYDROCHLORIDE 0.5 MG: 1 INJECTION, SOLUTION INTRAMUSCULAR; INTRAVENOUS at 09:40

## 2017-08-10 RX ADMIN — MIDAZOLAM HYDROCHLORIDE 1 MG: 1 INJECTION, SOLUTION INTRAMUSCULAR; INTRAVENOUS at 09:30

## 2017-08-10 RX ADMIN — FENTANYL CITRATE 25 MCG: 50 INJECTION, SOLUTION INTRAMUSCULAR; INTRAVENOUS at 09:40

## 2017-08-10 RX ADMIN — MIDAZOLAM HYDROCHLORIDE 0.5 MG: 1 INJECTION, SOLUTION INTRAMUSCULAR; INTRAVENOUS at 09:35

## 2017-08-10 RX ADMIN — FENTANYL CITRATE 25 MCG: 50 INJECTION, SOLUTION INTRAMUSCULAR; INTRAVENOUS at 09:47

## 2017-08-10 RX ADMIN — FENTANYL CITRATE 25 MCG: 50 INJECTION, SOLUTION INTRAMUSCULAR; INTRAVENOUS at 09:52

## 2017-08-10 RX ADMIN — LIDOCAINE HYDROCHLORIDE 80 MG: 20 INJECTION, SOLUTION INFILTRATION; PERINEURAL at 09:52

## 2017-08-10 RX ADMIN — HEPARIN SODIUM (PORCINE) LOCK FLUSH IV SOLN 100 UNIT/ML 500 UNITS: 100 SOLUTION at 10:15

## 2017-08-10 RX ADMIN — MIDAZOLAM HYDROCHLORIDE 0.5 MG: 1 INJECTION, SOLUTION INTRAMUSCULAR; INTRAVENOUS at 09:52

## 2017-08-10 RX ADMIN — FENTANYL CITRATE 25 MCG: 50 INJECTION, SOLUTION INTRAMUSCULAR; INTRAVENOUS at 09:35

## 2017-08-10 RX ADMIN — HEPARIN SODIUM 2000 UNITS: 200 INJECTION, SOLUTION INTRAVENOUS at 09:53

## 2017-08-10 RX ADMIN — SODIUM BICARBONATE 2 ML: 0.2 INJECTION, SOLUTION INTRAVENOUS at 09:52

## 2017-08-10 RX ADMIN — CEFAZOLIN 2 G: 1 INJECTION, POWDER, FOR SOLUTION INTRAMUSCULAR; INTRAVENOUS; PARENTERAL at 09:18

## 2017-08-10 NOTE — DISCHARGE INSTRUCTIONS
[address]  Department of Interventional Radiology  [facility]  [phone] Office  [phone] Fax  Implanted Port Discharge Instructions      General Instructions:   A port is like an implanted IV. They are usually ordered for patients who will be getting chemotherapy, but can also be used as an IV for long term antibiotics, large amounts of fluids, and/or blood products. Your blood can be drawn from your port for labs also. Those patients who do not have good veins find the ports convenient as they can get the IV they need with one stick. The port can be used long term, and the care is easy. The device is under the skin, and once the skin heals, care is minimal. All that is required is the nurse who accesses the port will need to flush it with heparinized saline after each use. Ports are usually placed in the chest wall, usually on the right side. But they can be place in the arms and in the abdomen. Home Care Instructions: If your port is in your arm, do not allow blood pressure or other IVs to be place in that arm. Do not allow bra straps or any clothing to rub the skin over the port. Do not bathe or swim until the skin has healed and if the port is accessed. Once it is healed, and when the port is not accessed, it is okay to bathe and swim. Restrict yourself to light activity for the first 5 days after getting the port put in, after that, resume normal activity slowly. You may resume your normal diet and medications. Follow-Up Instructions: Please see your oncologist, or whatever physician ordered the port as he/she has requested of you. Call If: You should call your Physician and/or the Radiology Nurse if you notice redness, pus, swelling, or pain from the area of your incision. Call if you should develop a fever. The nurses who access your port will know to call your doctor if the port does not seem to be working properly. You need to tell the nurses who use the port if you should have any pain or swelling at the site during an infusion. To Reach Us: If you have any questions about your procedure, please call the Interventional Radiology department at [phone]. After business hours (5pm) and weekends, call the answering service at [phone] and ask for the Radiologist on call to be paged. Patient Signature:  Date: 8/10/2017  Discharging Nurse: Chandni Oquendo RN        Interventional Radiology General Nurse Discharge    After general anesthesia or intravenous sedation, for 24 hours or while taking prescription Narcotics:  · Limit your activities  · Do not drive and operate hazardous machinery  · Do not make important personal or business decisions  · Do  not drink alcoholic beverages  · If you have not urinated within 8 hours after discharge, please contact your surgeon on call. * Please give a list of your current medications to your Primary Care Provider. * Please update this list whenever your medications are discontinued, doses are     changed, or new medications (including over-the-counter products) are added. * Please carry medication information at all times in case of emergency situations. These are general instructions for a healthy lifestyle:    No smoking/ No tobacco products/ Avoid exposure to second hand smoke  Surgeon General's Warning:  Quitting smoking now greatly reduces serious risk to your health. Obesity, smoking, and sedentary lifestyle greatly increases your risk for illness  A healthy diet, regular physical exercise & weight monitoring are important for maintaining a healthy lifestyle    You may be retaining fluid if you have a history of heart failure or if you experience any of the following symptoms:  Weight gain of 3 pounds or more overnight or 5 pounds in a week, increased swelling in our hands or feet or shortness of breath while lying flat in bed.   Please call your doctor as soon as you notice any of these symptoms; do not wait until your next office visit. Recognize signs and symptoms of STROKE:  F-face looks uneven    A-arms unable to move or move unevenly    S-speech slurred or non-existent    T-time-call 911 as soon as signs and symptoms begin-DO NOT go       Back to bed or wait to see if you get better-TIME IS BRAIN.

## 2017-08-10 NOTE — PROGRESS NOTES
Recovery period without difficulty. Pt alert and oriented and denies pain. Dressing is clean, dry, and intact. Reviewed discharge instructions with patient and , both verbalized understanding. Pt escorted to lobby discharge area via wheelchair. Vital signs and Buddy score completed.

## 2017-08-10 NOTE — H&P
Department of Interventional Radiology  [phone]    History and Physical    Patient:  Princess Osorio MRN:  [id number]  SSN:  [id number]    YOB: 1945  Age:  73 y.o. Sex:  female      Primary Care Provider:  Riley Jaramillo MD  Referring Physician:  Cathy Harp MD    Subjective:     Chief Complaint: port    History of the Present Illness: The patient is a 73 y.o. female who presents for venous chest port placement. Recurrent squamous cell CA of right lung. Advanced COPD, uses N/c oxygen daily, usually intermittently. Coughing this morning. NPO x Ativan. Used inhalers this morning. Past Medical History:   Diagnosis Date    Acquired hypothyroidism 12/30/2016    Cancer ([facility] [address]     non-hodgkins lymphoma, lung ca     COPD     Hypercholesterolemia     Other ill-defined conditions      hypothyroid    Symptomatic cholelithiasis 12/30/2016    Unspecified sleep apnea     uses o2 at night     Past Surgical History:   Procedure Laterality Date    HX OTHER SURGICAL      removed mass from right elbow        Review of Systems:    Pertinent items are noted in the History of Present Illness. Current Outpatient Prescriptions   Medication Sig    predniSONE (DELTASONE) 10 mg tablet Take 1 Tab by mouth daily (with breakfast).  albuterol (VENTOLIN HFA) 90 mcg/actuation inhaler Take 2 Puffs by inhalation every four (4) hours as needed for Wheezing.  rosuvastatin (CRESTOR) 20 mg tablet Take  by mouth. 1/2 tablet daily    levothyroxine (SYNTHROID) 88 mcg tablet Take  by mouth Daily (before breakfast).  LORazepam (ATIVAN) 1 mg tablet Take 0.5 Tabs by mouth every six (6) hours as needed for Anxiety. Max Daily Amount: 2 mg.  mometasone-formoterol (DULERA) 200-5 mcg/actuation HFA inhaler Take 2 Puffs by inhalation two (2) times a day.  zolpidem (AMBIEN) 10 mg tablet Take 10 mg by mouth nightly as needed.  citalopram (CELEXA) 40 mg tablet Take 40 mg by mouth daily.  albuterol (PROVENTIL VENTOLIN) 2.5 mg /3 mL (0.083 %) nebulizer solution 3 mL by Nebulization route every four (4) hours as needed for Wheezing or Shortness of Breath (cough). COPD, lung Ca, Medicare 4    ondansetron hcl (ZOFRAN) 8 mg tablet Take 1 Tab by mouth every eight (8) hours as needed for Nausea.  guaiFENesin ER (MUCINEX) 600 mg ER tablet Take 600 mg by mouth daily.  OXYGEN-AIR DELIVERY SYSTEMS by Does Not Apply route nightly. 2.5 lpm cont.  aspirin 81 mg CpDR Take 1 Tab by mouth daily.      Current Facility-Administered Medications   Medication Dose Route Frequency    lidocaine (XYLOCAINE) 20 mg/mL (2 %) injection  mg   mg SubCUTAneous Rad Multiple    sodium bicarbonate (4%) (NEUT) injection 1-2 mL  1-2 mL SubCUTAneous ONCE    0.9% sodium chloride infusion  25 mL/hr IntraVENous ONCE    diphenhydrAMINE (BENADRYL) injection 12.5-50 mg  12.5-50 mg IntraVENous ONCE    fentaNYL citrate (PF) injection  mcg   mcg IntraVENous Multiple    midazolam (VERSED) injection 0.25-2 mg  0.25-2 mg IntraVENous Multiple    heparin (PF) 2 units/ml in NS infusion 2,000 Units  1,000 mL Irrigation Multiple    heparin (porcine) 100 unit/mL injection 500 Units  500 Units InterCATHeter ONCE    ceFAZolin in 0.9% NS (ANCEF) IVPB soln 2 g  2 g IntraVENous ONCE    lidocaine-EPINEPHrine (XYLOCAINE) 1.5 %-1:200,000 injection  mg  1-20 mL SubCUTAneous ONCE    meperidine (DEMEROL) injection 25-50 mg  25-50 mg IntraVENous Multiple        Allergies   Allergen Reactions    Iodinated Contrast- Oral And Iv Dye Nausea and Vomiting    Sulfa (Sulfonamide Antibiotics) Other (comments)     Skin burning, nausea vomiting    Zofran [Ondansetron Hcl (Pf)] Nausea and Vomiting       Family History   Problem Relation Age of Onset    Other Mother      lupus, breast ca    Other Father      DM    Other Maternal Aunt      ovarian ca     Social History   Substance Use Topics    Smoking status: Former Smoker     Packs/day: 1.00     Years: 48.00     Types: Cigarettes     Quit date: 8/21/2015    Smokeless tobacco: Never Used    Alcohol use No        Objective:       Physical Examination:    Vitals:    08/10/17 0839   BP: 126/75   Pulse: 76   Temp: 98.4 °F (36.9 °C)   SpO2: 92%     Blood pressure 126/75, pulse 76, temperature 98.4 °F (36.9 °C), SpO2 92 %, not currently breastfeeding.   HEART: regular rate and rhythm  LUNG: clear to auscultation bilaterally  ABDOMEN: normal findings: soft, non-tender  EXTREMITIES: normal strength, tone, and muscle mass, no deformities, no erythema, induration, or nodules    Laboratory:     Lab Results   Component Value Date/Time    Sodium 140 07/25/2017 03:02 PM    Sodium 141 07/07/2017 02:05 PM    Potassium 3.6 07/25/2017 03:02 PM    Potassium 3.6 07/07/2017 02:05 PM    Chloride 104 07/25/2017 03:02 PM    Chloride 103 07/07/2017 02:05 PM    CO2 33 07/25/2017 03:02 PM    CO2 30 07/07/2017 02:05 PM    Anion gap 3 07/25/2017 03:02 PM    Anion gap 8 07/07/2017 02:05 PM    Glucose 90 07/25/2017 03:02 PM    Glucose 88 07/07/2017 02:05 PM    BUN 11 07/25/2017 03:02 PM    BUN 9 07/07/2017 02:05 PM    Creatinine 0.69 07/25/2017 03:02 PM    Creatinine 0.66 07/07/2017 02:05 PM    GFR est AA >60 07/25/2017 03:02 PM    GFR est AA >60 07/07/2017 02:05 PM    GFR est non-AA >60 07/25/2017 03:02 PM    GFR est non-AA >60 07/07/2017 02:05 PM    Calcium 8.7 07/25/2017 03:02 PM    Calcium 8.8 07/07/2017 02:05 PM    Magnesium 2.4 05/30/2017 01:58 PM    Magnesium 2.1 05/11/2017 03:18 PM    Albumin 3.3 07/25/2017 03:02 PM    Albumin 3.6 07/07/2017 02:05 PM    Protein, total 6.5 07/25/2017 03:02 PM    Protein, total 6.9 07/07/2017 02:05 PM    Globulin 3.2 07/25/2017 03:02 PM    Globulin 3.3 07/07/2017 02:05 PM    A-G Ratio 1.0 07/25/2017 03:02 PM    A-G Ratio 1.1 07/07/2017 02:05 PM    AST (SGOT) 30 07/25/2017 03:02 PM    AST (SGOT) 44 07/07/2017 02:05 PM    ALT (SGPT) 34 07/25/2017 03:02 PM    ALT (SGPT) 32 07/07/2017 02:05 PM     Lab Results   Component Value Date/Time    WBC 7.2 07/25/2017 03:02 PM    WBC 5.2 07/07/2017 02:05 PM    HGB 12.8 07/25/2017 03:02 PM    HGB 10.9 07/07/2017 02:05 PM    HCT 39.0 07/25/2017 03:02 PM    HCT 33.5 07/07/2017 02:05 PM    PLATELET 392 33/74/8183 03:02 PM    PLATELET 748 09/01/2972 02:05 PM     Lab Results   Component Value Date/Time    aPTT 20.9 07/25/2017 04:35 PM    aPTT 24.5 08/06/2015 09:25 AM    Prothrombin time 10.4 07/25/2017 04:35 PM    Prothrombin time 10.0 08/06/2015 09:25 AM    INR 1.0 07/25/2017 04:35 PM    INR 0.9 08/06/2015 09:25 AM       Assessment:     Lung cancer    Plan:     Planned Procedure:  Port placement    Risks, benefits, and alternatives reviewed with patient and she agrees to proceed with the procedure.       Signed By: Sherif Tang PA-C     August 10, 2017

## 2017-08-10 NOTE — PROCEDURES
Department of Interventional Radiology  [phone]        Interventional Radiology Brief Procedure Note    Patient: Trudy Laws MRN: [id number]  SSN: [id number]    YOB: 1945  Age: 73 y.o.   Sex: female      Date of Procedure: 8/10/2017    Pre-Procedure Diagnosis: Lung cancer     Post-Procedure Diagnosis: SAME    Procedure(s): Venous Chest Port Placement    Performed By: Rossy Wright MD     Assistants: None    Anesthesia:Moderate Sedation    Estimated Blood Loss: Less than 10ml    Specimens: None    Implants: Subcutaneous Port    Findings: Patent right IJV     Complications: None    Follow Up: sutures out in 10 - 14 days    Signed By: Rossy Wright MD     August 10, 2017

## 2017-08-10 NOTE — PROGRESS NOTES
TRANSFER - OUT REPORT:    Verbal report given to JP Story (name) on Cherl Cartwright  being transferred to IR Recovery (unit) for routine progression of care       Report consisted of patients Situation, Background, Assessment and   Recommendations(SBAR). Information from the following report(s) Procedure Summary, Intake/Output and MAR was reviewed with the receiving nurse. Opportunity for questions and clarification was provided. Conscious Sedation:   175 Mcg of Fentanyl administered  3.5 Mg of Versed administered    Pt tolerated procedure well.      Visit Vitals    /75    Pulse 76    Temp 98.4 °F (36.9 °C)    Resp 16    SpO2 91%    Breastfeeding No     Past Medical History:   Diagnosis Date    Acquired hypothyroidism 12/30/2016    Cancer ([facility] [address]     non-hodgkins lymphoma, lung ca     COPD     Hypercholesterolemia     Other ill-defined conditions      hypothyroid    Symptomatic cholelithiasis 12/30/2016    Unspecified sleep apnea     uses o2 at night     Peripheral IV 08/10/17 Left Hand (Active)              Venous Access Device [employer] Power Port 08/10/17 Upper chest (subclavicular area, right (Active)

## 2017-08-10 NOTE — PROGRESS NOTES
TRANSFER - IN REPORT:    Verbal report received from Cherelle RN(name) on Sandee Chin  being received from IR(unit) for routine progression of care      Report consisted of patients Situation, Background, Assessment and   Recommendations(SBAR). Information from the following report(s) Procedure Summary and MAR was reviewed with the receiving nurse. Opportunity for questions and clarification was provided. Assessment completed upon patients arrival to unit and care assumed.

## 2017-08-10 NOTE — PROGRESS NOTES
IR Nurse Pre-Procedure Checklist Part 2          Consent signed: Yes    H&P complete:  Yes    Antibiotics: Yes    Airway/Mallampati Done: Yes    Shaved: Not applicable    Pregnancy Form:Not applicable    Patient Position: Yes    MD Side: Yes     Biopsy Worksheet: Not applicable    Specimen Medium: Not applicable

## 2017-08-10 NOTE — IP AVS SNAPSHOT
[address] 
[phone] Patient: Will Farooq MRN: [id number] :1945 You are allergic to the following Allergen Reactions Iodinated Contrast- Oral And Iv Dye Nausea and Vomiting  
    
 Sulfa (Sulfonamide Antibiotics) Other (comments) Skin burning, nausea vomiting Zofran (Ondansetron Hcl (Pf)) Nausea and Vomiting Recent Documentation Breastfeeding? OB Status Smoking Status No Postmenopausal Former Smoker Emergency Contacts Name Discharge Info Relation Home Work Mobile MoyHarley DISCHARGE CAREGIVER [3] Spouse [3] [phone] About your hospitalization You were admitted on:  August 10, 2017 You last received care in the:  [facility] Radiology Specials You were discharged on:  August 10, 2017 Unit phone number:  [phone] Why you were hospitalized Your primary diagnosis was:  Not on File Providers Seen During Your Hospitalizations Provider Role Specialty Primary office phone Cherie Rosas MD Attending Provider Hematology [phone] Your Primary Care Physician (PCP) Primary Care Physician Office Phone Office Fax Jesus Mccray [phone] Follow-up Information None Your Appointments Tuesday August 15, 2017 12:15 PM EDT  
LAB with [address]  
[address] [place] [address] OUTREACH INSURANCE [address]) Andres Teague [address] [place] [address]  
[phone] Tuesday August 15, 2017 12:45 PM EDT PreChemo Follow Up with EMELY WREN NP1 Trista Smith Hematology and Oncology [facility]) JAGDISH/ Roland Cook [address] [facility] [place] [address]  
[phone] Tuesday August 15, 2017  1:15 PM EDT Chemo with Jolie Lopez. [address] [facility] ([address]) [address] Tamiko Aguilar [phone] [facility] [address]  
[phone] [address] Tuesday August 22, 2017 12:15 PM EDT  
LAB with [address]  
[address] [facility] OUTREACH INSURANCE [address]) Andres Reison [address] [facility]  
[phone] Tuesday August 22, 2017 12:45 PM EDT PreChemo Follow Up with MD Alessandro Westbrook Hematology and Oncology [facility]) C/ Roland Cook 33 [facility] [address]  
[phone] Tuesday August 22, 2017  1:15 PM EDT Chemo with Juan Sander. [address] ([address]) [address] Baldemar Daigle [phone] [facility] [address]  
[phone] [address] Tuesday August 29, 2017  1:00 PM EDT  
LAB with [address]  
[address] [facility] OUTREACH INSURANCE ([address]) Andres Teague [address] [facility]  
[phone] Tuesday August 29, 2017  1:30 PM EDT PreChemo Follow Up with MD [facility]) JAGDISH/ Roland Cook 33 [facility] [address]  
[phone] Tuesday August 29, 2017  2:00 PM EDT Chemo with NUR3  
ST. [address] ([address]) [address] Baldemar Daigle [phone] [facility] [address]  
[phone] [address] Current Discharge Medication List  
  
ASK your doctor about these medications Dose & Instructions Dispensing Information Comments Morning Noon Evening Bedtime AMBIEN 10 mg tablet Generic drug:  zolpidem Your last dose was: Your next dose is:    
   
   
 Dose:  10 mg Take 10 mg by mouth nightly as needed. Refills:  0  
     
   
   
   
  
 aspirin 81 mg Cpdr  
   
Your last dose was: Your next dose is: Dose:  1 Tab Take 1 Tab by mouth daily. Refills:  0 CeleXA 40 mg tablet Generic drug:  citalopram  
   
Your last dose was: Your next dose is:    
   
   
 Dose:  40 mg Take 40 mg by mouth daily. Refills:  0  
     
   
   
   
  
 CRESTOR 20 mg tablet Generic drug:  rosuvastatin Your last dose was: Your next dose is: Take  by mouth. 1/2 tablet daily Refills:  0 DULERA 200-5 mcg/actuation HFA inhaler Generic drug:  mometasone-formoterol Your last dose was: Your next dose is:    
   
   
 Dose:  2 Puff Take 2 Puffs by inhalation two (2) times a day. Refills:  0 LORazepam 1 mg tablet Commonly known as:  ATIVAN Your last dose was: Your next dose is:    
   
   
 Dose:  0.5 mg Take 0.5 Tabs by mouth every six (6) hours as needed for Anxiety. Max Daily Amount: 2 mg. Quantity:  30 Tab Refills:  0 MUCINEX 600 mg ER tablet Generic drug:  guaiFENesin ER Your last dose was: Your next dose is:    
   
   
 Dose:  600 mg Take 600 mg by mouth daily. Refills:  0  
     
   
   
   
  
 ondansetron hcl 8 mg tablet Commonly known as:  Delphireymundo Brod Your last dose was: Your next dose is:    
   
   
 Dose:  8 mg Take 1 Tab by mouth every eight (8) hours as needed for Nausea. Quantity:  90 Tab Refills:  3 OXYGEN-AIR DELIVERY SYSTEMS Your last dose was: Your next dose is:    
   
   
 by Does Not Apply route nightly. 2.5 lpm cont. Refills:  0  
     
   
   
   
  
 predniSONE 10 mg tablet Commonly known as:  Grabiel Katz Your last dose was: Your next dose is:    
   
   
 Dose:  10 mg Take 1 Tab by mouth daily (with breakfast). Quantity:  30 Tab Refills:  2 SYNTHROID 88 mcg tablet Generic drug:  levothyroxine Your last dose was: Your next dose is: Take  by mouth Daily (before breakfast). Refills:  0  
     
   
   
   
  
 * VENTOLIN HFA 90 mcg/actuation inhaler Generic drug:  albuterol Your last dose was: Your next dose is:    
   
   
 Dose:  2 Puff Take 2 Puffs by inhalation every four (4) hours as needed for Wheezing. Refills:  0  
     
   
   
   
  
 * albuterol 2.5 mg /3 mL (0.083 %) nebulizer solution Commonly known as:  PROVENTIL VENTOLIN Your last dose was: Your next dose is:    
   
   
 Dose:  2.5 mg  
3 mL by Nebulization route every four (4) hours as needed for Wheezing or Shortness of Breath (cough). COPD, lung Ca, Medicare 4 Quantity:  120 Each Refills:  11 * Notice: This list has 2 medication(s) that are the same as other medications prescribed for you. Read the directions carefully, and ask your doctor or other care provider to review them with you. Discharge Instructions [facility] [address] Department of Interventional Radiology [address] [place] [address] [facility] 
[phone] Office 
[phone] Fax Implanted [facility] Discharge Instructions General Instructions: 
 A port is like an implanted IV. They are usually ordered for patients who will be getting chemotherapy, but can also be used as an IV for long term antibiotics, large amounts of fluids, and/or blood products. Your blood can be drawn from your port for labs also. Those patients who do not have good veins find the ports convenient as they can get the IV they need with one stick. The port can be used long term, and the care is easy. The device is under the skin, and once the skin heals, care is minimal. All that is required is the nurse who accesses the port will need to flush it with heparinized saline after each use.  Ports are usually placed in the chest wall, usually on the right side. But they can be place in the arms and in the abdomen. Home Care Instructions: If your port is in your arm, do not allow blood pressure or other IVs to be place in that arm. Do not allow bra straps or any clothing to rub the skin over the port. Do not bathe or swim until the skin has healed and if the port is accessed. Once it is healed, and when the port is not accessed, it is okay to bathe and swim. Restrict yourself to light activity for the first 5 days after getting the port put in, after that, resume normal activity slowly. You may resume your normal diet and medications. Follow-Up Instructions: Please see your oncologist, or whatever physician ordered the port as he/she has requested of you. Call If: You should call your Physician and/or the Radiology Nurse if you notice redness, pus, swelling, or pain from the area of your incision. Call if you should develop a fever. The nurses who access your port will know to call your doctor if the port does not seem to be working properly. You need to tell the nurses who use the port if you should have any pain or swelling at the site during an infusion. To Reach Us: If you have any questions about your procedure, please call the Interventional Radiology department at [phone]. After business hours (5pm) and weekends, call the answering service at [phone] and ask for the Radiologist on call to be paged. Patient Signature: 
Date: 8/10/2017 Discharging Nurse: Chandni Oquendo RN Interventional Radiology General Nurse Discharge After general anesthesia or intravenous sedation, for 24 hours or while taking prescription Narcotics: · Limit your activities · Do not drive and operate hazardous machinery · Do not make important personal or business decisions · Do  not drink alcoholic beverages · If you have not urinated within 8 hours after discharge, please contact your surgeon on call. * Please give a list of your current medications to your Primary Care Provider. * Please update this list whenever your medications are discontinued, doses are 
   changed, or new medications (including over-the-counter products) are added. * Please carry medication information at all times in case of emergency situations. These are general instructions for a healthy lifestyle: No smoking/ No tobacco products/ Avoid exposure to second hand smoke Surgeon General's Warning:  Quitting smoking now greatly reduces serious risk to your health. Obesity, smoking, and sedentary lifestyle greatly increases your risk for illness A healthy diet, regular physical exercise & weight monitoring are important for maintaining a healthy lifestyle You may be retaining fluid if you have a history of heart failure or if you experience any of the following symptoms:  Weight gain of 3 pounds or more overnight or 5 pounds in a week, increased swelling in our hands or feet or shortness of breath while lying flat in bed. Please call your doctor as soon as you notice any of these symptoms; do not wait until your next office visit. Recognize signs and symptoms of STROKE: 
F-face looks uneven A-arms unable to move or move unevenly S-speech slurred or non-existent T-time-call 911 as soon as signs and symptoms begin-DO NOT go Back to bed or wait to see if you get better-TIME IS BRAIN. Discharge Orders None Introducing [facility] & HE[facility]! Dear Joan Lezama: Thank you for requesting a Valmarc account. Our records indicate that you already have an active Valmarc account. You can access your account anytime at [email or web address] Did you know that you can access your hospital and ER discharge instructions at any time in Valmarc? You can also review all of your test results from your hospital stay or ER visit. Additional Information If you have questions, please visit the Frequently Asked Questions section of the MyChart website at [email or web address] Remember, MyChart is NOT to be used for urgent needs. For medical emergencies, dial 911. Now available from your iPhone and Android! General Information Please provide this summary of care documentation to your next provider. Patient Signature:  ____________________________________________________________ Date:  ____________________________________________________________  
  
Lucina Pane Provider Signature:  ____________________________________________________________ Date:  ____________________________________________________________

## 2017-08-15 NOTE — PROGRESS NOTES
Arrived to the [facility]. C1D1 completed. Patient tolerated well. Any issues or concerns during appointment: no.  Patient aware of next infusion appointment on 8/22/17 (date) at 80 (time). Discharged ambulatory.

## 2017-08-22 NOTE — PROGRESS NOTES
Arrived to the [facility].  Sutures removed from right port site-incision healing well  Any issues or concerns during appointment:Platelet count [id number] held per   Patient to be rescheduled for [facility] next week-Amy Villa RN to call patient with appointment  Discharged home ambulatory

## 2017-08-29 NOTE — PROGRESS NOTES
Problem: Chemotherapy Treatment  Goal: *Chemotherapy regimen followed  Outcome: Progressing Towards Goal  Reviewed Gemzar infusion with patient. Verbalizes understanding.

## 2017-08-29 NOTE — PROGRESS NOTES
Tolerated chemotherapy without difficulty. Patient discharged via ambulation accompanied by self. Instructed to notify physician of any problems, questions or concerns. Allowed opportunity for patient/family to ask questions. Verbalized understanding. Next appointment is 09/12/17 at [phone] with Gregorio.

## 2017-09-12 NOTE — PROGRESS NOTES
Arrived to the [facility]. Infusion completed. Patient tolerated well. Any issues or concerns during appointment: none.   Patient aware of next infusion appointment on 9/26/17 @ 1500  Discharged ambulatory

## 2017-09-26 NOTE — PROGRESS NOTES
Massage THERAPY: Daily Note    Referring Physician: Dale Salomon MD  Medical/Referring Diagnosis: Malignant neoplasm of unspecified part of right bronchus or lung [C34.91]   Precautions/Allergies: Iodinated contrast- oral and iv dye; Sulfa (sulfonamide antibiotics); and Zofran [ondansetron hcl (pf)]  SUBJECTIVE:  Present Symptoms: patient here to receive chemotherapy treatment, stated no concerns to me     Pre-Treatment Pain: 0/10  Past Medical History:    Ms. Juansi Nova  has a past medical history of Acquired hypothyroidism (12/30/2016); Cancer ([facility] COPD; Hypercholesterolemia; Other ill-defined conditions; Symptomatic cholelithiasis (12/30/2016); and Unspecified sleep apnea. She also has no past medical history of Arthritis; Asthma; Autoimmune disease ([facility] CAD (coronary artery disease); Chronic kidney disease; DEMENTIA; Diabetes ([facility] Heart failure ([facility] Hypertension; Infectious disease; Liver disease; Neurological disorder; Psychiatric disorder; PUD (peptic ulcer disease); Seizures ([facility] Sleep disorder; Stroke [facility]); or Thromboembolus ([facility] Ms. Juanis Nova  has a past surgical history that includes other surgical.  Current Medications:       Current Outpatient Prescriptions:     predniSONE (DELTASONE) 50 mg tablet, Take 1 tab 13 hours prior to procedure, Take 1 tab 7 hours prior to procedure, Take 1 tab 1 hour prior to procedure, Disp: 3 Tab, Rfl: 0    lidocaine-prilocaine (EMLA) topical cream, Apply  to affected area as needed. Apply to port site 30 minutes to 1 hour prior to port access, Disp: 30 g, Rfl: 0    albuterol (PROVENTIL VENTOLIN) 2.5 mg /3 mL (0.083 %) nebulizer solution, 3 mL by Nebulization route every four (4) hours as needed for Wheezing or Shortness of Breath (cough). COPD, lung Ca, Medicare 4, Disp: 120 Each, Rfl: 11    predniSONE (DELTASONE) 10 mg tablet, Take 1 Tab by mouth daily (with breakfast). , Disp: 30 Tab, Rfl: 2    albuterol (VENTOLIN HFA) 90 mcg/actuation inhaler, Take 2 Puffs by inhalation every four (4) hours as needed for Wheezing., Disp: , Rfl:     ondansetron hcl (ZOFRAN) 8 mg tablet, Take 1 Tab by mouth every eight (8) hours as needed for Nausea., Disp: 90 Tab, Rfl: 3    rosuvastatin (CRESTOR) 20 mg tablet, Take  by mouth. 1/2 tablet daily, Disp: , Rfl:     levothyroxine (SYNTHROID) 88 mcg tablet, Take  by mouth Daily (before breakfast). , Disp: , Rfl:     guaiFENesin ER (MUCINEX) 600 mg ER tablet, Take 600 mg by mouth daily. , Disp: , Rfl:     OXYGEN-AIR DELIVERY SYSTEMS, by Does Not Apply route nightly. 3 lpm cont., Disp: , Rfl:     LORazepam (ATIVAN) 1 mg tablet, Take 0.5 Tabs by mouth every six (6) hours as needed for Anxiety. Max Daily Amount: 2 mg., Disp: 30 Tab, Rfl: 0    mometasone-formoterol (DULERA) 200-5 mcg/actuation HFA inhaler, Take 2 Puffs by inhalation two (2) times a day., Disp: , Rfl:     aspirin 81 mg CpDR, Take 1 Tab by mouth daily. , Disp: , Rfl:     zolpidem (AMBIEN) 10 mg tablet, Take 10 mg by mouth nightly as needed. , Disp: , Rfl:     citalopram (CELEXA) 40 mg tablet, Take 40 mg by mouth daily. , Disp: , Rfl:     Current Facility-Administered Medications:     gemcitabine (GEMZAR) 1,008 mg in 0.9% sodium chloride 250 mL chemo infusion, 600 mg/m2 (Treatment Plan Recorded), IntraVENous, ONCE, Marlene Russell MD, 1,008 mg at 09/26/17 1531    saline peripheral flush soln 10 mL, 10 mL, InterCATHeter, PRN, Marlene Russell MD, 10 mL at 09/26/17 1525    heparin (porcine) pf 300-500 Units, 300-500 Units, InterCATHeter, PRN, Marlene Russell MD       OBJECTIVE/ASSESSMENT:  Objective Measure: Tool Used: Subjective Units of Distress Scale (SUDS)  Score:  Pre-Treatment: /100 Post-Treatment: /100   Interpretation of Score: Rating of patient's distress, fear, anxiety or discomfort on a scale of 0-100.    Observations of Patient:  pateint appears alert, talkative, friend in room with her  Response To Treatment: \"GREAT\" relaxed and appreciative of service   Post-Treatment Pain: 0/10  TREATMENT:    (In addition to Assessment/Re-Assessment sessions the following treatments were rendered)  Treatment Provided:  [x]  Soft tissue massage  []  Healing Touch   Location: bilateral feet  Patient Position: seated  Time: 15 minutes    PLAN OF CARE:    [x]  I will follow up with this patient as needed. []  No follow up visit necessary.     Thank you for this referral.  Carolyn Howell

## 2017-09-26 NOTE — PROGRESS NOTES
Arrived to the [facility]. Infusion completed. Patient tolerated well. Any issues or concerns during appointment: none.   Patient aware of next infusion appointment on 10/12/17 @ 1400  Discharged ambulatory

## 2017-10-12 NOTE — PROGRESS NOTES
Massage THERAPY: Daily Note    Referring Physician: Mack Leal MD  Medical/Referring Diagnosis: Malignant neoplasm of unspecified part of right bronchus or lung [C34.91]   Precautions/Allergies: Iodinated contrast- oral and iv dye; Sulfa (sulfonamide antibiotics); and Zofran [ondansetron hcl (pf)]  SUBJECTIVE:  Present Symptoms: none, likes massage     Pre-Treatment Pain: 1/10  Past Medical History:    Ms. Monique Haider  has a past medical history of Acquired hypothyroidism (12/30/2016); Cancer ([facility] [address] COPD; Hypercholesterolemia; Other ill-defined conditions(799.89); Symptomatic cholelithiasis (12/30/2016); and Unspecified sleep apnea. She also has no past medical history of Arthritis; Asthma; Autoimmune disease ([facility] CAD (coronary artery disease); Chronic kidney disease; DEMENTIA; Diabetes ([facility] [address] [facility] Heart failure ([facility] Hypertension; Infectious disease; Liver disease; Neurological disorder; Psychiatric disorder; PUD (peptic ulcer disease); Seizures ([facility] Sleep disorder; Stroke [facility]); or [facility]emb[facility]us ([facility] Ms. Monique Haider  has a past surgical history that includes other surgical.  Current Medications:       Current Outpatient Prescriptions:     mirtazapine (REMERON) 15 mg tablet, Take 1 Tab by mouth nightly., Disp: 30 Tab, Rfl: 2    predniSONE (DELTASONE) 50 mg tablet, Take 1 tab 13 hours prior to procedure, Take 1 tab 7 hours prior to procedure, Take 1 tab 1 hour prior to procedure, Disp: 3 Tab, Rfl: 0    lidocaine-prilocaine (EMLA) topical cream, Apply  to affected area as needed. Apply to port site 30 minutes to 1 hour prior to port access, Disp: 30 g, Rfl: 0    albuterol (PROVENTIL VENTOLIN) 2.5 mg /3 mL (0.083 %) nebulizer solution, 3 mL by Nebulization route every four (4) hours as needed for Wheezing or Shortness of Breath (cough). COPD, lung Ca, Medicare 4, Disp: 120 Each, Rfl: 11    predniSONE (DELTASONE) 10 mg tablet, Take 1 Tab by mouth daily (with breakfast). , Disp: 30 Tab, Rfl: 2    albuterol (VENTOLIN HFA) 90 mcg/actuation inhaler, Take 2 Puffs by inhalation every four (4) hours as needed for Wheezing., Disp: , Rfl:     ondansetron hcl (ZOFRAN) 8 mg tablet, Take 1 Tab by mouth every eight (8) hours as needed for Nausea., Disp: 90 Tab, Rfl: 3    rosuvastatin (CRESTOR) 20 mg tablet, Take  by mouth. 1/2 tablet daily, Disp: , Rfl:     levothyroxine (SYNTHROID) 88 mcg tablet, Take  by mouth Daily (before breakfast). , Disp: , Rfl:     guaiFENesin ER (MUCINEX) 600 mg ER tablet, Take 600 mg by mouth daily. , Disp: , Rfl:     OXYGEN-AIR DELIVERY SYSTEMS, by Does Not Apply route nightly. 3 lpm cont., Disp: , Rfl:     LORazepam (ATIVAN) 1 mg tablet, Take 0.5 Tabs by mouth every six (6) hours as needed for Anxiety. Max Daily Amount: 2 mg., Disp: 30 Tab, Rfl: 0    mometasone-formoterol (DULERA) 200-5 mcg/actuation HFA inhaler, Take 2 Puffs by inhalation two (2) times a day., Disp: , Rfl:     aspirin 81 mg CpDR, Take 1 Tab by mouth daily. , Disp: , Rfl:     zolpidem (AMBIEN) 10 mg tablet, Take 10 mg by mouth nightly as needed. , Disp: , Rfl:     citalopram (CELEXA) 40 mg tablet, Take 40 mg by mouth daily. , Disp: , Rfl:     Current Facility-Administered Medications:     pembrolizumab (KEYTRUDA) 200 mg in 0.9% sodium chloride 100 mL IVPB, 200 mg, IntraVENous, ONCE, Emilia Macias MD    saline peripheral flush soln 10 mL, 10 mL, InterCATHeter, PRN, Emilia Macias MD, 10 mL at 10/12/17 1513    heparin (porcine) pf 300-500 Units, 300-500 Units, InterCATHeter, PRN, Emilia Macias MD       OBJECTIVE/ASSESSMENT:  Objective Measure: Tool Used: Subjective Units of Distress Scale (SUDS)  Score:  Pre-Treatment: 0/100 Post-Treatment: 0/100   Interpretation of Score: Rating of patient's distress, fear, anxiety or discomfort on a scale of 0-100.    Observations of Patient:  Enjoying massage  Response To Treatment: More relaxed   Post-Treatment Pain: 1/10  TREATMENT:    (In addition to Assessment/Re-Assessment sessions the following treatments were rendered)  Treatment Provided:  [x]  Soft tissue massage  []  Healing Touch   Location: bilateral lower legs and feet  Patient Position: seated  Time: 20 minutes    PLAN OF CARE:    []  I will follow up with this patient as needed. [x]  No follow up visit necessary.     Thank you for this referral.  Juli López

## 2017-10-12 NOTE — PROGRESS NOTES
Arrived to the [facility]. Chemotherapy completed. Patient tolerated well. Any issues or concerns during appointment: none. Patient aware of next infusion appointment on 11/02  at 1315 . Discharged ambulatory.

## 2017-11-02 NOTE — PROGRESS NOTES
Arrived to infusion  No concerns noted  Office notified K 3.1,NP called in   Po potassium order for home  Explained to patient  Tolerated keytruda well

## 2017-11-21 NOTE — PROGRESS NOTES
Arrived to the [facility]. Chemo completed. Patient tolerated well. Any issues or concerns during appointment: none. Patient aware of next infusion appointment on 12/12/17 at 1400. Discharged ambulatory.

## 2017-11-21 NOTE — PROGRESS NOTES
Massage THERAPY: Daily Note    Referring Physician: Saji Ryena MD  Medical/Referring Diagnosis: Malignant neoplasm of unspecified part of right bronchus or lung [C34.91]   Precautions/Allergies: Iodinated contrast- oral and iv dye; Sulfa (sulfonamide antibiotics); and Zofran [ondansetron hcl (pf)]  SUBJECTIVE:  Present Symptoms: none presented for massage therapy     Pre-Treatment Pain: 0/10  Past Medical History:    Ms. Alex Andrews  has a past medical history of Acquired hypothyroidism (12/30/2016); Cancer ([facility] [address] COPD; Hypercholesterolemia; Other ill-defined conditions(799.89); Symptomatic cholelithiasis (12/30/2016); and Unspecified sleep apnea. She also has no past medical history of Arthritis; Asthma; Autoimmune disease ([facility] [address] CAD (coronary artery disease); Chronic kidney disease; DEMENTIA; Diabetes ([facility] [address] Heart failure ([facility] [address] Hypertension; Infectious disease; Liver disease; Neurological disorder; Psychiatric disorder; PUD (peptic ulcer disease); Seizures ([facility] [address] Sleep disorder; Stroke [facility]); or [facility] Ms. Alex Andrews  has a past surgical history that includes other surgical.  Current Medications:       Current Outpatient Prescriptions:     predniSONE (DELTASONE) 50 mg tablet, Take 1 tab 13 hours prior to procedure, Take 1 tab 7 hours prior to procedure, Take 1 tab 1 hour prior to procedure, Disp: 3 Tab, Rfl: 0    predniSONE (DELTASONE) 10 mg tablet, Take 1 Tab by mouth daily (with breakfast). , Disp: 30 Tab, Rfl: 2    omeprazole (PRILOSEC) 40 mg capsule, Take 1 Cap by mouth daily. , Disp: 30 Cap, Rfl: 2    mirtazapine (REMERON) 15 mg tablet, Take 1 Tab by mouth nightly., Disp: 30 Tab, Rfl: 2    lidocaine-prilocaine (EMLA) topical cream, Apply  to affected area as needed.  Apply to port site 30 minutes to 1 hour prior to port access, Disp: 30 g, Rfl: 0    albuterol (PROVENTIL VENTOLIN) 2.5 mg /3 mL (0.083 %) nebulizer solution, 3 mL by Nebulization route every four (4) hours as needed for Wheezing or Shortness of Breath (cough). COPD, lung Ca, Medicare 4, Disp: 120 Each, Rfl: 11    albuterol (VENTOLIN HFA) 90 mcg/actuation inhaler, Take 2 Puffs by inhalation every four (4) hours as needed for Wheezing., Disp: , Rfl:     ondansetron hcl (ZOFRAN) 8 mg tablet, Take 1 Tab by mouth every eight (8) hours as needed for Nausea., Disp: 90 Tab, Rfl: 3    rosuvastatin (CRESTOR) 20 mg tablet, Take  by mouth. 1/2 tablet daily, Disp: , Rfl:     levothyroxine (SYNTHROID) 88 mcg tablet, Take  by mouth Daily (before breakfast). , Disp: , Rfl:     guaiFENesin ER (MUCINEX) 600 mg ER tablet, Take 600 mg by mouth daily. , Disp: , Rfl:     OXYGEN-AIR DELIVERY SYSTEMS, by Does Not Apply route nightly. 3 lpm cont., Disp: , Rfl:     LORazepam (ATIVAN) 1 mg tablet, Take 0.5 Tabs by mouth every six (6) hours as needed for Anxiety. Max Daily Amount: 2 mg., Disp: 30 Tab, Rfl: 0    mometasone-formoterol (DULERA) 200-5 mcg/actuation HFA inhaler, Take 2 Puffs by inhalation two (2) times a day., Disp: , Rfl:     aspirin 81 mg CpDR, Take 1 Tab by mouth daily. , Disp: , Rfl:     zolpidem (AMBIEN) 10 mg tablet, Take 10 mg by mouth nightly as needed. , Disp: , Rfl:     citalopram (CELEXA) 40 mg tablet, Take 40 mg by mouth daily. , Disp: , Rfl:     Current Facility-Administered Medications:     pembrolizumab (KEYTRUDA) 200 mg in 0.9% sodium chloride 100 mL IVPB, 200 mg, IntraVENous, ONCE, Beatriz Munoz NP, 200 mg at 11/21/17 1530    saline peripheral flush soln 10 mL, 10 mL, InterCATHeter, PRN, Beatriz Munoz NP, 10 mL at 11/21/17 1502    heparin (porcine) pf 300-500 Units, 300-500 Units, InterCATHeter, PRN, Beatriz Munoz NP       OBJECTIVE/ASSESSMENT:  Objective Measure: Tool Used: Subjective Units of Distress Scale (SUDS)  Score:  Pre-Treatment: /100 Post-Treatment: /100   Interpretation of Score: Rating of patient's distress, fear, anxiety or discomfort on a scale of 0-100.    Observations of Patient:  Patient appears alert and talkative  Response To Treatment: relaxed and appreciative of service   Post-Treatment Pain: 0/10  TREATMENT:    (In addition to Assessment/Re-Assessment sessions the following treatments were rendered)  Treatment Provided:  []  Soft tissue massage  []  Healing Touch   Location: bilateral  feet  Patient Position: seated  Time: 20 minutes    PLAN OF CARE:    []  I will follow up with this patient as needed. []  No follow up visit necessary.     Thank you for this referral.  Devendra Serna

## 2017-12-12 PROBLEM — E87.6 HYPOKALEMIA: Status: ACTIVE | Noted: 2017-01-01

## 2017-12-12 NOTE — PROGRESS NOTES
Pt arrived ambulatory to [facility] with port previously accessed with dressing dry and intact and with good blood return. NS infusing. Potassium infused over 2 hours. Pre meds given iv as ordered. Keytruda infused over 30 minutes. Port flushed and packed with heparin and de accessed. Pt aware of next appt on 12/12/17 at 1400.  Pt discharged ambulatory

## 2018-01-01 ENCOUNTER — HOSPITAL ENCOUNTER (OUTPATIENT)
Dept: INFUSION THERAPY | Age: 73
Discharge: HOME | End: 2018-03-09

## 2018-01-01 ENCOUNTER — HOME CARE VISIT (OUTPATIENT)
Dept: SCHEDULING | Facility: HOME HEALTH | Age: 73
End: 2018-01-01
Payer: MEDICARE

## 2018-01-01 ENCOUNTER — PATIENT OUTREACH (OUTPATIENT)
Dept: CASE MANAGEMENT | Age: 73
End: 2018-01-01

## 2018-01-01 ENCOUNTER — HOSPITAL ENCOUNTER (EMERGENCY)
Age: 73
Discharge: HOME | End: 2018-06-30
Payer: MEDICARE

## 2018-01-01 ENCOUNTER — HOME CARE VISIT (OUTPATIENT)
Dept: HOSPICE | Facility: HOSPICE | Age: 73
End: 2018-01-01
Payer: MEDICARE

## 2018-01-01 ENCOUNTER — HOSPITAL ENCOUNTER (OUTPATIENT)
Dept: LAB | Age: 73
Discharge: HOME | End: 2018-03-09
Payer: MEDICARE

## 2018-01-01 ENCOUNTER — HOSPITAL ENCOUNTER (OUTPATIENT)
Dept: LAB | Age: 73
Discharge: HOME | End: 2018-01-02
Payer: MEDICARE

## 2018-01-01 ENCOUNTER — HOSPITAL ENCOUNTER (OUTPATIENT)
Dept: LAB | Age: 73
Discharge: HOME | End: 2018-02-13
Payer: MEDICARE

## 2018-01-01 ENCOUNTER — HOSPITAL ENCOUNTER (INPATIENT)
Age: 73
LOS: 4 days | Discharge: HOME HEALTH | DRG: 871 | End: 2018-05-20
Attending: EMERGENCY MEDICINE | Admitting: INTERNAL MEDICINE
Payer: MEDICARE

## 2018-01-01 ENCOUNTER — HOSPITAL ENCOUNTER (OUTPATIENT)
Dept: RADIATION ONCOLOGY | Age: 73
Discharge: HOME | End: 2018-03-21
Payer: MEDICARE

## 2018-01-01 ENCOUNTER — HOSPITAL ENCOUNTER (OUTPATIENT)
Dept: INFUSION THERAPY | Age: 73
Discharge: HOME | End: 2018-01-02
Payer: MEDICARE

## 2018-01-01 ENCOUNTER — HOSPITAL ENCOUNTER (OUTPATIENT)
Dept: RADIATION ONCOLOGY | Age: 73
End: 2018-01-01

## 2018-01-01 ENCOUNTER — HOSPITAL ENCOUNTER (EMERGENCY)
Age: 73
Discharge: HOME | End: 2018-07-30
Payer: OTHER MISCELLANEOUS

## 2018-01-01 ENCOUNTER — HOSPITAL ENCOUNTER (OUTPATIENT)
Dept: INFUSION THERAPY | Age: 73
Discharge: HOME | End: 2018-02-13
Payer: MEDICARE

## 2018-01-01 ENCOUNTER — HOSPITAL ENCOUNTER (OUTPATIENT)
Dept: GENERAL RADIOLOGY | Age: 73
Discharge: HOME | End: 2018-04-25
Payer: MEDICARE

## 2018-01-01 ENCOUNTER — HOSPITAL ENCOUNTER (OUTPATIENT)
Dept: LAB | Age: 73
Discharge: HOME | End: 2018-05-29
Payer: MEDICARE

## 2018-01-01 ENCOUNTER — HOSPICE ADMISSION (OUTPATIENT)
Dept: HOSPICE | Facility: HOSPICE | Age: 73
End: 2018-01-01
Payer: MEDICARE

## 2018-01-01 ENCOUNTER — APPOINTMENT (OUTPATIENT)
Dept: GENERAL RADIOLOGY | Age: 73
End: 2018-01-01
Payer: MEDICARE

## 2018-01-01 ENCOUNTER — HOSPITAL ENCOUNTER (INPATIENT)
Age: 73
LOS: 5 days | End: 2018-08-11
Attending: INTERNAL MEDICINE | Admitting: INTERNAL MEDICINE

## 2018-01-01 ENCOUNTER — HOSPITAL ENCOUNTER (OUTPATIENT)
Dept: GENERAL RADIOLOGY | Age: 73
Discharge: HOME | End: 2018-03-16
Attending: INTERNAL MEDICINE
Payer: MEDICARE

## 2018-01-01 ENCOUNTER — APPOINTMENT (OUTPATIENT)
Dept: GENERAL RADIOLOGY | Age: 73
DRG: 871 | End: 2018-01-01
Attending: EMERGENCY MEDICINE
Payer: MEDICARE

## 2018-01-01 ENCOUNTER — HOSPITAL ENCOUNTER (OUTPATIENT)
Dept: GENERAL RADIOLOGY | Age: 73
Discharge: HOME | End: 2018-01-25
Attending: NURSE PRACTITIONER
Payer: MEDICARE

## 2018-01-01 ENCOUNTER — HOSPITAL ENCOUNTER (OUTPATIENT)
Dept: CT IMAGING | Age: 73
Discharge: HOME | End: 2018-03-05
Attending: INTERNAL MEDICINE
Payer: MEDICARE

## 2018-01-01 ENCOUNTER — HOSPITAL ENCOUNTER (OUTPATIENT)
Dept: CT IMAGING | Age: 73
Discharge: HOME | End: 2018-02-28
Attending: INTERNAL MEDICINE
Payer: MEDICARE

## 2018-01-01 ENCOUNTER — HOSPITAL ENCOUNTER (OUTPATIENT)
Dept: INFUSION THERAPY | Age: 73
Discharge: HOME | End: 2018-01-23
Payer: MEDICARE

## 2018-01-01 ENCOUNTER — HOME CARE VISIT (OUTPATIENT)
Dept: HOME HEALTH SERVICES | Facility: HOME HEALTH | Age: 73
End: 2018-01-01

## 2018-01-01 ENCOUNTER — HOME HEALTH ADMISSION (OUTPATIENT)
Dept: HOME HEALTH SERVICES | Facility: HOME HEALTH | Age: 73
End: 2018-01-01

## 2018-01-01 ENCOUNTER — HOSPITAL ENCOUNTER (OUTPATIENT)
Dept: LAB | Age: 73
Discharge: HOME | End: 2018-01-23
Payer: MEDICARE

## 2018-01-01 ENCOUNTER — HOME CARE VISIT (OUTPATIENT)
Dept: SCHEDULING | Facility: HOME HEALTH | Age: 73
End: 2018-01-01

## 2018-01-01 ENCOUNTER — HOSPITAL ENCOUNTER (OUTPATIENT)
Dept: INFUSION THERAPY | Age: 73
End: 2018-01-01

## 2018-01-01 ENCOUNTER — HOSPITAL ENCOUNTER (OUTPATIENT)
Dept: LAB | Age: 73
Discharge: HOME | End: 2018-05-01
Payer: MEDICARE

## 2018-01-01 VITALS
SYSTOLIC BLOOD PRESSURE: 61 MMHG | RESPIRATION RATE: 18 BRPM | TEMPERATURE: 100.1 F | HEART RATE: 129 BPM | DIASTOLIC BLOOD PRESSURE: 33 MMHG

## 2018-01-01 VITALS
WEIGHT: 151.2 LBS | HEIGHT: 64 IN | OXYGEN SATURATION: 98 % | RESPIRATION RATE: 18 BRPM | SYSTOLIC BLOOD PRESSURE: 138 MMHG | HEART RATE: 68 BPM | BODY MASS INDEX: 25.81 KG/M2 | TEMPERATURE: 98.3 F | DIASTOLIC BLOOD PRESSURE: 69 MMHG

## 2018-01-01 VITALS
SYSTOLIC BLOOD PRESSURE: 115 MMHG | BODY MASS INDEX: 23.22 KG/M2 | TEMPERATURE: 98 F | DIASTOLIC BLOOD PRESSURE: 58 MMHG | WEIGHT: 136 LBS | OXYGEN SATURATION: 94 % | HEIGHT: 64 IN | HEART RATE: 84 BPM | RESPIRATION RATE: 16 BRPM

## 2018-01-01 VITALS
RESPIRATION RATE: 28 BRPM | BODY MASS INDEX: 23.05 KG/M2 | WEIGHT: 135 LBS | SYSTOLIC BLOOD PRESSURE: 110 MMHG | HEART RATE: 80 BPM | HEIGHT: 64 IN | DIASTOLIC BLOOD PRESSURE: 70 MMHG

## 2018-01-01 VITALS — SYSTOLIC BLOOD PRESSURE: 102 MMHG | HEART RATE: 84 BPM | RESPIRATION RATE: 20 BRPM | DIASTOLIC BLOOD PRESSURE: 60 MMHG

## 2018-01-01 VITALS — RESPIRATION RATE: 24 BRPM | SYSTOLIC BLOOD PRESSURE: 108 MMHG | DIASTOLIC BLOOD PRESSURE: 64 MMHG | HEART RATE: 84 BPM

## 2018-01-01 VITALS — HEART RATE: 84 BPM | RESPIRATION RATE: 24 BRPM | DIASTOLIC BLOOD PRESSURE: 70 MMHG | SYSTOLIC BLOOD PRESSURE: 116 MMHG

## 2018-01-01 VITALS
OXYGEN SATURATION: 91 % | WEIGHT: 144 LBS | TEMPERATURE: 98.8 F | HEART RATE: 87 BPM | DIASTOLIC BLOOD PRESSURE: 68 MMHG | RESPIRATION RATE: 16 BRPM | SYSTOLIC BLOOD PRESSURE: 117 MMHG | HEIGHT: 64 IN | BODY MASS INDEX: 24.59 KG/M2

## 2018-01-01 VITALS — SYSTOLIC BLOOD PRESSURE: 96 MMHG | HEART RATE: 88 BPM | RESPIRATION RATE: 20 BRPM | DIASTOLIC BLOOD PRESSURE: 60 MMHG

## 2018-01-01 VITALS — BODY MASS INDEX: 23.9 KG/M2 | WEIGHT: 140 LBS | HEIGHT: 64 IN

## 2018-01-01 VITALS — DIASTOLIC BLOOD PRESSURE: 64 MMHG | RESPIRATION RATE: 16 BRPM | HEART RATE: 92 BPM | SYSTOLIC BLOOD PRESSURE: 124 MMHG

## 2018-01-01 VITALS
OXYGEN SATURATION: 97 % | DIASTOLIC BLOOD PRESSURE: 61 MMHG | RESPIRATION RATE: 20 BRPM | BODY MASS INDEX: 23.05 KG/M2 | HEART RATE: 85 BPM | SYSTOLIC BLOOD PRESSURE: 118 MMHG | HEIGHT: 64 IN | TEMPERATURE: 98.8 F | WEIGHT: 135 LBS

## 2018-01-01 VITALS — RESPIRATION RATE: 32 BRPM | OXYGEN SATURATION: 88 % | HEART RATE: 108 BPM

## 2018-01-01 VITALS — SYSTOLIC BLOOD PRESSURE: 122 MMHG | HEART RATE: 84 BPM | DIASTOLIC BLOOD PRESSURE: 70 MMHG | RESPIRATION RATE: 28 BRPM

## 2018-01-01 VITALS — TEMPERATURE: 99 F

## 2018-01-01 VITALS — WEIGHT: 142.7 LBS | BODY MASS INDEX: 24.49 KG/M2

## 2018-01-01 VITALS — HEART RATE: 100 BPM | DIASTOLIC BLOOD PRESSURE: 60 MMHG | RESPIRATION RATE: 16 BRPM | SYSTOLIC BLOOD PRESSURE: 104 MMHG

## 2018-01-01 VITALS — HEART RATE: 92 BPM | RESPIRATION RATE: 28 BRPM | SYSTOLIC BLOOD PRESSURE: 102 MMHG | DIASTOLIC BLOOD PRESSURE: 60 MMHG

## 2018-01-01 VITALS — DIASTOLIC BLOOD PRESSURE: 60 MMHG | HEART RATE: 92 BPM | SYSTOLIC BLOOD PRESSURE: 104 MMHG | RESPIRATION RATE: 24 BRPM

## 2018-01-01 VITALS — RESPIRATION RATE: 20 BRPM | SYSTOLIC BLOOD PRESSURE: 112 MMHG | DIASTOLIC BLOOD PRESSURE: 64 MMHG | HEART RATE: 88 BPM

## 2018-01-01 DIAGNOSIS — J44.1 COPD EXACERBATION (HCC): ICD-10-CM

## 2018-01-01 DIAGNOSIS — J96.11 CHRONIC RESPIRATORY FAILURE WITH HYPOXIA (HCC): Chronic | ICD-10-CM

## 2018-01-01 DIAGNOSIS — C34.91 NON-SMALL CELL CANCER OF RIGHT LUNG (HCC): ICD-10-CM

## 2018-01-01 DIAGNOSIS — C34.90 NON-SMALL CELL LUNG CANCER, UNSPECIFIED LATERALITY (HCC): Chronic | ICD-10-CM

## 2018-01-01 DIAGNOSIS — R52 PAIN: ICD-10-CM

## 2018-01-01 DIAGNOSIS — E87.6 HYPOKALEMIA: ICD-10-CM

## 2018-01-01 DIAGNOSIS — D89.89 OTHER SPECIFIED DISORDERS INVOLVING THE IMMUNE MECHANISM, NOT ELSEWHERE CLASSIFIED (HCC): ICD-10-CM

## 2018-01-01 DIAGNOSIS — R82.90 ABNORMAL FINDING IN URINE: ICD-10-CM

## 2018-01-01 DIAGNOSIS — R06.02 SOB (SHORTNESS OF BREATH): ICD-10-CM

## 2018-01-01 DIAGNOSIS — A41.9 SEPSIS, DUE TO UNSPECIFIED ORGANISM: Primary | ICD-10-CM

## 2018-01-01 DIAGNOSIS — J42 CHRONIC BRONCHITIS, UNSPECIFIED CHRONIC BRONCHITIS TYPE (HCC): Chronic | ICD-10-CM

## 2018-01-01 DIAGNOSIS — C34.91 NON-SMALL CELL CANCER OF RIGHT LUNG (HCC): Chronic | ICD-10-CM

## 2018-01-01 DIAGNOSIS — G89.3 CHRONIC PAIN DUE TO NEOPLASM: Chronic | ICD-10-CM

## 2018-01-01 DIAGNOSIS — C34.90 MALIGNANT NEOPLASM OF LUNG, UNSPECIFIED LATERALITY, UNSPECIFIED PART OF LUNG (HCC): ICD-10-CM

## 2018-01-01 DIAGNOSIS — J44.1 COPD EXACERBATION (HCC): Primary | ICD-10-CM

## 2018-01-01 DIAGNOSIS — R05.9 COUGH: ICD-10-CM

## 2018-01-01 DIAGNOSIS — J18.9 PNEUMONIA DUE TO INFECTIOUS ORGANISM, UNSPECIFIED LATERALITY, UNSPECIFIED PART OF LUNG: ICD-10-CM

## 2018-01-01 DIAGNOSIS — R11.0 NAUSEA: ICD-10-CM

## 2018-01-01 DIAGNOSIS — R09.02 HYPOXEMIA: Chronic | ICD-10-CM

## 2018-01-01 DIAGNOSIS — F41.9 ANXIETY: Chronic | ICD-10-CM

## 2018-01-01 DIAGNOSIS — Z79.52 CURRENT CHRONIC USE OF SYSTEMIC STEROIDS: Chronic | ICD-10-CM

## 2018-01-01 DIAGNOSIS — R07.81 RIB PAIN ON RIGHT SIDE: ICD-10-CM

## 2018-01-01 LAB
ALBUMIN SERPL-MCNC: 2.5 G/DL (ref 3.2–4.6)
ALBUMIN SERPL-MCNC: 2.7 G/DL (ref 3.2–4.6)
ALBUMIN SERPL-MCNC: 2.8 G/DL (ref 3.2–4.6)
ALBUMIN SERPL-MCNC: 2.9 G/DL (ref 3.2–4.6)
ALBUMIN SERPL-MCNC: 3 G/DL (ref 3.2–4.6)
ALBUMIN/GLOB SERPL: 0.8 {RATIO} (ref 1.2–3.5)
ALBUMIN/GLOB SERPL: 0.9 {RATIO} (ref 1.2–3.5)
ALP SERPL-CCNC: 51 U/L (ref 50–136)
ALP SERPL-CCNC: 66 U/L (ref 50–136)
ALP SERPL-CCNC: 69 U/L (ref 50–136)
ALP SERPL-CCNC: 70 U/L (ref 50–136)
ALP SERPL-CCNC: 77 U/L (ref 50–136)
ALP SERPL-CCNC: 79 U/L (ref 50–136)
ALP SERPL-CCNC: 80 U/L (ref 50–136)
ALP SERPL-CCNC: 80 U/L (ref 50–136)
ALP SERPL-CCNC: 81 U/L (ref 50–136)
ALT SERPL-CCNC: 10 U/L (ref 12–65)
ALT SERPL-CCNC: 11 U/L (ref 12–65)
ALT SERPL-CCNC: 11 U/L (ref 12–65)
ALT SERPL-CCNC: 12 U/L (ref 12–65)
ALT SERPL-CCNC: 14 U/L (ref 12–65)
ALT SERPL-CCNC: 15 U/L (ref 12–65)
ALT SERPL-CCNC: 16 U/L (ref 12–65)
ALT SERPL-CCNC: 17 U/L (ref 12–65)
ALT SERPL-CCNC: 17 U/L (ref 12–65)
ANION GAP SERPL CALC-SCNC: 2 MMOL/L (ref 7–16)
ANION GAP SERPL CALC-SCNC: 4 MMOL/L (ref 7–16)
ANION GAP SERPL CALC-SCNC: 5 MMOL/L (ref 7–16)
ANION GAP SERPL CALC-SCNC: 5 MMOL/L (ref 7–16)
ANION GAP SERPL CALC-SCNC: 6 MMOL/L (ref 7–16)
ANION GAP SERPL CALC-SCNC: 6 MMOL/L (ref 7–16)
ANION GAP SERPL CALC-SCNC: 7 MMOL/L (ref 7–16)
ANION GAP SERPL CALC-SCNC: 7 MMOL/L (ref 7–16)
APPEARANCE UR: ABNORMAL
ARTERIAL PATENCY WRIST A: ABNORMAL
ARTERIAL PATENCY WRIST A: POSITIVE
AST SERPL-CCNC: 10 U/L (ref 15–37)
AST SERPL-CCNC: 13 U/L (ref 15–37)
AST SERPL-CCNC: 14 U/L (ref 15–37)
AST SERPL-CCNC: 14 U/L (ref 15–37)
AST SERPL-CCNC: 15 U/L (ref 15–37)
AST SERPL-CCNC: 15 U/L (ref 15–37)
AST SERPL-CCNC: 18 U/L (ref 15–37)
ATRIAL RATE: 108 BPM
ATRIAL RATE: 95 BPM
BACTERIA SPEC CULT: ABNORMAL
BACTERIA SPEC CULT: NORMAL
BACTERIA SPEC CULT: NORMAL
BACTERIA URNS QL MICRO: 0 /HPF
BACTERIA URNS QL MICRO: ABNORMAL /HPF
BASE EXCESS BLDA CALC-SCNC: 6.3 MMOL/L (ref 0–3)
BASE EXCESS BLDA CALC-SCNC: 8.1 MMOL/L (ref 0–3)
BASOPHILS # BLD: 0 K/UL (ref 0–0.2)
BASOPHILS NFR BLD: 0 % (ref 0–2)
BASOPHILS NFR BLD: 1 % (ref 0–2)
BDY SITE: ABNORMAL
BDY SITE: ABNORMAL
BILIRUB SERPL-MCNC: 0.2 MG/DL (ref 0.2–1.1)
BILIRUB SERPL-MCNC: 0.3 MG/DL (ref 0.2–1.1)
BILIRUB SERPL-MCNC: 0.4 MG/DL (ref 0.2–1.1)
BILIRUB SERPL-MCNC: 0.5 MG/DL (ref 0.2–1.1)
BILIRUB SERPL-MCNC: 0.5 MG/DL (ref 0.2–1.1)
BILIRUB UR QL: NEGATIVE
BUN SERPL-MCNC: 10 MG/DL (ref 8–23)
BUN SERPL-MCNC: 14 MG/DL (ref 8–23)
BUN SERPL-MCNC: 14 MG/DL (ref 8–23)
BUN SERPL-MCNC: 19 MG/DL (ref 8–23)
BUN SERPL-MCNC: 7 MG/DL (ref 8–23)
BUN SERPL-MCNC: 8 MG/DL (ref 8–23)
BUN SERPL-MCNC: 9 MG/DL (ref 8–23)
CALCIUM SERPL-MCNC: 8.2 MG/DL (ref 8.3–10.4)
CALCIUM SERPL-MCNC: 8.3 MG/DL (ref 8.3–10.4)
CALCIUM SERPL-MCNC: 8.4 MG/DL (ref 8.3–10.4)
CALCIUM SERPL-MCNC: 8.5 MG/DL (ref 8.3–10.4)
CALCIUM SERPL-MCNC: 8.6 MG/DL (ref 8.3–10.4)
CALCIUM SERPL-MCNC: 8.6 MG/DL (ref 8.3–10.4)
CALCIUM SERPL-MCNC: 8.7 MG/DL (ref 8.3–10.4)
CALCIUM SERPL-MCNC: 8.9 MG/DL (ref 8.3–10.4)
CALCULATED P AXIS, ECG09: 67 DEGREES
CALCULATED P AXIS, ECG09: 82 DEGREES
CALCULATED R AXIS, ECG10: 38 DEGREES
CALCULATED R AXIS, ECG10: 46 DEGREES
CALCULATED T AXIS, ECG11: 60 DEGREES
CALCULATED T AXIS, ECG11: 72 DEGREES
CASTS URNS QL MICRO: 0 /LPF
CASTS URNS QL MICRO: NORMAL /LPF
CHLORIDE SERPL-SCNC: 100 MMOL/L (ref 98–107)
CHLORIDE SERPL-SCNC: 101 MMOL/L (ref 98–107)
CHLORIDE SERPL-SCNC: 102 MMOL/L (ref 98–107)
CHLORIDE SERPL-SCNC: 103 MMOL/L (ref 98–107)
CHLORIDE SERPL-SCNC: 104 MMOL/L (ref 98–107)
CHLORIDE SERPL-SCNC: 105 MMOL/L (ref 98–107)
CHLORIDE SERPL-SCNC: 106 MMOL/L (ref 98–107)
CO2 SERPL-SCNC: 31 MMOL/L (ref 21–32)
CO2 SERPL-SCNC: 32 MMOL/L (ref 21–32)
CO2 SERPL-SCNC: 32 MMOL/L (ref 21–32)
CO2 SERPL-SCNC: 33 MMOL/L (ref 21–32)
CO2 SERPL-SCNC: 34 MMOL/L (ref 21–32)
CO2 SERPL-SCNC: 35 MMOL/L (ref 21–32)
CO2 SERPL-SCNC: 35 MMOL/L (ref 21–32)
CO2 SERPL-SCNC: 36 MMOL/L (ref 21–32)
CO2 SERPL-SCNC: 40 MMOL/L (ref 21–32)
COHGB MFR BLD: 1.1 % (ref 0.5–1.5)
COHGB MFR BLD: 1.7 % (ref 0.5–1.5)
COLOR UR: YELLOW
CREAT SERPL-MCNC: 0.51 MG/DL (ref 0.6–1)
CREAT SERPL-MCNC: 0.64 MG/DL (ref 0.6–1)
CREAT SERPL-MCNC: 0.65 MG/DL (ref 0.6–1)
CREAT SERPL-MCNC: 0.67 MG/DL (ref 0.6–1)
CREAT SERPL-MCNC: 0.74 MG/DL (ref 0.6–1)
CREAT SERPL-MCNC: 0.75 MG/DL (ref 0.6–1)
CREAT SERPL-MCNC: 0.77 MG/DL (ref 0.6–1)
CREAT SERPL-MCNC: 0.78 MG/DL (ref 0.6–1)
CREAT SERPL-MCNC: 0.84 MG/DL (ref 0.6–1)
CRYSTALS URNS QL MICRO: 0 /LPF
DIAGNOSIS, 93000: NORMAL
DIAGNOSIS, 93000: NORMAL
DIFFERENTIAL METHOD BLD: ABNORMAL
DO-HGB BLD-MCNC: 3 % (ref 0–5)
DO-HGB BLD-MCNC: 3 % (ref 0–5)
EOSINOPHIL # BLD: 0 K/UL (ref 0–0.8)
EOSINOPHIL # BLD: 0.1 K/UL (ref 0–0.8)
EOSINOPHIL # BLD: 0.2 K/UL (ref 0–0.8)
EOSINOPHIL NFR BLD: 0 % (ref 0.5–7.8)
EOSINOPHIL NFR BLD: 1 % (ref 0.5–7.8)
EOSINOPHIL NFR BLD: 2 % (ref 0.5–7.8)
EPI CELLS #/AREA URNS HPF: ABNORMAL /HPF
EPI CELLS #/AREA URNS HPF: NORMAL /HPF
ERYTHROCYTE [DISTWIDTH] IN BLOOD BY AUTOMATED COUNT: 14 % (ref 11.9–14.6)
ERYTHROCYTE [DISTWIDTH] IN BLOOD BY AUTOMATED COUNT: 14.2 % (ref 11.9–14.6)
ERYTHROCYTE [DISTWIDTH] IN BLOOD BY AUTOMATED COUNT: 14.4 % (ref 11.9–14.6)
ERYTHROCYTE [DISTWIDTH] IN BLOOD BY AUTOMATED COUNT: 14.9 % (ref 11.9–14.6)
ERYTHROCYTE [DISTWIDTH] IN BLOOD BY AUTOMATED COUNT: 15.2 % (ref 11.9–14.6)
ERYTHROCYTE [DISTWIDTH] IN BLOOD BY AUTOMATED COUNT: 15.6 % (ref 11.9–14.6)
ERYTHROCYTE [DISTWIDTH] IN BLOOD BY AUTOMATED COUNT: 15.8 % (ref 11.9–14.6)
ERYTHROCYTE [DISTWIDTH] IN BLOOD BY AUTOMATED COUNT: 15.9 % (ref 11.9–14.6)
ERYTHROCYTE [DISTWIDTH] IN BLOOD BY AUTOMATED COUNT: 16.1 % (ref 11.9–14.6)
ERYTHROCYTE [DISTWIDTH] IN BLOOD BY AUTOMATED COUNT: 16.2 % (ref 11.9–14.6)
ERYTHROCYTE [DISTWIDTH] IN BLOOD BY AUTOMATED COUNT: 16.3 % (ref 11.9–14.6)
FIO2 ON VENT: 50 %
GAS FLOW.O2 O2 DELIVERY SYS: 4 L/MIN
GAS FLOW.O2 O2 DELIVERY SYS: 40 L/MIN
GLOBULIN SER CALC-MCNC: 3.1 G/DL (ref 2.3–3.5)
GLOBULIN SER CALC-MCNC: 3.2 G/DL (ref 2.3–3.5)
GLOBULIN SER CALC-MCNC: 3.2 G/DL (ref 2.3–3.5)
GLOBULIN SER CALC-MCNC: 3.3 G/DL (ref 2.3–3.5)
GLOBULIN SER CALC-MCNC: 3.3 G/DL (ref 2.3–3.5)
GLOBULIN SER CALC-MCNC: 3.4 G/DL (ref 2.3–3.5)
GLOBULIN SER CALC-MCNC: 3.5 G/DL (ref 2.3–3.5)
GLUCOSE SERPL-MCNC: 102 MG/DL (ref 65–100)
GLUCOSE SERPL-MCNC: 113 MG/DL (ref 65–100)
GLUCOSE SERPL-MCNC: 117 MG/DL (ref 65–100)
GLUCOSE SERPL-MCNC: 120 MG/DL (ref 65–100)
GLUCOSE SERPL-MCNC: 134 MG/DL (ref 65–100)
GLUCOSE SERPL-MCNC: 137 MG/DL (ref 65–100)
GLUCOSE SERPL-MCNC: 140 MG/DL (ref 65–100)
GLUCOSE SERPL-MCNC: 142 MG/DL (ref 65–100)
GLUCOSE SERPL-MCNC: 83 MG/DL (ref 65–100)
GLUCOSE SERPL-MCNC: 87 MG/DL (ref 65–100)
GLUCOSE SERPL-MCNC: 96 MG/DL (ref 65–100)
GLUCOSE UR STRIP.AUTO-MCNC: NEGATIVE MG/DL
HCO3 BLDA-SCNC: 31 MMOL/L (ref 22–26)
HCO3 BLDA-SCNC: 36 MMOL/L (ref 22–26)
HCT VFR BLD AUTO: 29.6 % (ref 35.8–46.3)
HCT VFR BLD AUTO: 30 % (ref 35.8–46.3)
HCT VFR BLD AUTO: 30.5 % (ref 35.8–46.3)
HCT VFR BLD AUTO: 33.7 % (ref 35.8–46.3)
HCT VFR BLD AUTO: 34.3 % (ref 35.8–46.3)
HCT VFR BLD AUTO: 34.7 % (ref 35.8–46.3)
HCT VFR BLD AUTO: 35.9 % (ref 35.8–46.3)
HCT VFR BLD AUTO: 35.9 % (ref 35.8–46.3)
HCT VFR BLD AUTO: 36.5 % (ref 35.8–46.3)
HCT VFR BLD AUTO: 36.7 % (ref 35.8–46.3)
HCT VFR BLD AUTO: 37 % (ref 35.8–46.3)
HGB BLD-MCNC: 10.8 G/DL (ref 11.7–15.4)
HGB BLD-MCNC: 11.1 G/DL (ref 11.7–15.4)
HGB BLD-MCNC: 11.5 G/DL (ref 11.7–15.4)
HGB BLD-MCNC: 11.5 G/DL (ref 11.7–15.4)
HGB BLD-MCNC: 11.7 G/DL (ref 11.7–15.4)
HGB BLD-MCNC: 12.1 G/DL (ref 11.7–15.4)
HGB BLD-MCNC: 12.1 G/DL (ref 11.7–15.4)
HGB BLD-MCNC: 12.2 G/DL (ref 11.7–15.4)
HGB BLD-MCNC: 9.1 G/DL (ref 11.7–15.4)
HGB BLD-MCNC: 9.4 G/DL (ref 11.7–15.4)
HGB BLD-MCNC: 9.6 G/DL (ref 11.7–15.4)
HGB BLDMV-MCNC: 11 GM/DL (ref 11.7–15)
HGB BLDMV-MCNC: 11.8 GM/DL (ref 11.7–15)
HGB UR QL STRIP: ABNORMAL
IMM GRANULOCYTES # BLD: 0 K/UL (ref 0–0.5)
IMM GRANULOCYTES # BLD: 0 K/UL (ref 0–0.5)
IMM GRANULOCYTES # BLD: 0.1 K/UL (ref 0–0.5)
IMM GRANULOCYTES NFR BLD AUTO: 0 % (ref 0–5)
IMM GRANULOCYTES NFR BLD AUTO: 1 % (ref 0–5)
IMM GRANULOCYTES NFR BLD AUTO: 1 % (ref 0–5)
KETONES UR QL STRIP.AUTO: NEGATIVE MG/DL
LACTATE BLD-SCNC: 0.8 MMOL/L (ref 0.5–1.9)
LACTATE BLD-SCNC: 0.9 MMOL/L (ref 0.5–1.9)
LEUKOCYTE ESTERASE UR QL STRIP.AUTO: ABNORMAL
LYMPHOCYTES # BLD: 0.2 K/UL (ref 0.5–4.6)
LYMPHOCYTES # BLD: 0.6 K/UL (ref 0.5–4.6)
LYMPHOCYTES # BLD: 1.1 K/UL (ref 0.5–4.6)
LYMPHOCYTES # BLD: 1.1 K/UL (ref 0.5–4.6)
LYMPHOCYTES # BLD: 1.7 K/UL (ref 0.5–4.6)
LYMPHOCYTES # BLD: 1.7 K/UL (ref 0.5–4.6)
LYMPHOCYTES # BLD: 1.9 K/UL (ref 0.5–4.6)
LYMPHOCYTES # BLD: 2.1 K/UL (ref 0.5–4.6)
LYMPHOCYTES # BLD: 2.1 K/UL (ref 0.5–4.6)
LYMPHOCYTES NFR BLD: 10 % (ref 13–44)
LYMPHOCYTES NFR BLD: 14 % (ref 13–44)
LYMPHOCYTES NFR BLD: 18 % (ref 13–44)
LYMPHOCYTES NFR BLD: 23 % (ref 13–44)
LYMPHOCYTES NFR BLD: 24 % (ref 13–44)
LYMPHOCYTES NFR BLD: 25 % (ref 13–44)
LYMPHOCYTES NFR BLD: 27 % (ref 13–44)
LYMPHOCYTES NFR BLD: 29 % (ref 13–44)
LYMPHOCYTES NFR BLD: 5 % (ref 13–44)
MAGNESIUM SERPL-MCNC: 2 MG/DL (ref 1.8–2.4)
MAGNESIUM SERPL-MCNC: 2.2 MG/DL (ref 1.8–2.4)
MAGNESIUM SERPL-MCNC: 2.4 MG/DL (ref 1.8–2.4)
MCH RBC QN AUTO: 34.2 PG (ref 26.1–32.9)
MCH RBC QN AUTO: 34.3 PG (ref 26.1–32.9)
MCH RBC QN AUTO: 34.4 PG (ref 26.1–32.9)
MCH RBC QN AUTO: 34.6 PG (ref 26.1–32.9)
MCH RBC QN AUTO: 35.2 PG (ref 26.1–32.9)
MCH RBC QN AUTO: 35.3 PG (ref 26.1–32.9)
MCH RBC QN AUTO: 35.4 PG (ref 26.1–32.9)
MCH RBC QN AUTO: 35.7 PG (ref 26.1–32.9)
MCH RBC QN AUTO: 35.8 PG (ref 26.1–32.9)
MCH RBC QN AUTO: 35.8 PG (ref 26.1–32.9)
MCH RBC QN AUTO: 35.9 PG (ref 26.1–32.9)
MCHC RBC AUTO-ENTMCNC: 30.7 G/DL (ref 31.4–35)
MCHC RBC AUTO-ENTMCNC: 31.3 G/DL (ref 31.4–35)
MCHC RBC AUTO-ENTMCNC: 31.5 G/DL (ref 31.4–35)
MCHC RBC AUTO-ENTMCNC: 32 G/DL (ref 31.4–35)
MCHC RBC AUTO-ENTMCNC: 32 G/DL (ref 31.4–35)
MCHC RBC AUTO-ENTMCNC: 32.4 G/DL (ref 31.4–35)
MCHC RBC AUTO-ENTMCNC: 32.6 G/DL (ref 31.4–35)
MCHC RBC AUTO-ENTMCNC: 32.7 G/DL (ref 31.4–35)
MCHC RBC AUTO-ENTMCNC: 33.1 G/DL (ref 31.4–35)
MCHC RBC AUTO-ENTMCNC: 33.2 G/DL (ref 31.4–35)
MCHC RBC AUTO-ENTMCNC: 33.2 G/DL (ref 31.4–35)
MCV RBC AUTO: 106.4 FL (ref 79.6–97.8)
MCV RBC AUTO: 106.8 FL (ref 79.6–97.8)
MCV RBC AUTO: 107.6 FL (ref 79.6–97.8)
MCV RBC AUTO: 108.1 FL (ref 79.6–97.8)
MCV RBC AUTO: 108.1 FL (ref 79.6–97.8)
MCV RBC AUTO: 109.1 FL (ref 79.6–97.8)
MCV RBC AUTO: 109.3 FL (ref 79.6–97.8)
MCV RBC AUTO: 109.5 FL (ref 79.6–97.8)
MCV RBC AUTO: 110.6 FL (ref 79.6–97.8)
MCV RBC AUTO: 111.3 FL (ref 79.6–97.8)
MCV RBC AUTO: 112 FL (ref 79.6–97.8)
METHGB MFR BLD: 0.4 % (ref 0–1.5)
METHGB MFR BLD: 0.5 % (ref 0–1.5)
MM INDURATION POC: NORMAL 0 MM (ref 0–5)
MONOCYTES # BLD: 0.2 K/UL (ref 0.1–1.3)
MONOCYTES # BLD: 0.3 K/UL (ref 0.1–1.3)
MONOCYTES # BLD: 0.6 K/UL (ref 0.1–1.3)
MONOCYTES # BLD: 0.7 K/UL (ref 0.1–1.3)
MONOCYTES NFR BLD: 5 % (ref 4–12)
MONOCYTES NFR BLD: 7 % (ref 4–12)
MONOCYTES NFR BLD: 8 % (ref 4–12)
MONOCYTES NFR BLD: 8 % (ref 4–12)
MONOCYTES NFR BLD: 9 % (ref 4–12)
MUCOUS THREADS URNS QL MICRO: 0 /LPF
NEUTS SEG # BLD: 3 K/UL (ref 1.7–8.2)
NEUTS SEG # BLD: 3.8 K/UL (ref 1.7–8.2)
NEUTS SEG # BLD: 4.4 K/UL (ref 1.7–8.2)
NEUTS SEG # BLD: 4.4 K/UL (ref 1.7–8.2)
NEUTS SEG # BLD: 4.6 K/UL (ref 1.7–8.2)
NEUTS SEG # BLD: 4.7 K/UL (ref 1.7–8.2)
NEUTS SEG # BLD: 5.2 K/UL (ref 1.7–8.2)
NEUTS SEG # BLD: 5.6 K/UL (ref 1.7–8.2)
NEUTS SEG # BLD: 8.6 K/UL (ref 1.7–8.2)
NEUTS SEG NFR BLD: 59 % (ref 43–78)
NEUTS SEG NFR BLD: 65 % (ref 43–78)
NEUTS SEG NFR BLD: 65 % (ref 43–78)
NEUTS SEG NFR BLD: 66 % (ref 43–78)
NEUTS SEG NFR BLD: 68 % (ref 43–78)
NEUTS SEG NFR BLD: 72 % (ref 43–78)
NEUTS SEG NFR BLD: 76 % (ref 43–78)
NEUTS SEG NFR BLD: 81 % (ref 43–78)
NEUTS SEG NFR BLD: 89 % (ref 43–78)
NITRITE UR QL STRIP.AUTO: POSITIVE
NRBC # BLD: 0 K/UL (ref 0–0.2)
OTHER OBSERVATIONS,UCOM: ABNORMAL
OXYHGB MFR BLDA: 95 % (ref 94–97)
OXYHGB MFR BLDA: 95.9 % (ref 94–97)
P-R INTERVAL, ECG05: 150 MS
P-R INTERVAL, ECG05: 156 MS
PCO2 BLDA: 45 MMHG (ref 35–45)
PCO2 BLDA: 72 MMHG (ref 35–45)
PH BLDA: 7.32 [PH] (ref 7.35–7.45)
PH BLDA: 7.46 [PH] (ref 7.35–7.45)
PH UR STRIP: 5.5 [PH] (ref 5–9)
PLATELET # BLD AUTO: 117 K/UL (ref 150–450)
PLATELET # BLD AUTO: 122 K/UL (ref 150–450)
PLATELET # BLD AUTO: 127 K/UL (ref 150–450)
PLATELET # BLD AUTO: 140 K/UL (ref 150–450)
PLATELET # BLD AUTO: 144 K/UL (ref 150–450)
PLATELET # BLD AUTO: 156 K/UL (ref 150–450)
PLATELET # BLD AUTO: 161 K/UL (ref 150–450)
PLATELET # BLD AUTO: 163 K/UL (ref 150–450)
PLATELET # BLD AUTO: 165 K/UL (ref 150–450)
PLATELET # BLD AUTO: 168 K/UL (ref 150–450)
PLATELET # BLD AUTO: 174 K/UL (ref 150–450)
PMV BLD AUTO: 7.9 FL (ref 10.8–14.1)
PMV BLD AUTO: 7.9 FL (ref 10.8–14.1)
PMV BLD AUTO: 8.3 FL (ref 10.8–14.1)
PMV BLD AUTO: 8.4 FL (ref 10.8–14.1)
PMV BLD AUTO: 8.6 FL (ref 10.8–14.1)
PMV BLD AUTO: 8.7 FL (ref 10.8–14.1)
PMV BLD AUTO: 8.8 FL (ref 10.8–14.1)
PO2 BLDA: 106 MMHG (ref 80–105)
PO2 BLDA: 107 MMHG (ref 80–105)
POTASSIUM SERPL-SCNC: 3.2 MMOL/L (ref 3.5–5.1)
POTASSIUM SERPL-SCNC: 3.2 MMOL/L (ref 3.5–5.1)
POTASSIUM SERPL-SCNC: 3.3 MMOL/L (ref 3.5–5.1)
POTASSIUM SERPL-SCNC: 3.4 MMOL/L (ref 3.5–5.1)
POTASSIUM SERPL-SCNC: 3.5 MMOL/L (ref 3.5–5.1)
POTASSIUM SERPL-SCNC: 3.8 MMOL/L (ref 3.5–5.1)
POTASSIUM SERPL-SCNC: 3.9 MMOL/L (ref 3.5–5.1)
POTASSIUM SERPL-SCNC: 4 MMOL/L (ref 3.5–5.1)
POTASSIUM SERPL-SCNC: 4.2 MMOL/L (ref 3.5–5.1)
PPD POC: NORMAL NEGATIVE
PROCALCITONIN SERPL-MCNC: <0.1 NG/ML
PROT SERPL-MCNC: 5.7 G/DL (ref 6.3–8.2)
PROT SERPL-MCNC: 6 G/DL (ref 6.3–8.2)
PROT SERPL-MCNC: 6 G/DL (ref 6.3–8.2)
PROT SERPL-MCNC: 6.1 G/DL (ref 6.3–8.2)
PROT SERPL-MCNC: 6.1 G/DL (ref 6.3–8.2)
PROT SERPL-MCNC: 6.3 G/DL (ref 6.3–8.2)
PROT SERPL-MCNC: 6.4 G/DL (ref 6.3–8.2)
PROT SERPL-MCNC: 6.4 G/DL (ref 6.3–8.2)
PROT SERPL-MCNC: 6.5 G/DL (ref 6.3–8.2)
PROT UR STRIP-MCNC: NEGATIVE MG/DL
Q-T INTERVAL, ECG07: 316 MS
Q-T INTERVAL, ECG07: 350 MS
QRS DURATION, ECG06: 70 MS
QRS DURATION, ECG06: 72 MS
QTC CALCULATION (BEZET), ECG08: 423 MS
QTC CALCULATION (BEZET), ECG08: 439 MS
RBC # BLD AUTO: 2.66 M/UL (ref 4.05–5.25)
RBC # BLD AUTO: 2.74 M/UL (ref 4.05–5.25)
RBC # BLD AUTO: 2.79 M/UL (ref 4.05–5.25)
RBC # BLD AUTO: 3.01 M/UL (ref 4.05–5.25)
RBC # BLD AUTO: 3.1 M/UL (ref 4.05–5.25)
RBC # BLD AUTO: 3.25 M/UL (ref 4.05–5.25)
RBC # BLD AUTO: 3.32 M/UL (ref 4.05–5.25)
RBC # BLD AUTO: 3.32 M/UL (ref 4.05–5.25)
RBC # BLD AUTO: 3.39 M/UL (ref 4.05–5.25)
RBC # BLD AUTO: 3.41 M/UL (ref 4.05–5.25)
RBC # BLD AUTO: 3.43 M/UL (ref 4.05–5.25)
RBC #/AREA URNS HPF: ABNORMAL /HPF
RBC #/AREA URNS HPF: NORMAL /HPF
SAO2 % BLD: 97 % (ref 92–98.5)
SAO2 % BLD: 98 % (ref 92–98.5)
SERVICE CMNT-IMP: ABNORMAL
SERVICE CMNT-IMP: ABNORMAL
SERVICE CMNT-IMP: NORMAL
SERVICE CMNT-IMP: NORMAL
SODIUM SERPL-SCNC: 139 MMOL/L (ref 136–145)
SODIUM SERPL-SCNC: 140 MMOL/L (ref 136–145)
SODIUM SERPL-SCNC: 140 MMOL/L (ref 136–145)
SODIUM SERPL-SCNC: 141 MMOL/L (ref 136–145)
SODIUM SERPL-SCNC: 141 MMOL/L (ref 136–145)
SODIUM SERPL-SCNC: 142 MMOL/L (ref 136–145)
SODIUM SERPL-SCNC: 144 MMOL/L (ref 136–145)
SODIUM SERPL-SCNC: 145 MMOL/L (ref 136–145)
SODIUM SERPL-SCNC: 145 MMOL/L (ref 136–145)
SP GR UR REFRACTOMETRY: 1.01 (ref 1–1.02)
T4 SERPL-MCNC: 8.7 UG/DL (ref 4.8–13.9)
TROPONIN I BLD-MCNC: 0 NG/ML (ref 0.02–0.05)
TROPONIN I BLD-MCNC: 0.01 NG/ML (ref 0.02–0.05)
TSH SERPL DL<=0.005 MIU/L-ACNC: 8.07 UIU/ML (ref 0.36–3.74)
UROBILINOGEN UR QL STRIP.AUTO: 0.2 EU/DL (ref 0.2–1)
VENTILATION MODE VENT: ABNORMAL
VENTILATION MODE VENT: ABNORMAL
VENTRICULAR RATE, ECG03: 108 BPM
VENTRICULAR RATE, ECG03: 95 BPM
WBC # BLD AUTO: 10.5 K/UL (ref 4.3–11.1)
WBC # BLD AUTO: 3.2 K/UL (ref 4.3–11.1)
WBC # BLD AUTO: 3.9 K/UL (ref 4.3–11.1)
WBC # BLD AUTO: 4.2 K/UL (ref 4.3–11.1)
WBC # BLD AUTO: 4.6 K/UL (ref 4.3–11.1)
WBC # BLD AUTO: 6.2 K/UL (ref 4.3–11.1)
WBC # BLD AUTO: 7 K/UL (ref 4.3–11.1)
WBC # BLD AUTO: 7.1 K/UL (ref 4.3–11.1)
WBC # BLD AUTO: 7.3 K/UL (ref 4.3–11.1)
WBC # BLD AUTO: 8 K/UL (ref 4.3–11.1)
WBC # BLD AUTO: 8.3 K/UL (ref 4.3–11.1)
WBC URNS QL MICRO: ABNORMAL /HPF
WBC URNS QL MICRO: NORMAL /HPF

## 2018-01-01 PROCEDURE — 99343 PR HOME VST NEW PATIENT MOD-HI SEVERITY 45 MINUTES: CPT | Performed by: INTERNAL MEDICINE

## 2018-01-01 PROCEDURE — HOSPICE MEDICATION HC HH HOSPICE MEDICATION

## 2018-01-01 PROCEDURE — 0651 HSPC ROUTINE HOME CARE

## 2018-01-01 PROCEDURE — 3336500001 HSPC ELECTION

## 2018-01-01 PROCEDURE — 94760 N-INVAS EAR/PLS OXIMETRY 1: CPT

## 2018-01-01 PROCEDURE — 87040 BLOOD CULTURE FOR BACTERIA: CPT | Performed by: EMERGENCY MEDICINE

## 2018-01-01 PROCEDURE — 65270000029 HC RM PRIVATE

## 2018-01-01 PROCEDURE — 74011250636 HC RX REV CODE- 250/636: Performed by: INTERNAL MEDICINE

## 2018-01-01 PROCEDURE — 36600 WITHDRAWAL OF ARTERIAL BLOOD: CPT

## 2018-01-01 PROCEDURE — 93005 ELECTROCARDIOGRAM TRACING: CPT

## 2018-01-01 PROCEDURE — 0656 HSPC GENERAL INPATIENT

## 2018-01-01 PROCEDURE — A9270 NON-COVERED ITEM OR SERVICE: HCPCS

## 2018-01-01 PROCEDURE — 86580 TB INTRADERMAL TEST: CPT | Performed by: INTERNAL MEDICINE

## 2018-01-01 PROCEDURE — A4927 NON-STERILE GLOVES: HCPCS

## 2018-01-01 PROCEDURE — 96375 TX/PRO/DX INJ NEW DRUG ADDON: CPT

## 2018-01-01 PROCEDURE — 74011000250 HC RX REV CODE- 250: Performed by: EMERGENCY MEDICINE

## 2018-01-01 PROCEDURE — 82803 BLOOD GASES ANY COMBINATION: CPT

## 2018-01-01 PROCEDURE — G0299 HHS/HOSPICE OF RN EA 15 MIN: HCPCS

## 2018-01-01 PROCEDURE — 74011000250 HC RX REV CODE- 250: Performed by: NURSE PRACTITIONER

## 2018-01-01 PROCEDURE — 99232 SBSQ HOSP IP/OBS MODERATE 35: CPT | Performed by: INTERNAL MEDICINE

## 2018-01-01 PROCEDURE — 74011250637 HC RX REV CODE- 250/637: Performed by: INTERNAL MEDICINE

## 2018-01-01 PROCEDURE — 74011250636 HC RX REV CODE- 250/636: Performed by: NURSE PRACTITIONER

## 2018-01-01 PROCEDURE — 85025 COMPLETE CBC W/AUTO DIFF WBC: CPT | Performed by: INTERNAL MEDICINE

## 2018-01-01 PROCEDURE — 77010033678 HC OXYGEN DAILY

## 2018-01-01 PROCEDURE — 36415 COLL VENOUS BLD VENIPUNCTURE: CPT | Performed by: INTERNAL MEDICINE

## 2018-01-01 PROCEDURE — 83735 ASSAY OF MAGNESIUM: CPT | Performed by: INTERNAL MEDICINE

## 2018-01-01 PROCEDURE — 74011000258 HC RX REV CODE- 258: Performed by: NURSE PRACTITIONER

## 2018-01-01 PROCEDURE — 81003 URINALYSIS AUTO W/O SCOPE: CPT | Performed by: INTERNAL MEDICINE

## 2018-01-01 PROCEDURE — 74011000302 HC RX REV CODE- 302: Performed by: INTERNAL MEDICINE

## 2018-01-01 PROCEDURE — 94640 AIRWAY INHALATION TREATMENT: CPT

## 2018-01-01 PROCEDURE — 85027 COMPLETE CBC AUTOMATED: CPT | Performed by: INTERNAL MEDICINE

## 2018-01-01 PROCEDURE — 80053 COMPREHEN METABOLIC PANEL: CPT

## 2018-01-01 PROCEDURE — 81015 MICROSCOPIC EXAM OF URINE: CPT | Performed by: EMERGENCY MEDICINE

## 2018-01-01 PROCEDURE — 74011000258 HC RX REV CODE- 258: Performed by: INTERNAL MEDICINE

## 2018-01-01 PROCEDURE — BT40ZZZ ULTRASONOGRAPHY OF BLADDER: ICD-10-PCS | Performed by: INTERNAL MEDICINE

## 2018-01-01 PROCEDURE — 96361 HYDRATE IV INFUSION ADD-ON: CPT

## 2018-01-01 PROCEDURE — 74177 CT ABD & PELVIS W/CONTRAST: CPT

## 2018-01-01 PROCEDURE — 93005 ELECTROCARDIOGRAM TRACING: CPT | Performed by: EMERGENCY MEDICINE

## 2018-01-01 PROCEDURE — 74011000250 HC RX REV CODE- 250

## 2018-01-01 PROCEDURE — 80048 BASIC METABOLIC PNL TOTAL CA: CPT | Performed by: INTERNAL MEDICINE

## 2018-01-01 PROCEDURE — 87186 SC STD MICRODIL/AGAR DIL: CPT | Performed by: INTERNAL MEDICINE

## 2018-01-01 PROCEDURE — 74011000250 HC RX REV CODE- 250: Performed by: INTERNAL MEDICINE

## 2018-01-01 PROCEDURE — 80053 COMPREHEN METABOLIC PANEL: CPT | Performed by: INTERNAL MEDICINE

## 2018-01-01 PROCEDURE — 71046 X-RAY EXAM CHEST 2 VIEWS: CPT

## 2018-01-01 PROCEDURE — 97165 OT EVAL LOW COMPLEX 30 MIN: CPT

## 2018-01-01 PROCEDURE — 84436 ASSAY OF TOTAL THYROXINE: CPT | Performed by: INTERNAL MEDICINE

## 2018-01-01 PROCEDURE — 97161 PT EVAL LOW COMPLEX 20 MIN: CPT

## 2018-01-01 PROCEDURE — 80053 COMPREHEN METABOLIC PANEL: CPT | Performed by: EMERGENCY MEDICINE

## 2018-01-01 PROCEDURE — 83605 ASSAY OF LACTIC ACID: CPT

## 2018-01-01 PROCEDURE — G0155 HHCP-SVS OF CSW,EA 15 MIN: HCPCS

## 2018-01-01 PROCEDURE — 99285 EMERGENCY DEPT VISIT HI MDM: CPT

## 2018-01-01 PROCEDURE — 84484 ASSAY OF TROPONIN QUANT: CPT

## 2018-01-01 PROCEDURE — 96367 TX/PROPH/DG ADDL SEQ IV INF: CPT | Performed by: EMERGENCY MEDICINE

## 2018-01-01 PROCEDURE — 71100 X-RAY EXAM RIBS UNI 2 VIEWS: CPT

## 2018-01-01 PROCEDURE — 84145 PROCALCITONIN (PCT): CPT | Performed by: EMERGENCY MEDICINE

## 2018-01-01 PROCEDURE — 74011000258 HC RX REV CODE- 258: Performed by: EMERGENCY MEDICINE

## 2018-01-01 PROCEDURE — 77030012341 HC CHMB SPCR OPTC MDI VYRM -A

## 2018-01-01 PROCEDURE — 71045 X-RAY EXAM CHEST 1 VIEW: CPT

## 2018-01-01 PROCEDURE — 99211 OFF/OP EST MAY X REQ PHY/QHP: CPT

## 2018-01-01 PROCEDURE — 99285 EMERGENCY DEPT VISIT HI MDM: CPT | Performed by: EMERGENCY MEDICINE

## 2018-01-01 PROCEDURE — 74011636320 HC RX REV CODE- 636/320: Performed by: INTERNAL MEDICINE

## 2018-01-01 PROCEDURE — 97166 OT EVAL MOD COMPLEX 45 MIN: CPT

## 2018-01-01 PROCEDURE — 74011250637 HC RX REV CODE- 250/637: Performed by: EMERGENCY MEDICINE

## 2018-01-01 PROCEDURE — 81015 MICROSCOPIC EXAM OF URINE: CPT | Performed by: INTERNAL MEDICINE

## 2018-01-01 PROCEDURE — 84443 ASSAY THYROID STIM HORMONE: CPT | Performed by: INTERNAL MEDICINE

## 2018-01-01 PROCEDURE — 99223 1ST HOSP IP/OBS HIGH 75: CPT | Performed by: INTERNAL MEDICINE

## 2018-01-01 PROCEDURE — 96413 CHEMO IV INFUSION 1 HR: CPT

## 2018-01-01 PROCEDURE — T4527 ADULT SIZE PULL-ON LG: HCPCS

## 2018-01-01 PROCEDURE — 85025 COMPLETE CBC W/AUTO DIFF WBC: CPT

## 2018-01-01 PROCEDURE — 77030020120 HC VLV RESP PEP HI -B

## 2018-01-01 PROCEDURE — 74011636637 HC RX REV CODE- 636/637: Performed by: INTERNAL MEDICINE

## 2018-01-01 PROCEDURE — 87086 URINE CULTURE/COLONY COUNT: CPT | Performed by: INTERNAL MEDICINE

## 2018-01-01 PROCEDURE — 85025 COMPLETE CBC W/AUTO DIFF WBC: CPT | Performed by: EMERGENCY MEDICINE

## 2018-01-01 PROCEDURE — 99284 EMERGENCY DEPT VISIT MOD MDM: CPT

## 2018-01-01 PROCEDURE — 74011250636 HC RX REV CODE- 250/636: Performed by: EMERGENCY MEDICINE

## 2018-01-01 PROCEDURE — 77010033711 HC HIGH FLOW OXYGEN

## 2018-01-01 PROCEDURE — 96365 THER/PROPH/DIAG IV INF INIT: CPT | Performed by: EMERGENCY MEDICINE

## 2018-01-01 PROCEDURE — 87088 URINE BACTERIA CULTURE: CPT | Performed by: INTERNAL MEDICINE

## 2018-01-01 PROCEDURE — A4649 SURGICAL SUPPLIES: HCPCS

## 2018-01-01 PROCEDURE — 81003 URINALYSIS AUTO W/O SCOPE: CPT | Performed by: EMERGENCY MEDICINE

## 2018-01-01 RX ORDER — IPRATROPIUM BROMIDE AND ALBUTEROL SULFATE 2.5; .5 MG/3ML; MG/3ML
3 SOLUTION RESPIRATORY (INHALATION)
Status: COMPLETED | OUTPATIENT
Start: 2018-01-01 | End: 2018-01-01

## 2018-01-01 RX ORDER — SODIUM CHLORIDE 0.9 % (FLUSH) 0.9 %
10 SYRINGE (ML) INJECTION
Status: COMPLETED | OUTPATIENT
Start: 2018-01-01 | End: 2018-01-01

## 2018-01-01 RX ORDER — GUAIFENESIN 600 MG/1
1200 TABLET, EXTENDED RELEASE ORAL 2 TIMES DAILY
Qty: 60 TAB | Refills: 0 | Status: SHIPPED | OUTPATIENT
Start: 2018-01-01 | End: 2018-01-01

## 2018-01-01 RX ORDER — GUAIFENESIN 600 MG/1
1200 TABLET, EXTENDED RELEASE ORAL 2 TIMES DAILY
Status: DISCONTINUED | OUTPATIENT
Start: 2018-01-01 | End: 2018-01-01 | Stop reason: HOSPADM

## 2018-01-01 RX ORDER — VANCOMYCIN/0.9 % SOD CHLORIDE 1.5G/250ML
1500 PLASTIC BAG, INJECTION (ML) INTRAVENOUS ONCE
Status: COMPLETED | OUTPATIENT
Start: 2018-01-01 | End: 2018-01-01

## 2018-01-01 RX ORDER — SODIUM CHLORIDE 0.9 % (FLUSH) 0.9 %
5-10 SYRINGE (ML) INJECTION AS NEEDED
Status: DISCONTINUED | OUTPATIENT
Start: 2018-01-01 | End: 2018-01-01 | Stop reason: HOSPADM

## 2018-01-01 RX ORDER — MORPHINE SULFATE 2 MG/ML
2 INJECTION, SOLUTION INTRAMUSCULAR; INTRAVENOUS EVERY 6 HOURS
Status: DISCONTINUED | OUTPATIENT
Start: 2018-01-01 | End: 2018-01-01

## 2018-01-01 RX ORDER — DOXYCYCLINE 100 MG/1
100 CAPSULE ORAL EVERY 12 HOURS
Qty: 10 CAP | Refills: 0 | Status: SHIPPED | OUTPATIENT
Start: 2018-01-01 | End: 2018-01-01

## 2018-01-01 RX ORDER — SAME BUTANEDISULFONATE/BETAINE 400-600 MG
250 POWDER IN PACKET (EA) ORAL 2 TIMES DAILY
Qty: 14 CAP | Refills: 0 | Status: SHIPPED | OUTPATIENT
Start: 2018-01-01 | End: 2018-01-01

## 2018-01-01 RX ORDER — SODIUM CHLORIDE 0.9 % (FLUSH) 0.9 %
10 SYRINGE (ML) INJECTION AS NEEDED
Status: DISCONTINUED | OUTPATIENT
Start: 2018-01-01 | End: 2018-01-01 | Stop reason: HOSPADM

## 2018-01-01 RX ORDER — SODIUM CHLORIDE 0.9 % (FLUSH) 0.9 %
10 SYRINGE (ML) INJECTION EVERY 12 HOURS
Status: DISCONTINUED | OUTPATIENT
Start: 2018-01-01 | End: 2018-01-01 | Stop reason: HOSPADM

## 2018-01-01 RX ORDER — PREDNISONE 10 MG/1
TABLET ORAL
Qty: 15 TAB | Refills: 0 | Status: SHIPPED | OUTPATIENT
Start: 2018-01-01 | End: 2018-01-01

## 2018-01-01 RX ORDER — HYDRALAZINE HYDROCHLORIDE 20 MG/ML
10 INJECTION INTRAMUSCULAR; INTRAVENOUS
Status: DISCONTINUED | OUTPATIENT
Start: 2018-01-01 | End: 2018-01-01 | Stop reason: HOSPADM

## 2018-01-01 RX ORDER — ZOLPIDEM TARTRATE 5 MG/1
5 TABLET ORAL
Status: DISCONTINUED | OUTPATIENT
Start: 2018-01-01 | End: 2018-01-01 | Stop reason: HOSPADM

## 2018-01-01 RX ORDER — MORPHINE SULFATE 20 MG/ML
10 SOLUTION ORAL
Qty: 30 ML | Refills: 0 | Status: SHIPPED | OUTPATIENT
Start: 2018-01-01 | End: 2018-01-01

## 2018-01-01 RX ORDER — ASPIRIN 81 MG/1
81 TABLET ORAL DAILY
Status: DISCONTINUED | OUTPATIENT
Start: 2018-01-01 | End: 2018-01-01 | Stop reason: HOSPADM

## 2018-01-01 RX ORDER — HALOPERIDOL 5 MG/ML
2 INJECTION INTRAMUSCULAR
Status: DISCONTINUED | OUTPATIENT
Start: 2018-01-01 | End: 2018-01-01 | Stop reason: HOSPADM

## 2018-01-01 RX ORDER — SODIUM CHLORIDE 0.9 % (FLUSH) 0.9 %
10 SYRINGE (ML) INJECTION AS NEEDED
Status: ACTIVE | OUTPATIENT
Start: 2018-01-01 | End: 2018-01-01

## 2018-01-01 RX ORDER — LORAZEPAM 2 MG/ML
2 INJECTION INTRAMUSCULAR
Status: DISCONTINUED | OUTPATIENT
Start: 2018-01-01 | End: 2018-01-01 | Stop reason: HOSPADM

## 2018-01-01 RX ORDER — LORAZEPAM 2 MG/ML
1 INJECTION INTRAMUSCULAR
Status: DISCONTINUED | OUTPATIENT
Start: 2018-01-01 | End: 2018-01-01

## 2018-01-01 RX ORDER — ONDANSETRON 2 MG/ML
4 INJECTION INTRAMUSCULAR; INTRAVENOUS
Status: DISCONTINUED | OUTPATIENT
Start: 2018-01-01 | End: 2018-01-01 | Stop reason: HOSPADM

## 2018-01-01 RX ORDER — LORAZEPAM 0.5 MG/1
0.5 TABLET ORAL
Status: DISCONTINUED | OUTPATIENT
Start: 2018-01-01 | End: 2018-01-01 | Stop reason: HOSPADM

## 2018-01-01 RX ORDER — MORPHINE SULFATE 2 MG/ML
1 INJECTION, SOLUTION INTRAMUSCULAR; INTRAVENOUS
Status: DISCONTINUED | OUTPATIENT
Start: 2018-01-01 | End: 2018-01-01 | Stop reason: HOSPADM

## 2018-01-01 RX ORDER — ONDANSETRON 2 MG/ML
8 INJECTION INTRAMUSCULAR; INTRAVENOUS ONCE
Status: COMPLETED | OUTPATIENT
Start: 2018-01-01 | End: 2018-01-01

## 2018-01-01 RX ORDER — IPRATROPIUM BROMIDE AND ALBUTEROL SULFATE 2.5; .5 MG/3ML; MG/3ML
3 SOLUTION RESPIRATORY (INHALATION)
Status: DISCONTINUED | OUTPATIENT
Start: 2018-01-01 | End: 2018-01-01 | Stop reason: HOSPADM

## 2018-01-01 RX ORDER — OXYCODONE AND ACETAMINOPHEN 7.5; 325 MG/1; MG/1
1 TABLET ORAL
Status: DISCONTINUED | OUTPATIENT
Start: 2018-01-01 | End: 2018-01-01 | Stop reason: HOSPADM

## 2018-01-01 RX ORDER — ENOXAPARIN SODIUM 100 MG/ML
40 INJECTION SUBCUTANEOUS EVERY 24 HOURS
Status: DISCONTINUED | OUTPATIENT
Start: 2018-01-01 | End: 2018-01-01 | Stop reason: HOSPADM

## 2018-01-01 RX ORDER — DOXYCYCLINE 100 MG/1
100 CAPSULE ORAL EVERY 12 HOURS
Status: DISCONTINUED | OUTPATIENT
Start: 2018-01-01 | End: 2018-01-01 | Stop reason: HOSPADM

## 2018-01-01 RX ORDER — HYDROCODONE BITARTRATE AND HOMATROPINE METHYLBROMIDE 1.5; 5 MG/5ML; MG/5ML
5 SYRUP ORAL
Status: CANCELLED | OUTPATIENT
Start: 2018-01-01

## 2018-01-01 RX ORDER — SODIUM CHLORIDE 0.9 % (FLUSH) 0.9 %
10 SYRINGE (ML) INJECTION
Status: ACTIVE | OUTPATIENT
Start: 2018-01-01 | End: 2018-01-01

## 2018-01-01 RX ORDER — FACIAL-BODY WIPES
10 EACH TOPICAL AS NEEDED
Status: DISCONTINUED | OUTPATIENT
Start: 2018-01-01 | End: 2018-01-01 | Stop reason: HOSPADM

## 2018-01-01 RX ORDER — MORPHINE SULFATE ORAL SOLUTION 10 MG/5ML
2.5 SOLUTION ORAL
Qty: 50 ML | Refills: 0 | Status: SHIPPED | OUTPATIENT
Start: 2018-01-01 | End: 2018-01-01 | Stop reason: SDUPTHER

## 2018-01-01 RX ORDER — LEVOTHYROXINE SODIUM 88 UG/1
88 TABLET ORAL
Status: DISCONTINUED | OUTPATIENT
Start: 2018-01-01 | End: 2018-01-01 | Stop reason: HOSPADM

## 2018-01-01 RX ORDER — MIRTAZAPINE 15 MG/1
15 TABLET, FILM COATED ORAL
Status: DISCONTINUED | OUTPATIENT
Start: 2018-01-01 | End: 2018-01-01 | Stop reason: HOSPADM

## 2018-01-01 RX ORDER — HEPARIN 100 UNIT/ML
500 SYRINGE INTRAVENOUS ONCE
Status: COMPLETED | OUTPATIENT
Start: 2018-01-01 | End: 2018-01-01

## 2018-01-01 RX ORDER — BUDESONIDE 0.5 MG/2ML
500 INHALANT ORAL
Status: DISCONTINUED | OUTPATIENT
Start: 2018-01-01 | End: 2018-01-01 | Stop reason: HOSPADM

## 2018-01-01 RX ORDER — SODIUM CHLORIDE 0.9 % (FLUSH) 0.9 %
5-10 SYRINGE (ML) INJECTION EVERY 8 HOURS
Status: DISCONTINUED | OUTPATIENT
Start: 2018-01-01 | End: 2018-01-01 | Stop reason: HOSPADM

## 2018-01-01 RX ORDER — NALOXONE HYDROCHLORIDE 0.4 MG/ML
0.4 INJECTION, SOLUTION INTRAMUSCULAR; INTRAVENOUS; SUBCUTANEOUS AS NEEDED
Status: DISCONTINUED | OUTPATIENT
Start: 2018-01-01 | End: 2018-01-01 | Stop reason: HOSPADM

## 2018-01-01 RX ORDER — ROSUVASTATIN CALCIUM 5 MG/1
10 TABLET, COATED ORAL
Status: DISCONTINUED | OUTPATIENT
Start: 2018-01-01 | End: 2018-01-01 | Stop reason: HOSPADM

## 2018-01-01 RX ORDER — ACETAMINOPHEN 325 MG/1
650 TABLET ORAL
Status: DISCONTINUED | OUTPATIENT
Start: 2018-01-01 | End: 2018-01-01 | Stop reason: HOSPADM

## 2018-01-01 RX ORDER — HEPARIN 100 UNIT/ML
300-500 SYRINGE INTRAVENOUS AS NEEDED
Status: DISPENSED | OUTPATIENT
Start: 2018-01-01 | End: 2018-01-01

## 2018-01-01 RX ORDER — HEPARIN 100 UNIT/ML
300 SYRINGE INTRAVENOUS EVERY 12 HOURS
Status: DISCONTINUED | OUTPATIENT
Start: 2018-01-01 | End: 2018-01-01 | Stop reason: HOSPADM

## 2018-01-01 RX ORDER — ASPIRIN 81 MG/1
81 TABLET ORAL DAILY
Qty: 30 TAB | Refills: 0 | Status: SHIPPED | OUTPATIENT
Start: 2018-01-01 | End: 2018-01-01

## 2018-01-01 RX ORDER — LORAZEPAM 2 MG/ML
1 INJECTION INTRAMUSCULAR EVERY 6 HOURS
Status: DISCONTINUED | OUTPATIENT
Start: 2018-01-01 | End: 2018-01-01

## 2018-01-01 RX ORDER — MORPHINE SULFATE 10 MG/ML
2 INJECTION, SOLUTION INTRAMUSCULAR; INTRAVENOUS
Status: DISCONTINUED | OUTPATIENT
Start: 2018-01-01 | End: 2018-01-01

## 2018-01-01 RX ORDER — CITALOPRAM 20 MG/1
40 TABLET, FILM COATED ORAL DAILY
Status: DISCONTINUED | OUTPATIENT
Start: 2018-01-01 | End: 2018-01-01 | Stop reason: HOSPADM

## 2018-01-01 RX ORDER — ALBUTEROL SULFATE 0.83 MG/ML
5 SOLUTION RESPIRATORY (INHALATION)
Status: COMPLETED | OUTPATIENT
Start: 2018-01-01 | End: 2018-01-01

## 2018-01-01 RX ORDER — ALBUTEROL SULFATE 0.83 MG/ML
10 SOLUTION RESPIRATORY (INHALATION)
Status: DISCONTINUED | OUTPATIENT
Start: 2018-01-01 | End: 2018-01-01

## 2018-01-01 RX ORDER — HEPARIN 100 UNIT/ML
300 SYRINGE INTRAVENOUS AS NEEDED
Status: DISCONTINUED | OUTPATIENT
Start: 2018-01-01 | End: 2018-01-01 | Stop reason: HOSPADM

## 2018-01-01 RX ORDER — ALBUTEROL SULFATE 0.83 MG/ML
2.5 SOLUTION RESPIRATORY (INHALATION)
Status: DISCONTINUED | OUTPATIENT
Start: 2018-01-01 | End: 2018-01-01 | Stop reason: HOSPADM

## 2018-01-01 RX ORDER — METHYLPREDNISOLONE SODIUM SUCCINATE 40 MG/ML
10 INJECTION, POWDER, LYOPHILIZED, FOR SOLUTION INTRAMUSCULAR; INTRAVENOUS ONCE
Status: COMPLETED | OUTPATIENT
Start: 2018-01-01 | End: 2018-01-01

## 2018-01-01 RX ORDER — ACETAMINOPHEN 650 MG/1
650 SUPPOSITORY RECTAL
Status: DISCONTINUED | OUTPATIENT
Start: 2018-01-01 | End: 2018-01-01 | Stop reason: HOSPADM

## 2018-01-01 RX ORDER — MORPHINE SULFATE 2 MG/ML
2 INJECTION, SOLUTION INTRAMUSCULAR; INTRAVENOUS
Status: DISCONTINUED | OUTPATIENT
Start: 2018-01-01 | End: 2018-01-01 | Stop reason: HOSPADM

## 2018-01-01 RX ORDER — OXYCODONE HCL 20 MG/ML
10 CONCENTRATE, ORAL ORAL
Status: CANCELLED | OUTPATIENT
Start: 2018-01-01

## 2018-01-01 RX ORDER — PANTOPRAZOLE SODIUM 40 MG/1
40 TABLET, DELAYED RELEASE ORAL
Status: DISCONTINUED | OUTPATIENT
Start: 2018-01-01 | End: 2018-01-01 | Stop reason: HOSPADM

## 2018-01-01 RX ORDER — GLYCOPYRROLATE 0.2 MG/ML
0.2 INJECTION INTRAMUSCULAR; INTRAVENOUS
Status: DISCONTINUED | OUTPATIENT
Start: 2018-01-01 | End: 2018-01-01 | Stop reason: HOSPADM

## 2018-01-01 RX ORDER — ALBUTEROL SULFATE 0.83 MG/ML
2.5 SOLUTION RESPIRATORY (INHALATION)
Status: DISCONTINUED | OUTPATIENT
Start: 2018-01-01 | End: 2018-01-01

## 2018-01-01 RX ORDER — MORPHINE SULFATE 10 MG/ML
2 INJECTION, SOLUTION INTRAMUSCULAR; INTRAVENOUS
Status: DISCONTINUED | OUTPATIENT
Start: 2018-01-01 | End: 2018-01-01 | Stop reason: SDUPTHER

## 2018-01-01 RX ORDER — PREDNISONE 20 MG/1
20 TABLET ORAL
Status: DISCONTINUED | OUTPATIENT
Start: 2018-01-01 | End: 2018-01-01 | Stop reason: HOSPADM

## 2018-01-01 RX ORDER — ACETAMINOPHEN 500 MG
1000 TABLET ORAL
Status: COMPLETED | OUTPATIENT
Start: 2018-01-01 | End: 2018-01-01

## 2018-01-01 RX ADMIN — METHYLPREDNISOLONE SODIUM SUCCINATE 60 MG: 125 INJECTION, POWDER, FOR SOLUTION INTRAMUSCULAR; INTRAVENOUS at 21:26

## 2018-01-01 RX ADMIN — MORPHINE SULFATE 2 MG: 2 INJECTION, SOLUTION INTRAMUSCULAR; INTRAVENOUS at 11:10

## 2018-01-01 RX ADMIN — Medication 300 UNITS: at 15:13

## 2018-01-01 RX ADMIN — SODIUM CHLORIDE, PRESERVATIVE FREE 10 ML: 5 INJECTION INTRAVENOUS at 11:11

## 2018-01-01 RX ADMIN — ALBUTEROL SULFATE 2.5 MG: 2.5 SOLUTION RESPIRATORY (INHALATION) at 03:44

## 2018-01-01 RX ADMIN — MORPHINE SULFATE 2 MG: 2 INJECTION, SOLUTION INTRAMUSCULAR; INTRAVENOUS at 08:39

## 2018-01-01 RX ADMIN — Medication 300 UNITS: at 17:12

## 2018-01-01 RX ADMIN — LORAZEPAM 1 MG: 2 INJECTION, SOLUTION INTRAMUSCULAR; INTRAVENOUS at 06:17

## 2018-01-01 RX ADMIN — MORPHINE SULFATE 2 MG: 10 INJECTION INTRAMUSCULAR; INTRAVENOUS; SUBCUTANEOUS at 08:32

## 2018-01-01 RX ADMIN — PIPERACILLIN SODIUM,TAZOBACTAM SODIUM 4.5 G: 4; .5 INJECTION, POWDER, FOR SOLUTION INTRAVENOUS at 00:11

## 2018-01-01 RX ADMIN — Medication 10 ML: at 14:40

## 2018-01-01 RX ADMIN — PREDNISONE 20 MG: 20 TABLET ORAL at 09:38

## 2018-01-01 RX ADMIN — ALBUTEROL SULFATE 2.5 MG: 2.5 SOLUTION RESPIRATORY (INHALATION) at 19:52

## 2018-01-01 RX ADMIN — VANCOMYCIN HYDROCHLORIDE 1500 MG: 10 INJECTION, POWDER, LYOPHILIZED, FOR SOLUTION INTRAVENOUS at 01:50

## 2018-01-01 RX ADMIN — LORAZEPAM 0.5 MG: 0.5 TABLET ORAL at 09:40

## 2018-01-01 RX ADMIN — MORPHINE SULFATE 2 MG: 2 INJECTION, SOLUTION INTRAMUSCULAR; INTRAVENOUS at 15:13

## 2018-01-01 RX ADMIN — ONDANSETRON 8 MG: 2 INJECTION INTRAMUSCULAR; INTRAVENOUS at 15:11

## 2018-01-01 RX ADMIN — SODIUM CHLORIDE, PRESERVATIVE FREE 10 ML: 5 INJECTION INTRAVENOUS at 23:08

## 2018-01-01 RX ADMIN — MORPHINE SULFATE 2 MG: 2 INJECTION, SOLUTION INTRAMUSCULAR; INTRAVENOUS at 21:27

## 2018-01-01 RX ADMIN — METHYLPREDNISOLONE SODIUM SUCCINATE 40 MG: 40 INJECTION, POWDER, FOR SOLUTION INTRAMUSCULAR; INTRAVENOUS at 03:53

## 2018-01-01 RX ADMIN — LORAZEPAM 2 MG: 2 INJECTION, SOLUTION INTRAMUSCULAR; INTRAVENOUS at 03:15

## 2018-01-01 RX ADMIN — SODIUM CHLORIDE, PRESERVATIVE FREE 10 ML: 5 INJECTION INTRAVENOUS at 15:50

## 2018-01-01 RX ADMIN — SODIUM CHLORIDE 200 MG: 900 INJECTION, SOLUTION INTRAVENOUS at 13:05

## 2018-01-01 RX ADMIN — Medication 10 ML: at 15:00

## 2018-01-01 RX ADMIN — METHYLPREDNISOLONE SODIUM SUCCINATE 60 MG: 125 INJECTION, POWDER, FOR SOLUTION INTRAMUSCULAR; INTRAVENOUS at 14:28

## 2018-01-01 RX ADMIN — GUAIFENESIN 1200 MG: 600 TABLET, EXTENDED RELEASE ORAL at 09:38

## 2018-01-01 RX ADMIN — BUDESONIDE 500 MCG: 0.5 INHALANT RESPIRATORY (INHALATION) at 19:19

## 2018-01-01 RX ADMIN — ASPIRIN 81 MG: 81 TABLET, COATED ORAL at 08:19

## 2018-01-01 RX ADMIN — MORPHINE SULFATE 2 MG: 2 INJECTION, SOLUTION INTRAMUSCULAR; INTRAVENOUS at 14:43

## 2018-01-01 RX ADMIN — SODIUM CHLORIDE, PRESERVATIVE FREE 10 ML: 5 INJECTION INTRAVENOUS at 11:43

## 2018-01-01 RX ADMIN — Medication 300 UNITS: at 11:43

## 2018-01-01 RX ADMIN — GLYCOPYRROLATE 0.2 MG: 0.2 INJECTION INTRAMUSCULAR; INTRAVENOUS at 21:27

## 2018-01-01 RX ADMIN — MORPHINE SULFATE 10 MG: 20 SOLUTION ORAL at 12:30

## 2018-01-01 RX ADMIN — ZOLPIDEM TARTRATE 5 MG: 5 TABLET ORAL at 20:37

## 2018-01-01 RX ADMIN — LORAZEPAM 0.5 MG: 0.5 TABLET ORAL at 04:45

## 2018-01-01 RX ADMIN — MORPHINE SULFATE 2 MG: 2 INJECTION, SOLUTION INTRAMUSCULAR; INTRAVENOUS at 15:48

## 2018-01-01 RX ADMIN — MORPHINE SULFATE 2 MG: 10 INJECTION INTRAMUSCULAR; INTRAVENOUS; SUBCUTANEOUS at 14:16

## 2018-01-01 RX ADMIN — MORPHINE SULFATE 2 MG: 2 INJECTION, SOLUTION INTRAMUSCULAR; INTRAVENOUS at 13:54

## 2018-01-01 RX ADMIN — Medication 300 UNITS: at 20:29

## 2018-01-01 RX ADMIN — SODIUM CHLORIDE, PRESERVATIVE FREE 10 ML: 5 INJECTION INTRAVENOUS at 14:25

## 2018-01-01 RX ADMIN — MORPHINE SULFATE 2 MG: 2 INJECTION, SOLUTION INTRAMUSCULAR; INTRAVENOUS at 23:05

## 2018-01-01 RX ADMIN — LORAZEPAM 2 MG: 2 INJECTION, SOLUTION INTRAMUSCULAR; INTRAVENOUS at 18:00

## 2018-01-01 RX ADMIN — Medication 10 ML: at 12:18

## 2018-01-01 RX ADMIN — METHYLPREDNISOLONE SODIUM SUCCINATE 40 MG: 40 INJECTION, POWDER, FOR SOLUTION INTRAMUSCULAR; INTRAVENOUS at 08:17

## 2018-01-01 RX ADMIN — Medication 300 UNITS: at 22:05

## 2018-01-01 RX ADMIN — PANTOPRAZOLE SODIUM 40 MG: 40 TABLET, DELAYED RELEASE ORAL at 05:48

## 2018-01-01 RX ADMIN — Medication 10 ML: at 10:20

## 2018-01-01 RX ADMIN — ALBUTEROL SULFATE 2.5 MG: 2.5 SOLUTION RESPIRATORY (INHALATION) at 03:51

## 2018-01-01 RX ADMIN — LEVOTHYROXINE SODIUM 88 MCG: 88 TABLET ORAL at 05:29

## 2018-01-01 RX ADMIN — MORPHINE SULFATE 2 MG: 2 INJECTION, SOLUTION INTRAMUSCULAR; INTRAVENOUS at 10:57

## 2018-01-01 RX ADMIN — MORPHINE SULFATE 2 MG: 10 INJECTION INTRAMUSCULAR; INTRAVENOUS; SUBCUTANEOUS at 01:36

## 2018-01-01 RX ADMIN — SODIUM CHLORIDE, PRESERVATIVE FREE 500 UNITS: 5 INJECTION INTRAVENOUS at 17:52

## 2018-01-01 RX ADMIN — Medication 10 ML: at 16:10

## 2018-01-01 RX ADMIN — Medication 300 UNITS: at 15:21

## 2018-01-01 RX ADMIN — ALBUTEROL SULFATE 2.5 MG: 2.5 SOLUTION RESPIRATORY (INHALATION) at 16:13

## 2018-01-01 RX ADMIN — Medication 10 ML: at 16:36

## 2018-01-01 RX ADMIN — SODIUM CHLORIDE, PRESERVATIVE FREE 10 ML: 5 INJECTION INTRAVENOUS at 05:09

## 2018-01-01 RX ADMIN — METHYLPREDNISOLONE SODIUM SUCCINATE 40 MG: 40 INJECTION, POWDER, FOR SOLUTION INTRAMUSCULAR; INTRAVENOUS at 20:53

## 2018-01-01 RX ADMIN — ALBUTEROL SULFATE 2.5 MG: 2.5 SOLUTION RESPIRATORY (INHALATION) at 04:48

## 2018-01-01 RX ADMIN — LEVOTHYROXINE SODIUM 88 MCG: 88 TABLET ORAL at 05:41

## 2018-01-01 RX ADMIN — MORPHINE SULFATE 2 MG: 2 INJECTION, SOLUTION INTRAMUSCULAR; INTRAVENOUS at 14:24

## 2018-01-01 RX ADMIN — MORPHINE SULFATE 2 MG: 2 INJECTION, SOLUTION INTRAMUSCULAR; INTRAVENOUS at 12:59

## 2018-01-01 RX ADMIN — PANTOPRAZOLE SODIUM 40 MG: 40 TABLET, DELAYED RELEASE ORAL at 05:29

## 2018-01-01 RX ADMIN — MORPHINE SULFATE 2 MG: 2 INJECTION, SOLUTION INTRAMUSCULAR; INTRAVENOUS at 03:15

## 2018-01-01 RX ADMIN — Medication 300 UNITS: at 21:32

## 2018-01-01 RX ADMIN — LORAZEPAM 1 MG: 2 INJECTION, SOLUTION INTRAMUSCULAR; INTRAVENOUS at 20:45

## 2018-01-01 RX ADMIN — SODIUM CHLORIDE, PRESERVATIVE FREE 10 ML: 5 INJECTION INTRAVENOUS at 12:53

## 2018-01-01 RX ADMIN — Medication 300 UNITS: at 08:22

## 2018-01-01 RX ADMIN — ALBUTEROL SULFATE 2.5 MG: 2.5 SOLUTION RESPIRATORY (INHALATION) at 08:25

## 2018-01-01 RX ADMIN — Medication 10 ML: at 20:17

## 2018-01-01 RX ADMIN — ASPIRIN 81 MG: 81 TABLET, COATED ORAL at 08:35

## 2018-01-01 RX ADMIN — MORPHINE SULFATE 2 MG: 10 INJECTION INTRAMUSCULAR; INTRAVENOUS; SUBCUTANEOUS at 13:36

## 2018-01-01 RX ADMIN — MORPHINE SULFATE 2 MG: 2 INJECTION, SOLUTION INTRAMUSCULAR; INTRAVENOUS at 02:49

## 2018-01-01 RX ADMIN — LORAZEPAM 1 MG: 2 INJECTION, SOLUTION INTRAMUSCULAR; INTRAVENOUS at 10:57

## 2018-01-01 RX ADMIN — ZOLPIDEM TARTRATE 5 MG: 5 TABLET ORAL at 22:31

## 2018-01-01 RX ADMIN — LORAZEPAM 1 MG: 2 INJECTION, SOLUTION INTRAMUSCULAR; INTRAVENOUS at 05:01

## 2018-01-01 RX ADMIN — ENOXAPARIN SODIUM 40 MG: 40 INJECTION SUBCUTANEOUS at 03:55

## 2018-01-01 RX ADMIN — SODIUM CHLORIDE, PRESERVATIVE FREE 500 UNITS: 5 INJECTION INTRAVENOUS at 13:43

## 2018-01-01 RX ADMIN — ALBUTEROL SULFATE 2.5 MG: 2.5 SOLUTION RESPIRATORY (INHALATION) at 13:30

## 2018-01-01 RX ADMIN — PANTOPRAZOLE SODIUM 40 MG: 40 TABLET, DELAYED RELEASE ORAL at 05:41

## 2018-01-01 RX ADMIN — DOXYCYCLINE HYCLATE 100 MG: 100 CAPSULE ORAL at 09:38

## 2018-01-01 RX ADMIN — MORPHINE SULFATE 2 MG: 2 INJECTION, SOLUTION INTRAMUSCULAR; INTRAVENOUS at 20:50

## 2018-01-01 RX ADMIN — ROSUVASTATIN CALCIUM 10 MG: 5 TABLET, FILM COATED ORAL at 20:52

## 2018-01-01 RX ADMIN — SODIUM CHLORIDE, PRESERVATIVE FREE 10 ML: 5 INJECTION INTRAVENOUS at 13:00

## 2018-01-01 RX ADMIN — PIPERACILLIN SODIUM,TAZOBACTAM SODIUM 4.5 G: 4; .5 INJECTION, POWDER, FOR SOLUTION INTRAVENOUS at 17:18

## 2018-01-01 RX ADMIN — MORPHINE SULFATE 2 MG: 2 INJECTION, SOLUTION INTRAMUSCULAR; INTRAVENOUS at 05:02

## 2018-01-01 RX ADMIN — LORAZEPAM 1 MG: 2 INJECTION, SOLUTION INTRAMUSCULAR; INTRAVENOUS at 05:09

## 2018-01-01 RX ADMIN — Medication 10 ML: at 14:00

## 2018-01-01 RX ADMIN — ALBUTEROL SULFATE 2.5 MG: 2.5 SOLUTION RESPIRATORY (INHALATION) at 19:19

## 2018-01-01 RX ADMIN — SODIUM CHLORIDE 200 MG: 900 INJECTION, SOLUTION INTRAVENOUS at 15:45

## 2018-01-01 RX ADMIN — SODIUM CHLORIDE, PRESERVATIVE FREE 10 ML: 5 INJECTION INTRAVENOUS at 11:07

## 2018-01-01 RX ADMIN — SODIUM CHLORIDE, PRESERVATIVE FREE 10 ML: 5 INJECTION INTRAVENOUS at 10:58

## 2018-01-01 RX ADMIN — GLYCOPYRROLATE 0.2 MG: 0.2 INJECTION INTRAMUSCULAR; INTRAVENOUS at 23:06

## 2018-01-01 RX ADMIN — MORPHINE SULFATE 2 MG: 2 INJECTION, SOLUTION INTRAMUSCULAR; INTRAVENOUS at 11:07

## 2018-01-01 RX ADMIN — ASPIRIN 81 MG: 81 TABLET, COATED ORAL at 09:38

## 2018-01-01 RX ADMIN — MORPHINE SULFATE 2 MG: 2 INJECTION, SOLUTION INTRAMUSCULAR; INTRAVENOUS at 00:25

## 2018-01-01 RX ADMIN — MORPHINE SULFATE 2 MG: 2 INJECTION, SOLUTION INTRAMUSCULAR; INTRAVENOUS at 17:42

## 2018-01-01 RX ADMIN — LORAZEPAM 2 MG: 2 INJECTION, SOLUTION INTRAMUSCULAR; INTRAVENOUS at 23:08

## 2018-01-01 RX ADMIN — METHYLPREDNISOLONE SODIUM SUCCINATE 40 MG: 40 INJECTION, POWDER, FOR SOLUTION INTRAMUSCULAR; INTRAVENOUS at 08:45

## 2018-01-01 RX ADMIN — MORPHINE SULFATE 2 MG: 2 INJECTION, SOLUTION INTRAMUSCULAR; INTRAVENOUS at 11:26

## 2018-01-01 RX ADMIN — Medication 10 ML: at 08:24

## 2018-01-01 RX ADMIN — TUBERCULIN PURIFIED PROTEIN DERIVATIVE 5 UNITS: 5 INJECTION, SOLUTION INTRADERMAL at 15:56

## 2018-01-01 RX ADMIN — MORPHINE SULFATE 2 MG: 10 INJECTION INTRAMUSCULAR; INTRAVENOUS; SUBCUTANEOUS at 14:27

## 2018-01-01 RX ADMIN — SODIUM CHLORIDE, PRESERVATIVE FREE 10 ML: 5 INJECTION INTRAVENOUS at 14:41

## 2018-01-01 RX ADMIN — IOPAMIDOL 100 ML: 755 INJECTION, SOLUTION INTRAVENOUS at 11:51

## 2018-01-01 RX ADMIN — CITALOPRAM HYDROBROMIDE 40 MG: 20 TABLET ORAL at 08:29

## 2018-01-01 RX ADMIN — GUAIFENESIN 1200 MG: 600 TABLET, EXTENDED RELEASE ORAL at 08:45

## 2018-01-01 RX ADMIN — CITALOPRAM HYDROBROMIDE 40 MG: 20 TABLET ORAL at 08:39

## 2018-01-01 RX ADMIN — ROSUVASTATIN CALCIUM 10 MG: 5 TABLET, FILM COATED ORAL at 22:02

## 2018-01-01 RX ADMIN — HALOPERIDOL LACTATE 2 MG: 5 INJECTION INTRAMUSCULAR at 03:44

## 2018-01-01 RX ADMIN — BUDESONIDE 500 MCG: 0.5 INHALANT RESPIRATORY (INHALATION) at 20:54

## 2018-01-01 RX ADMIN — METHYLPREDNISOLONE SODIUM SUCCINATE 60 MG: 125 INJECTION, POWDER, FOR SOLUTION INTRAMUSCULAR; INTRAVENOUS at 05:06

## 2018-01-01 RX ADMIN — IPRATROPIUM BROMIDE AND ALBUTEROL SULFATE 3 ML: 2.5; .5 SOLUTION RESPIRATORY (INHALATION) at 15:00

## 2018-01-01 RX ADMIN — PIPERACILLIN SODIUM,TAZOBACTAM SODIUM 4.5 G: 4; .5 INJECTION, POWDER, FOR SOLUTION INTRAVENOUS at 08:38

## 2018-01-01 RX ADMIN — CITALOPRAM HYDROBROMIDE 40 MG: 20 TABLET ORAL at 08:36

## 2018-01-01 RX ADMIN — MORPHINE SULFATE 2 MG: 2 INJECTION, SOLUTION INTRAMUSCULAR; INTRAVENOUS at 23:08

## 2018-01-01 RX ADMIN — ENOXAPARIN SODIUM 40 MG: 40 INJECTION SUBCUTANEOUS at 03:02

## 2018-01-01 RX ADMIN — MORPHINE SULFATE 2 MG: 2 INJECTION, SOLUTION INTRAMUSCULAR; INTRAVENOUS at 05:00

## 2018-01-01 RX ADMIN — SODIUM CHLORIDE, PRESERVATIVE FREE 10 ML: 5 INJECTION INTRAVENOUS at 00:25

## 2018-01-01 RX ADMIN — HALOPERIDOL LACTATE 2 MG: 5 INJECTION INTRAMUSCULAR at 15:49

## 2018-01-01 RX ADMIN — MORPHINE SULFATE 2 MG: 10 INJECTION INTRAMUSCULAR; INTRAVENOUS; SUBCUTANEOUS at 22:28

## 2018-01-01 RX ADMIN — Medication 10 ML: at 22:00

## 2018-01-01 RX ADMIN — GUAIFENESIN 1200 MG: 600 TABLET, EXTENDED RELEASE ORAL at 17:18

## 2018-01-01 RX ADMIN — SODIUM CHLORIDE, PRESERVATIVE FREE 10 ML: 5 INJECTION INTRAVENOUS at 20:45

## 2018-01-01 RX ADMIN — GUAIFENESIN 1200 MG: 600 TABLET, EXTENDED RELEASE ORAL at 16:48

## 2018-01-01 RX ADMIN — LORAZEPAM 2 MG: 2 INJECTION, SOLUTION INTRAMUSCULAR; INTRAVENOUS at 15:12

## 2018-01-01 RX ADMIN — SODIUM CHLORIDE, PRESERVATIVE FREE 10 ML: 5 INJECTION INTRAVENOUS at 05:02

## 2018-01-01 RX ADMIN — ROSUVASTATIN CALCIUM 10 MG: 5 TABLET, FILM COATED ORAL at 21:24

## 2018-01-01 RX ADMIN — ASPIRIN 81 MG: 81 TABLET, COATED ORAL at 08:45

## 2018-01-01 RX ADMIN — Medication 10 ML: at 14:16

## 2018-01-01 RX ADMIN — METHYLPREDNISOLONE SODIUM SUCCINATE 10 MG: 40 INJECTION, POWDER, FOR SOLUTION INTRAMUSCULAR; INTRAVENOUS at 12:26

## 2018-01-01 RX ADMIN — OXYCODONE HYDROCHLORIDE AND ACETAMINOPHEN 1 TABLET: 7.5; 325 TABLET ORAL at 05:52

## 2018-01-01 RX ADMIN — SODIUM CHLORIDE, PRESERVATIVE FREE 10 ML: 5 INJECTION INTRAVENOUS at 03:19

## 2018-01-01 RX ADMIN — IPRATROPIUM BROMIDE AND ALBUTEROL SULFATE 3 ML: .5; 3 SOLUTION RESPIRATORY (INHALATION) at 01:20

## 2018-01-01 RX ADMIN — MORPHINE SULFATE 1 MG: 2 INJECTION, SOLUTION INTRAMUSCULAR; INTRAVENOUS at 12:00

## 2018-01-01 RX ADMIN — METHYLPREDNISOLONE SODIUM SUCCINATE 10 MG: 40 INJECTION, POWDER, FOR SOLUTION INTRAMUSCULAR; INTRAVENOUS at 15:00

## 2018-01-01 RX ADMIN — LORAZEPAM 2 MG: 2 INJECTION, SOLUTION INTRAMUSCULAR; INTRAVENOUS at 15:20

## 2018-01-01 RX ADMIN — MORPHINE SULFATE 2 MG: 10 INJECTION INTRAMUSCULAR; INTRAVENOUS; SUBCUTANEOUS at 00:06

## 2018-01-01 RX ADMIN — ALBUTEROL SULFATE 2.5 MG: 2.5 SOLUTION RESPIRATORY (INHALATION) at 23:44

## 2018-01-01 RX ADMIN — MORPHINE SULFATE 2 MG: 2 INJECTION, SOLUTION INTRAMUSCULAR; INTRAVENOUS at 04:16

## 2018-01-01 RX ADMIN — LORAZEPAM 1 MG: 2 INJECTION, SOLUTION INTRAMUSCULAR; INTRAVENOUS at 00:26

## 2018-01-01 RX ADMIN — LORAZEPAM 1 MG: 2 INJECTION, SOLUTION INTRAMUSCULAR; INTRAVENOUS at 20:12

## 2018-01-01 RX ADMIN — CITALOPRAM HYDROBROMIDE 40 MG: 20 TABLET ORAL at 08:45

## 2018-01-01 RX ADMIN — LORAZEPAM 2 MG: 2 INJECTION, SOLUTION INTRAMUSCULAR; INTRAVENOUS at 05:02

## 2018-01-01 RX ADMIN — PANTOPRAZOLE SODIUM 40 MG: 40 TABLET, DELAYED RELEASE ORAL at 08:19

## 2018-01-01 RX ADMIN — LORAZEPAM 0.5 MG: 0.5 TABLET ORAL at 20:33

## 2018-01-01 RX ADMIN — ALBUTEROL SULFATE 2.5 MG: 2.5 SOLUTION RESPIRATORY (INHALATION) at 15:26

## 2018-01-01 RX ADMIN — GUAIFENESIN 1200 MG: 600 TABLET, EXTENDED RELEASE ORAL at 08:39

## 2018-01-01 RX ADMIN — Medication 300 UNITS: at 11:00

## 2018-01-01 RX ADMIN — MORPHINE SULFATE 2 MG: 2 INJECTION, SOLUTION INTRAMUSCULAR; INTRAVENOUS at 23:53

## 2018-01-01 RX ADMIN — CITALOPRAM HYDROBROMIDE 40 MG: 20 TABLET ORAL at 09:38

## 2018-01-01 RX ADMIN — Medication 300 UNITS: at 20:50

## 2018-01-01 RX ADMIN — ALBUTEROL SULFATE 2.5 MG: 2.5 SOLUTION RESPIRATORY (INHALATION) at 11:23

## 2018-01-01 RX ADMIN — LORAZEPAM 1 MG: 2 INJECTION, SOLUTION INTRAMUSCULAR; INTRAVENOUS at 17:11

## 2018-01-01 RX ADMIN — GLYCOPYRROLATE 0.2 MG: 0.2 INJECTION INTRAMUSCULAR; INTRAVENOUS at 11:27

## 2018-01-01 RX ADMIN — PANTOPRAZOLE SODIUM 40 MG: 40 TABLET, DELAYED RELEASE ORAL at 05:06

## 2018-01-01 RX ADMIN — PIPERACILLIN SODIUM,TAZOBACTAM SODIUM 4.5 G: 4; .5 INJECTION, POWDER, FOR SOLUTION INTRAVENOUS at 00:00

## 2018-01-01 RX ADMIN — LORAZEPAM 2 MG: 2 INJECTION, SOLUTION INTRAMUSCULAR; INTRAVENOUS at 00:40

## 2018-01-01 RX ADMIN — Medication 10 ML: at 06:00

## 2018-01-01 RX ADMIN — Medication 10 ML: at 09:27

## 2018-01-01 RX ADMIN — MORPHINE SULFATE 2 MG: 2 INJECTION, SOLUTION INTRAMUSCULAR; INTRAVENOUS at 20:12

## 2018-01-01 RX ADMIN — MORPHINE SULFATE 2 MG: 2 INJECTION, SOLUTION INTRAMUSCULAR; INTRAVENOUS at 05:22

## 2018-01-01 RX ADMIN — BUDESONIDE 500 MCG: 0.5 INHALANT RESPIRATORY (INHALATION) at 08:58

## 2018-01-01 RX ADMIN — Medication 300 UNITS: at 10:21

## 2018-01-01 RX ADMIN — ALBUTEROL SULFATE 2.5 MG: 2.5 SOLUTION RESPIRATORY (INHALATION) at 20:36

## 2018-01-01 RX ADMIN — Medication 10 ML: at 15:14

## 2018-01-01 RX ADMIN — ONDANSETRON 8 MG: 2 INJECTION INTRAMUSCULAR; INTRAVENOUS at 12:28

## 2018-01-01 RX ADMIN — MORPHINE SULFATE 2 MG: 2 INJECTION, SOLUTION INTRAMUSCULAR; INTRAVENOUS at 03:44

## 2018-01-01 RX ADMIN — BUDESONIDE 500 MCG: 0.5 INHALANT RESPIRATORY (INHALATION) at 08:25

## 2018-01-01 RX ADMIN — MORPHINE SULFATE 2 MG: 2 INJECTION, SOLUTION INTRAMUSCULAR; INTRAVENOUS at 11:43

## 2018-01-01 RX ADMIN — Medication 300 UNITS: at 14:25

## 2018-01-01 RX ADMIN — GUAIFENESIN 1200 MG: 600 TABLET, EXTENDED RELEASE ORAL at 08:36

## 2018-01-01 RX ADMIN — LORAZEPAM 2 MG: 2 INJECTION, SOLUTION INTRAMUSCULAR; INTRAVENOUS at 12:59

## 2018-01-01 RX ADMIN — ALBUTEROL SULFATE 2.5 MG: 2.5 SOLUTION RESPIRATORY (INHALATION) at 23:00

## 2018-01-01 RX ADMIN — BUDESONIDE 500 MCG: 0.5 INHALANT RESPIRATORY (INHALATION) at 08:21

## 2018-01-01 RX ADMIN — SODIUM CHLORIDE 500 ML: 900 INJECTION, SOLUTION INTRAVENOUS at 12:18

## 2018-01-01 RX ADMIN — GUAIFENESIN 1200 MG: 600 TABLET, EXTENDED RELEASE ORAL at 17:29

## 2018-01-01 RX ADMIN — LORAZEPAM 2 MG: 2 INJECTION, SOLUTION INTRAMUSCULAR; INTRAVENOUS at 17:42

## 2018-01-01 RX ADMIN — Medication 10 ML: at 21:31

## 2018-01-01 RX ADMIN — Medication 10 ML: at 05:48

## 2018-01-01 RX ADMIN — ENOXAPARIN SODIUM 40 MG: 40 INJECTION SUBCUTANEOUS at 03:46

## 2018-01-01 RX ADMIN — HALOPERIDOL LACTATE 2 MG: 5 INJECTION INTRAMUSCULAR at 14:41

## 2018-01-01 RX ADMIN — MORPHINE SULFATE 2 MG: 2 INJECTION, SOLUTION INTRAMUSCULAR; INTRAVENOUS at 17:10

## 2018-01-01 RX ADMIN — IPRATROPIUM BROMIDE AND ALBUTEROL SULFATE 3 ML: 2.5; .5 SOLUTION RESPIRATORY (INHALATION) at 13:30

## 2018-01-01 RX ADMIN — LORAZEPAM 1 MG: 2 INJECTION, SOLUTION INTRAMUSCULAR; INTRAVENOUS at 14:25

## 2018-01-01 RX ADMIN — GUAIFENESIN 1200 MG: 600 TABLET, EXTENDED RELEASE ORAL at 17:30

## 2018-01-01 RX ADMIN — LORAZEPAM 0.5 MG: 0.5 TABLET ORAL at 08:39

## 2018-01-01 RX ADMIN — ALBUTEROL SULFATE 2.5 MG: 2.5 SOLUTION RESPIRATORY (INHALATION) at 15:31

## 2018-01-01 RX ADMIN — ALBUTEROL SULFATE 2.5 MG: 2.5 SOLUTION RESPIRATORY (INHALATION) at 07:44

## 2018-01-01 RX ADMIN — LORAZEPAM 1 MG: 2 INJECTION, SOLUTION INTRAMUSCULAR; INTRAVENOUS at 23:53

## 2018-01-01 RX ADMIN — MORPHINE SULFATE 2 MG: 2 INJECTION, SOLUTION INTRAMUSCULAR; INTRAVENOUS at 17:11

## 2018-01-01 RX ADMIN — Medication 10 ML: at 20:51

## 2018-01-01 RX ADMIN — ALBUTEROL SULFATE 2.5 MG: 2.5 SOLUTION RESPIRATORY (INHALATION) at 20:06

## 2018-01-01 RX ADMIN — ROSUVASTATIN CALCIUM 5 MG: 5 TABLET, FILM COATED ORAL at 20:32

## 2018-01-01 RX ADMIN — Medication 300 UNITS: at 10:11

## 2018-01-01 RX ADMIN — METHYLPREDNISOLONE SODIUM SUCCINATE 40 MG: 40 INJECTION, POWDER, FOR SOLUTION INTRAMUSCULAR; INTRAVENOUS at 20:34

## 2018-01-01 RX ADMIN — METHYLPREDNISOLONE SODIUM SUCCINATE 60 MG: 125 INJECTION, POWDER, FOR SOLUTION INTRAMUSCULAR; INTRAVENOUS at 22:03

## 2018-01-01 RX ADMIN — MORPHINE SULFATE 2 MG: 2 INJECTION, SOLUTION INTRAMUSCULAR; INTRAVENOUS at 08:22

## 2018-01-01 RX ADMIN — ONDANSETRON 8 MG: 2 INJECTION INTRAMUSCULAR; INTRAVENOUS at 14:57

## 2018-01-01 RX ADMIN — ALBUTEROL SULFATE 2.5 MG: 2.5 SOLUTION RESPIRATORY (INHALATION) at 08:58

## 2018-01-01 RX ADMIN — MORPHINE SULFATE 2 MG: 2 INJECTION, SOLUTION INTRAMUSCULAR; INTRAVENOUS at 18:00

## 2018-01-01 RX ADMIN — DOXYCYCLINE 100 MG: 100 INJECTION, POWDER, LYOPHILIZED, FOR SOLUTION INTRAVENOUS at 20:53

## 2018-01-01 RX ADMIN — SODIUM CHLORIDE, PRESERVATIVE FREE 10 ML: 5 INJECTION INTRAVENOUS at 19:35

## 2018-01-01 RX ADMIN — MORPHINE SULFATE 2 MG: 10 INJECTION INTRAMUSCULAR; INTRAVENOUS; SUBCUTANEOUS at 17:29

## 2018-01-01 RX ADMIN — PIPERACILLIN SODIUM,TAZOBACTAM SODIUM 4.5 G: 4; .5 INJECTION, POWDER, FOR SOLUTION INTRAVENOUS at 15:45

## 2018-01-01 RX ADMIN — LEVOTHYROXINE SODIUM 88 MCG: 88 TABLET ORAL at 08:25

## 2018-01-01 RX ADMIN — LORAZEPAM 1 MG: 2 INJECTION, SOLUTION INTRAMUSCULAR; INTRAVENOUS at 08:52

## 2018-01-01 RX ADMIN — Medication 10 ML: at 08:22

## 2018-01-01 RX ADMIN — BUDESONIDE 500 MCG: 0.5 INHALANT RESPIRATORY (INHALATION) at 07:43

## 2018-01-01 RX ADMIN — ZOLPIDEM TARTRATE 5 MG: 5 TABLET ORAL at 22:02

## 2018-01-01 RX ADMIN — ENOXAPARIN SODIUM 40 MG: 40 INJECTION SUBCUTANEOUS at 03:00

## 2018-01-01 RX ADMIN — LORAZEPAM 2 MG: 2 INJECTION, SOLUTION INTRAMUSCULAR; INTRAVENOUS at 06:19

## 2018-01-01 RX ADMIN — MORPHINE SULFATE 2 MG: 2 INJECTION, SOLUTION INTRAMUSCULAR; INTRAVENOUS at 20:04

## 2018-01-01 RX ADMIN — MORPHINE SULFATE 2 MG: 2 INJECTION, SOLUTION INTRAMUSCULAR; INTRAVENOUS at 15:19

## 2018-01-01 RX ADMIN — MORPHINE SULFATE 2 MG: 2 INJECTION, SOLUTION INTRAMUSCULAR; INTRAVENOUS at 05:08

## 2018-01-01 RX ADMIN — ALBUTEROL SULFATE 2.5 MG: 2.5 SOLUTION RESPIRATORY (INHALATION) at 08:20

## 2018-01-01 RX ADMIN — Medication 10 ML: at 14:33

## 2018-01-01 RX ADMIN — LORAZEPAM 1 MG: 2 CONCENTRATE ORAL at 10:30

## 2018-01-01 RX ADMIN — ALBUTEROL SULFATE 2.5 MG: 2.5 SOLUTION RESPIRATORY (INHALATION) at 23:25

## 2018-01-01 RX ADMIN — METHYLPREDNISOLONE SODIUM SUCCINATE 10 MG: 40 INJECTION, POWDER, FOR SOLUTION INTRAMUSCULAR; INTRAVENOUS at 15:15

## 2018-01-01 RX ADMIN — MORPHINE SULFATE 2 MG: 2 INJECTION, SOLUTION INTRAMUSCULAR; INTRAVENOUS at 06:19

## 2018-01-01 RX ADMIN — LORAZEPAM 0.5 MG: 0.5 TABLET ORAL at 14:28

## 2018-01-01 RX ADMIN — SODIUM CHLORIDE 200 MG: 900 INJECTION, SOLUTION INTRAVENOUS at 15:29

## 2018-01-01 RX ADMIN — ASPIRIN 81 MG: 81 TABLET, COATED ORAL at 08:39

## 2018-01-01 RX ADMIN — SODIUM CHLORIDE, PRESERVATIVE FREE 10 ML: 5 INJECTION INTRAVENOUS at 02:48

## 2018-01-01 RX ADMIN — SODIUM CHLORIDE, PRESERVATIVE FREE 10 ML: 5 INJECTION INTRAVENOUS at 15:20

## 2018-01-01 RX ADMIN — SODIUM CHLORIDE, PRESERVATIVE FREE 10 ML: 5 INJECTION INTRAVENOUS at 17:43

## 2018-01-01 RX ADMIN — MORPHINE SULFATE 2 MG: 2 INJECTION, SOLUTION INTRAMUSCULAR; INTRAVENOUS at 10:11

## 2018-01-01 RX ADMIN — Medication 300 UNITS: at 17:43

## 2018-01-01 RX ADMIN — CHLORASEPTIC 1 SPRAY: 1.5 LIQUID ORAL at 10:38

## 2018-01-01 RX ADMIN — SODIUM CHLORIDE, PRESERVATIVE FREE 10 ML: 5 INJECTION INTRAVENOUS at 06:19

## 2018-01-01 RX ADMIN — SODIUM CHLORIDE, PRESERVATIVE FREE 10 ML: 5 INJECTION INTRAVENOUS at 08:39

## 2018-01-01 RX ADMIN — SODIUM CHLORIDE 100 ML: 900 INJECTION, SOLUTION INTRAVENOUS at 11:51

## 2018-01-01 RX ADMIN — ALBUTEROL SULFATE 5 MG: 2.5 SOLUTION RESPIRATORY (INHALATION) at 00:53

## 2018-01-01 RX ADMIN — LORAZEPAM 2 MG: 2 INJECTION, SOLUTION INTRAMUSCULAR; INTRAVENOUS at 11:07

## 2018-01-01 RX ADMIN — SODIUM CHLORIDE 500 ML: 900 INJECTION, SOLUTION INTRAVENOUS at 15:05

## 2018-01-01 RX ADMIN — LORAZEPAM 1 MG: 2 INJECTION, SOLUTION INTRAMUSCULAR; INTRAVENOUS at 23:05

## 2018-01-01 RX ADMIN — PIPERACILLIN SODIUM,TAZOBACTAM SODIUM 4.5 G: 4; .5 INJECTION, POWDER, FOR SOLUTION INTRAVENOUS at 08:35

## 2018-01-01 RX ADMIN — Medication 10 ML: at 11:51

## 2018-01-01 RX ADMIN — SODIUM CHLORIDE, PRESERVATIVE FREE 10 ML: 5 INJECTION INTRAVENOUS at 17:11

## 2018-01-01 RX ADMIN — MORPHINE SULFATE 2 MG: 2 INJECTION, SOLUTION INTRAMUSCULAR; INTRAVENOUS at 12:52

## 2018-01-01 RX ADMIN — ALBUTEROL SULFATE 2.5 MG: 2.5 SOLUTION RESPIRATORY (INHALATION) at 07:43

## 2018-01-01 RX ADMIN — SODIUM CHLORIDE, PRESERVATIVE FREE 500 UNITS: 5 INJECTION INTRAVENOUS at 16:10

## 2018-01-01 RX ADMIN — LORAZEPAM 0.5 MG: 0.5 TABLET ORAL at 08:54

## 2018-01-01 RX ADMIN — LORAZEPAM 2 MG: 2 INJECTION, SOLUTION INTRAMUSCULAR; INTRAVENOUS at 09:26

## 2018-01-01 RX ADMIN — SODIUM CHLORIDE, PRESERVATIVE FREE 10 ML: 5 INJECTION INTRAVENOUS at 03:04

## 2018-01-01 RX ADMIN — Medication 300 UNITS: at 18:01

## 2018-01-01 RX ADMIN — METHYLPREDNISOLONE SODIUM SUCCINATE 60 MG: 125 INJECTION, POWDER, FOR SOLUTION INTRAMUSCULAR; INTRAVENOUS at 14:22

## 2018-01-01 RX ADMIN — SODIUM CHLORIDE, PRESERVATIVE FREE 500 UNITS: 5 INJECTION INTRAVENOUS at 16:36

## 2018-01-01 RX ADMIN — LORAZEPAM 2 MG: 2 INJECTION, SOLUTION INTRAMUSCULAR; INTRAVENOUS at 20:04

## 2018-01-01 RX ADMIN — OXYCODONE HYDROCHLORIDE AND ACETAMINOPHEN 1 TABLET: 7.5; 325 TABLET ORAL at 03:59

## 2018-01-01 RX ADMIN — Medication 10 ML: at 20:36

## 2018-01-01 RX ADMIN — Medication 10 ML: at 13:42

## 2018-01-01 RX ADMIN — LORAZEPAM 2 MG: 2 INJECTION, SOLUTION INTRAMUSCULAR; INTRAVENOUS at 20:50

## 2018-01-01 RX ADMIN — MORPHINE SULFATE 2 MG: 10 INJECTION INTRAMUSCULAR; INTRAVENOUS; SUBCUTANEOUS at 22:02

## 2018-01-01 RX ADMIN — OXYCODONE HYDROCHLORIDE AND ACETAMINOPHEN 1 TABLET: 7.5; 325 TABLET ORAL at 11:56

## 2018-01-01 RX ADMIN — MORPHINE SULFATE 2 MG: 10 INJECTION INTRAMUSCULAR; INTRAVENOUS; SUBCUTANEOUS at 05:42

## 2018-01-01 RX ADMIN — SODIUM CHLORIDE, PRESERVATIVE FREE 10 ML: 5 INJECTION INTRAVENOUS at 00:46

## 2018-01-01 RX ADMIN — ALBUTEROL SULFATE 2.5 MG: 2.5 SOLUTION RESPIRATORY (INHALATION) at 23:41

## 2018-01-01 RX ADMIN — MORPHINE SULFATE 2 MG: 2 INJECTION, SOLUTION INTRAMUSCULAR; INTRAVENOUS at 02:15

## 2018-01-01 RX ADMIN — Medication 300 UNITS: at 08:58

## 2018-01-01 RX ADMIN — LORAZEPAM 0.5 MG: 0.5 TABLET ORAL at 15:45

## 2018-01-01 RX ADMIN — Medication 10 ML: at 10:14

## 2018-01-01 RX ADMIN — LORAZEPAM 1 MG: 2 INJECTION, SOLUTION INTRAMUSCULAR; INTRAVENOUS at 02:47

## 2018-01-01 RX ADMIN — MORPHINE SULFATE 2 MG: 10 INJECTION INTRAMUSCULAR; INTRAVENOUS; SUBCUTANEOUS at 21:26

## 2018-01-01 RX ADMIN — DOXYCYCLINE 100 MG: 100 INJECTION, POWDER, LYOPHILIZED, FOR SOLUTION INTRAVENOUS at 08:45

## 2018-01-01 RX ADMIN — DOXYCYCLINE 100 MG: 100 INJECTION, POWDER, LYOPHILIZED, FOR SOLUTION INTRAVENOUS at 14:16

## 2018-01-01 RX ADMIN — Medication 10 ML: at 13:36

## 2018-01-01 RX ADMIN — MORPHINE SULFATE 2 MG: 10 INJECTION INTRAMUSCULAR; INTRAVENOUS; SUBCUTANEOUS at 02:52

## 2018-01-01 RX ADMIN — SODIUM CHLORIDE, PRESERVATIVE FREE 10 ML: 5 INJECTION INTRAVENOUS at 15:14

## 2018-01-01 RX ADMIN — ENOXAPARIN SODIUM 40 MG: 40 INJECTION SUBCUTANEOUS at 03:01

## 2018-01-01 RX ADMIN — Medication 300 UNITS: at 09:26

## 2018-01-01 RX ADMIN — BUDESONIDE 500 MCG: 0.5 INHALANT RESPIRATORY (INHALATION) at 07:44

## 2018-01-01 RX ADMIN — GUAIFENESIN 600 MG: 600 TABLET, EXTENDED RELEASE ORAL at 11:31

## 2018-01-01 RX ADMIN — LORAZEPAM 2 MG: 2 INJECTION, SOLUTION INTRAMUSCULAR; INTRAVENOUS at 02:15

## 2018-01-01 RX ADMIN — MORPHINE SULFATE 2 MG: 2 INJECTION, SOLUTION INTRAMUSCULAR; INTRAVENOUS at 19:34

## 2018-01-01 RX ADMIN — ALBUTEROL SULFATE 2.5 MG: 2.5 SOLUTION RESPIRATORY (INHALATION) at 11:21

## 2018-01-01 RX ADMIN — ALBUTEROL SULFATE 2.5 MG: 2.5 SOLUTION RESPIRATORY (INHALATION) at 04:27

## 2018-01-01 RX ADMIN — Medication 300 UNITS: at 11:13

## 2018-01-01 RX ADMIN — LORAZEPAM 1 MG: 2 INJECTION, SOLUTION INTRAMUSCULAR; INTRAVENOUS at 11:10

## 2018-01-01 RX ADMIN — MORPHINE SULFATE 2 MG: 2 INJECTION, SOLUTION INTRAMUSCULAR; INTRAVENOUS at 06:01

## 2018-01-01 RX ADMIN — Medication 10 ML: at 20:11

## 2018-01-01 RX ADMIN — Medication 300 UNITS: at 15:50

## 2018-01-01 RX ADMIN — SODIUM CHLORIDE, PRESERVATIVE FREE 10 ML: 5 INJECTION INTRAVENOUS at 21:28

## 2018-01-01 RX ADMIN — LORAZEPAM 0.5 MG: 0.5 TABLET ORAL at 08:36

## 2018-01-01 RX ADMIN — ALBUTEROL SULFATE 2.5 MG: 2.5 SOLUTION RESPIRATORY (INHALATION) at 11:38

## 2018-01-01 RX ADMIN — LORAZEPAM 2 MG: 2 INJECTION, SOLUTION INTRAMUSCULAR; INTRAVENOUS at 10:10

## 2018-01-01 RX ADMIN — Medication 10 ML: at 14:56

## 2018-01-01 RX ADMIN — Medication 300 UNITS: at 15:00

## 2018-01-01 RX ADMIN — LEVOTHYROXINE SODIUM 88 MCG: 88 TABLET ORAL at 05:48

## 2018-01-01 RX ADMIN — GLYCOPYRROLATE 0.2 MG: 0.2 INJECTION INTRAMUSCULAR; INTRAVENOUS at 02:56

## 2018-01-01 RX ADMIN — Medication 300 UNITS: at 12:54

## 2018-01-01 RX ADMIN — PIPERACILLIN SODIUM,TAZOBACTAM SODIUM 4.5 G: 4; .5 INJECTION, POWDER, FOR SOLUTION INTRAVENOUS at 08:13

## 2018-01-01 RX ADMIN — METHYLPREDNISOLONE SODIUM SUCCINATE 60 MG: 125 INJECTION, POWDER, FOR SOLUTION INTRAMUSCULAR; INTRAVENOUS at 05:41

## 2018-01-01 RX ADMIN — DOXYCYCLINE HYCLATE 100 MG: 100 CAPSULE ORAL at 20:32

## 2018-01-01 RX ADMIN — SODIUM CHLORIDE, PRESERVATIVE FREE 10 ML: 5 INJECTION INTRAVENOUS at 18:01

## 2018-01-01 RX ADMIN — LORAZEPAM 1 MG: 2 INJECTION, SOLUTION INTRAMUSCULAR; INTRAVENOUS at 02:49

## 2018-01-01 RX ADMIN — Medication 10 ML: at 20:38

## 2018-01-01 RX ADMIN — BUDESONIDE 500 MCG: 0.5 INHALANT RESPIRATORY (INHALATION) at 19:52

## 2018-01-01 RX ADMIN — LEVOTHYROXINE SODIUM 88 MCG: 88 TABLET ORAL at 05:06

## 2018-01-01 RX ADMIN — DIATRIZOATE MEGLUMINE AND DIATRIZOATE SODIUM 15 ML: 660; 100 LIQUID ORAL; RECTAL at 11:51

## 2018-01-01 RX ADMIN — MORPHINE SULFATE 2 MG: 2 INJECTION, SOLUTION INTRAMUSCULAR; INTRAVENOUS at 00:40

## 2018-01-01 RX ADMIN — Medication 5 ML: at 09:42

## 2018-01-01 RX ADMIN — SODIUM CHLORIDE 1000 ML: 900 INJECTION, SOLUTION INTRAVENOUS at 00:10

## 2018-01-01 RX ADMIN — Medication 300 UNITS: at 08:24

## 2018-01-01 RX ADMIN — PIPERACILLIN SODIUM,TAZOBACTAM SODIUM 4.5 G: 4; .5 INJECTION, POWDER, FOR SOLUTION INTRAVENOUS at 00:06

## 2018-01-01 RX ADMIN — MORPHINE SULFATE 2 MG: 2 INJECTION, SOLUTION INTRAMUSCULAR; INTRAVENOUS at 06:17

## 2018-01-01 RX ADMIN — LORAZEPAM 0.5 MG: 0.5 TABLET ORAL at 16:48

## 2018-01-01 RX ADMIN — SODIUM CHLORIDE 500 ML: 900 INJECTION, SOLUTION INTRAVENOUS at 14:40

## 2018-01-01 RX ADMIN — ACETAMINOPHEN 1000 MG: 500 TABLET, FILM COATED ORAL at 01:50

## 2018-01-01 RX ADMIN — MORPHINE SULFATE 2 MG: 2 INJECTION, SOLUTION INTRAMUSCULAR; INTRAVENOUS at 09:26

## 2018-01-01 RX ADMIN — Medication 300 UNITS: at 23:06

## 2018-01-01 RX ADMIN — BUDESONIDE 500 MCG: 0.5 INHALANT RESPIRATORY (INHALATION) at 20:36

## 2018-01-01 RX ADMIN — ZOLPIDEM TARTRATE 5 MG: 5 TABLET ORAL at 00:14

## 2018-01-01 RX ADMIN — LORAZEPAM 1 MG: 2 INJECTION, SOLUTION INTRAMUSCULAR; INTRAVENOUS at 08:23

## 2018-01-02 NOTE — PROGRESS NOTES
Massage THERAPY: Daily Note    Referring Physician: Cathi Corrales MD  Medical/Referring Diagnosis: Malignant neoplasm of unspecified part of right bronchus or lung [C34.91]   Precautions/Allergies: Iodinated contrast- oral and iv dye; Sulfa (sulfonamide antibiotics); and Zofran [ondansetron hcl (pf)]  SUBJECTIVE:  Present Symptoms: \"dont feel too good today\" recieviing chemotherapy treatment today     Pre-Treatment Pain: 0/10  Past Medical History:    Ms. Aníbal Riggs  has a past medical history of Acquired hypothyroidism (12/30/2016); Cancer ([facility] COPD; Hypercholesterolemia; Other ill-defined conditions(799.89); Symptomatic cholelithiasis (12/30/2016); and Unspecified sleep apnea. She also has no past medical history of Arthritis; Asthma; Autoimmune disease ([facility] [facility] CAD (coronary artery disease); Chronic kidney disease; DEMENTIA; Diabetes ([facility] [address] [facility] [facility] Hypertension; Infectious disease; Liver disease; Neurological disorder; Psychiatric disorder; PUD (peptic ulcer disease); Seizures ([facility] [address] Sleep disorder; Stroke [facility]); or [facility] Ms. Aníbal Riggs  has a past surgical history that includes hx other surgical.  Current Medications:       Current Outpatient Prescriptions:     predniSONE (DELTASONE) 10 mg tablet, Take 0.5 Tabs by mouth daily (with breakfast). , Disp: 15 Tab, Rfl: 0    predniSONE (DELTASONE) 50 mg tablet, Take 1 tab 13 hours prior to procedure, Take 1 tab 7 hours prior to procedure, Take 1 tab 1 hour prior to procedure, Disp: 3 Tab, Rfl: 0    predniSONE (DELTASONE) 10 mg tablet, Take 1 Tab by mouth daily (with breakfast). , Disp: 30 Tab, Rfl: 2    omeprazole (PRILOSEC) 40 mg capsule, Take 1 Cap by mouth daily. , Disp: 30 Cap, Rfl: 2    mirtazapine (REMERON) 15 mg tablet, Take 1 Tab by mouth nightly., Disp: 30 Tab, Rfl: 2    lidocaine-prilocaine (EMLA) topical cream, Apply  to affected area as needed.  Apply to port site 30 minutes to 1 hour prior to port access, Disp: 30 g, Rfl: 0    albuterol (PROVENTIL VENTOLIN) 2.5 mg /3 mL (0.083 %) nebulizer solution, 3 mL by Nebulization route every four (4) hours as needed for Wheezing or Shortness of Breath (cough). COPD, lung Ca, Medicare 4, Disp: 120 Each, Rfl: 11    albuterol (VENTOLIN HFA) 90 mcg/actuation inhaler, Take 2 Puffs by inhalation every four (4) hours as needed for Wheezing., Disp: , Rfl:     ondansetron hcl (ZOFRAN) 8 mg tablet, Take 1 Tab by mouth every eight (8) hours as needed for Nausea., Disp: 90 Tab, Rfl: 3    rosuvastatin (CRESTOR) 20 mg tablet, Take  by mouth. 1/2 tablet daily, Disp: , Rfl:     levothyroxine (SYNTHROID) 88 mcg tablet, Take  by mouth Daily (before breakfast). , Disp: , Rfl:     guaiFENesin ER (MUCINEX) 600 mg ER tablet, Take 600 mg by mouth daily. , Disp: , Rfl:     OXYGEN-AIR DELIVERY SYSTEMS, by Does Not Apply route nightly. 3 lpm cont., Disp: , Rfl:     LORazepam (ATIVAN) 1 mg tablet, Take 0.5 Tabs by mouth every six (6) hours as needed for Anxiety. Max Daily Amount: 2 mg., Disp: 30 Tab, Rfl: 0    mometasone-formoterol (DULERA) 200-5 mcg/actuation HFA inhaler, Take 2 Puffs by inhalation two (2) times a day., Disp: , Rfl:     aspirin 81 mg CpDR, Take 1 Tab by mouth daily. , Disp: , Rfl:     zolpidem (AMBIEN) 10 mg tablet, Take 10 mg by mouth nightly as needed. , Disp: , Rfl:     citalopram (CELEXA) 40 mg tablet, Take 40 mg by mouth daily. , Disp: , Rfl:     Current Facility-Administered Medications:     sodium chloride 0.9 % bolus infusion 500 mL, 500 mL, IntraVENous, ONCE, Rene Dunn, NP    pembrolizumab (KEYTRUDA) 200 mg in 0.9% sodium chloride 100 mL IVPB, 200 mg, IntraVENous, ONCE, Rene Dunn, NP    saline peripheral flush soln 10 mL, 10 mL, InterCATHeter, PRN, Rene American, NP    heparin (porcine) pf 300-500 Units, 300-500 Units, InterCATHeter, PRN, Rene Dunn NP       OBJECTIVE/ASSESSMENT:  Objective Measure:    Tool Used: Subjective Units of Distress Scale (SUDS)  Score:  Pre-Treatment: /100 Post-Treatment: /100   Interpretation of Score: Rating of patient's distress, fear, anxiety or discomfort on a scale of 0-100. Observations of Patient:  Patient stated she didn't feel too good, otherwise alert, aware  Response To Treatment: relaxed and appreciative of service, \"helped a lot\"   Post-Treatment Pain: 0/10  TREATMENT:    (In addition to Assessment/Re-Assessment sessions the following treatments were rendered)  Treatment Provided:  [x]  Soft tissue massage  []  Healing Touch   Location: bilateral  feet  Patient Position: seated  Time: 15 minutes    PLAN OF CARE:    [x]  I will follow up with this patient as needed. []  No follow up visit necessary.     Thank you for this referral.  Marco Antonio Peoples

## 2018-01-02 NOTE — PROGRESS NOTES
Pt. Discharged ambulatory. Tolerated chemotherapy well. No distress noted. To return to Infusions on 1/23/18.

## 2018-01-23 NOTE — PROGRESS NOTES
Arrived to the [facility]. Patient c/o not feeling well and has chills. Temperature 99. Potassium 3.2 spoke with Fish Horta RN navigator who checked with Dr. Doristine Sandhoff. Potassium Rx to be called in for replacement. Urine order for urinalysis and culture and proceed with chemo. Urine obtained and specimen sent to lab. Patient informed to  potassium and initiate therapy. Chemo completed. Patient tolerated well. Port flushed and de-accessed. Called Fish Horta RN with results of UA. Any issues or concerns during appointment: As noted above. Patient aware of next infusion appointment on 2/13 (date) at 1400 (time). Discharged ambulatory in stable condition.

## 2018-02-13 NOTE — PROGRESS NOTES
Pt arrived ambulatory today at 1435, to receive IV chemotherapy. Pt tolerated without difficulty. Patient discharged via ambulatory accompanied by self. Instructed to notify physician of any problems, questions or concerns. Allowed opportunity for patient/family to ask questions. Verbalized understanding. Next appointment is March 6 at 1400 with Joe Medley.

## 2018-02-13 NOTE — PROGRESS NOTES
Problem: Chemotherapy Treatment  Goal: *Chemotherapy regimen followed  Outcome: Progressing Towards Goal  Verbalizes/demonstrates understanding of purpose/procedure/potential side effects of keytruda.

## 2018-02-13 NOTE — PROGRESS NOTES
Massage THERAPY: Daily Note    Referring Physician: Fidel Fontanez MD  Medical/Referring Diagnosis: Malignant neoplasm of unspecified part of right bronchus or lung [C34.91]   Precautions/Allergies: Iodinated contrast- oral and iv dye; Sulfa (sulfonamide antibiotics); and Zofran [ondansetron hcl (pf)]  SUBJECTIVE:  Present Symptoms: none presented to me today     Pre-Treatment Pain: 0/10  Past Medical History:    Ms. Hyacinth Zarate  has a past medical history of Acquired hypothyroidism (12/30/2016); Cancer ([facility] [address] COPD; Hypercholesterolemia; Other ill-defined conditions(799.89); Symptomatic cholelithiasis (12/30/2016); and Unspecified sleep apnea. She also has no past medical history of Arthritis; Asthma; Autoimmune disease ([facility] [address] CAD (coronary artery disease); Chronic kidney disease; DEMENTIA; Diabetes ([facility] [address] Heart failure ([facility] [address] Hypertension; Infectious disease; Liver disease; Neurological disorder; Psychiatric disorder; PUD (peptic ulcer disease); Seizures ([facility] [address] Sleep disorder; Stroke [facility]); or Thromboembolus ([facility] [address] [facility] Ms. Hyacinth Zarate  has a past surgical history that includes hx other surgical and hx vascular access. Current Medications:       Current Outpatient Prescriptions:     predniSONE (DELTASONE) 50 mg tablet, Take 1 tab 13 hours prior to procedure, Take 1 tab 7 hours prior to procedure, Take 1 tab 1 hour prior to procedure, Disp: 3 Tab, Rfl: 0    amoxicillin-clavulanate (AUGMENTIN) 875-125 mg per tablet, Take 1 Tab by mouth two (2) times a day., Disp: 20 Tab, Rfl: 0    potassium chloride (K-DUR, KLOR-CON) 20 mEq tablet, Take 1 Tab by mouth daily. , Disp: 30 Tab, Rfl: 2    ciprofloxacin HCl (CIPRO) 250 mg tablet, Take 1 Tab by mouth two (2) times a day., Disp: 10 Tab, Rfl: 0    predniSONE (DELTASONE) 10 mg tablet, Take 0.5 Tabs by mouth daily (with breakfast). , Disp: 15 Tab, Rfl: 0    omeprazole (PRILOSEC) 40 mg capsule, Take 1 Cap by mouth daily. , Disp: 30 Cap, Rfl: 2    mirtazapine (REMERON) 15 mg tablet, Take 1 Tab by mouth nightly., Disp: 30 Tab, Rfl: 2    lidocaine-prilocaine (EMLA) topical cream, Apply  to affected area as needed. Apply to port site 30 minutes to 1 hour prior to port access, Disp: 30 g, Rfl: 0    albuterol (PROVENTIL VENTOLIN) 2.5 mg /3 mL (0.083 %) nebulizer solution, 3 mL by Nebulization route every four (4) hours as needed for Wheezing or Shortness of Breath (cough). COPD, lung Ca, Medicare 4, Disp: 120 Each, Rfl: 11    albuterol (VENTOLIN HFA) 90 mcg/actuation inhaler, Take 2 Puffs by inhalation every four (4) hours as needed for Wheezing., Disp: , Rfl:     ondansetron hcl (ZOFRAN) 8 mg tablet, Take 1 Tab by mouth every eight (8) hours as needed for Nausea., Disp: 90 Tab, Rfl: 3    rosuvastatin (CRESTOR) 20 mg tablet, Take  by mouth. 1/2 tablet daily, Disp: , Rfl:     levothyroxine (SYNTHROID) 88 mcg tablet, Take  by mouth Daily (before breakfast). , Disp: , Rfl:     guaiFENesin ER (MUCINEX) 600 mg ER tablet, Take 600 mg by mouth daily. , Disp: , Rfl:     OXYGEN-AIR DELIVERY SYSTEMS, by Does Not Apply route nightly. 3 lpm cont., Disp: , Rfl:     LORazepam (ATIVAN) 1 mg tablet, Take 0.5 Tabs by mouth every six (6) hours as needed for Anxiety. Max Daily Amount: 2 mg., Disp: 30 Tab, Rfl: 0    mometasone-formoterol (DULERA) 200-5 mcg/actuation HFA inhaler, Take 2 Puffs by inhalation two (2) times a day., Disp: , Rfl:     aspirin 81 mg CpDR, Take 1 Tab by mouth daily. , Disp: , Rfl:     zolpidem (AMBIEN) 10 mg tablet, Take 10 mg by mouth nightly as needed. , Disp: , Rfl:     citalopram (CELEXA) 40 mg tablet, Take 40 mg by mouth daily.   , Disp: , Rfl:     Current Facility-Administered Medications:     pembrolizumab (KEYTRUDA) 200 mg in 0.9% sodium chloride 100 mL IVPB, 200 mg, IntraVENous, ONCE, Isabella Ho MD, Stopped at 02/13/18 1600    saline peripheral flush soln 10 mL, 10 mL, InterCATHeter, PRN, Isabella Ho MD, 10 mL at 02/13/18 1514    heparin (porcine) pf 300-500 Units, 300-500 Units, InterCATHeter, PRN, Emilia Macias MD       OBJECTIVE/ASSESSMENT:  Objective Measure: Tool Used: Subjective Units of Distress Scale (SUDS)  Score:  Pre-Treatment: /100 Post-Treatment: /100   Interpretation of Score: Rating of patient's distress, fear, anxiety or discomfort on a scale of 0-100. Observations of Patient:  Pt stated she was tired today, here for chemotherapy treatment  Response To Treatment: relaxed, very appreciative of service   Post-Treatment Pain: 0/10  TREATMENT:    (In addition to Assessment/Re-Assessment sessions the following treatments were rendered)  Treatment Provided:  []  Soft tissue massage  []  Healing Touch   Location: bilateral  feet  Patient Position: seated  Time: 20 minutes    PLAN OF CARE:    []  I will follow up with this patient as needed. []  No follow up visit necessary.     Thank you for this referral.  Amelia Jolly

## 2018-03-16 NOTE — PROGRESS NOTES
Saw patient with Dr Derik Arguello for follow up for lung cancer. Being seen for right sided rib pain. Ordered chest xray and right sided rib xray today. Start Oxycodone every 3 hours as needed. Start Mag Citrate for constipation. Start with 1/2 bottle. If no results, then take the remainder of the bottle. Try Senokot for constipation daily. Start Prednisone for inflammation.  DO NOT take Advil, etc. while on Prednisone.     Follow Up:  Office visit with Dr Derik Arguello

## 2018-03-21 NOTE — PROGRESS NOTES
Consult for bone mets right rib. Pt on O2 at home. CT C/A/P 3-5-18. Pathological fracture on chest xray done 3-16-18. F/u with Dr. Que Rodriguez 3-22-18. Pt c/o pain. Taking pain meds. [facility] CONSENTS SIGNED FOR RT TO THE RIGHT CHEST WALL. CT SIM AT [place] TODAY AT 1 PM.  RECORDS SENT.     Abhishek Harvey RN

## 2018-03-21 NOTE — PROGRESS NOTES
Patient: Paola Harris MRN: [id number]  SSN: [id number]    YOB: 1945  Age: 81 y.o. Sex: female      Other Providers:  Santhosh Zapata MD,     DIAGNOSIS: Stage IB, T2N0M0 lung cancer with primary tumor in the right lower lobe, Recurrent as of 1/31/17    PREVIOUS TREATMENT:  1) SBRT, 50 Gy in 5 fractions completed 10/12/2015  2) 3/2017:  Gemcitabine and Carboplatin, transitioned to single agent [place] 8/2017  4) Pembrolizumab 10/2017  INTERVAL HISTORY:  Paola Harris is a 81 y.o. female who I'm seeing back in follow up at the request of Dr. John Zamorano. She has a history of chronic obstructive pulmonary disease and a 20-pack-year smoking history. She also has a history of non-Hodgkins lymphoma. This was treated in 2008 and 2009 with R- CHOP ×6 cycles. This was following an excisional biopsy of the lesion superior to her right elbow. Therefore it appears she had a stage IE tumor. She did not receive radiation. Regarding her lung cancer, she was seen originally for worsening shortness of breath and was wearing a friends oxygen for several days prior to reevaluation by pulmonary medicine. She has been using nocturnal oxygen dating back to 2012 when she presented with hypoxemia. The most recent episode occurred in July of this year. She had a cough as well as productive purulent sputum. She was placed on doxycycline and had resolution of her symptoms. However, a chest x-ray at that time showed a 3.3 cm peripheral right midlung mass. She denies weight loss, hemoptysis, chest wall pain. A follow-up CT scan 7/31/15 after evaluation in pulmonary medicine revealed a 4.0 x 2.6 cm lobular mass in the anterior aspect of the right lower lobe. There was no suggestion of hilar or mediastinal lymphadenopathy. There was no pleural effusion. A CT-guided biopsy 8/6/2015 was positive for carcinoma, they were unable to differentiate squamous cell carcinoma from adenocarcinoma.  She then had a PET CT scan 8/13/15 which showed hypermetabolic activity in the primary as well as low-level activity in an upper para-aortic lymph node with an SUV of 3.4. She had a staging endobronchial ultrasound with biopsies of the subcarinal lymph node, and the mildly hypermetabolic paratracheal lymph node. There was some atypical cells in the paratracheal lymph node while the subcarinal lymph node was negative. The case was then discussed in tumor board and I was asked to see her in radiation oncology to consider definitive management. We ultimately elected to proceed with radiation which she tolerated well and completed treatment 10/12/2015. We had been following her with serial imaging with scans in January 2017 suggesting the possibility of a local recurrence which was confirmed on biopsy 1/31/2017 and unfortunately showed recurrent squamous cell carcinoma. She was considered for surgery but her COPD made her ineligible and we did not feel additional radiotherapy was feasible. She was also seen by interventional radiology and cryoablation was considered but not completed. She ultimately went on to proceed with chemotherapy in March 2017, gemcitabine and carboplatin. She did fairly well but did have significant enough toxicity where she would switch to single agent gemcitabine in August 2017 after having received a partial response. She progressed in transition to [facility] in  October 2017. Unfortunately imaging again suggested progression of disease in March 2018. She was considered for transition to [facility] but wish to avoid any further therapy. However, during her follow-up 3/16/2018 she was complaining of worsening rib pain not responding well to hydrocodone. X-ray revealed significant increase in size of the right lower lobe tumor with pathologic fractures of the right seventh and eighth ribs. UPDATED PAST MEDICAL HISTORY:  Since our prior encounter, Di Crump has not been hospitalized.   There have been no significant changes to the medical history. MEDICATIONS:     Current Outpatient Prescriptions:     oxyCODONE IR (ROXICODONE) 10 mg tab immediate release tablet, Take 1 Tab by mouth every three (3) hours as needed. Max Daily Amount: 80 mg., Disp: 60 Tab, Rfl: 0    predniSONE (DELTASONE) 10 mg tablet, Take 1 Tab by mouth daily (with breakfast). Take 40 mg daily on Days 1 and 2, Take 30 mg daily on Days 3 and 4, Take 20 mg daily on Days 5 and 6, Take 10 mg daily on Days 7 and 8., Disp: 30 Tab, Rfl: 0    potassium chloride (K-DUR, KLOR-CON) 20 mEq tablet, Take 1 Tab by mouth daily. , Disp: 30 Tab, Rfl: 2    omeprazole (PRILOSEC) 40 mg capsule, Take 1 Cap by mouth daily. , Disp: 30 Cap, Rfl: 2    lidocaine-prilocaine (EMLA) topical cream, Apply  to affected area as needed. Apply to port site 30 minutes to 1 hour prior to port access, Disp: 30 g, Rfl: 0    albuterol (PROVENTIL VENTOLIN) 2.5 mg /3 mL (0.083 %) nebulizer solution, 3 mL by Nebulization route every four (4) hours as needed for Wheezing or Shortness of Breath (cough). COPD, lung Ca, Medicare 4, Disp: 120 Each, Rfl: 11    albuterol (VENTOLIN HFA) 90 mcg/actuation inhaler, Take 2 Puffs by inhalation every four (4) hours as needed for Wheezing., Disp: , Rfl:     ondansetron hcl (ZOFRAN) 8 mg tablet, Take 1 Tab by mouth every eight (8) hours as needed for Nausea., Disp: 90 Tab, Rfl: 3    rosuvastatin (CRESTOR) 20 mg tablet, Take  by mouth. 1/2 tablet daily, Disp: , Rfl:     levothyroxine (SYNTHROID) 88 mcg tablet, Take  by mouth Daily (before breakfast). , Disp: , Rfl:     guaiFENesin ER (MUCINEX) 600 mg ER tablet, Take 600 mg by mouth daily. , Disp: , Rfl:     OXYGEN-AIR DELIVERY SYSTEMS, by Does Not Apply route nightly. 3 lpm cont., Disp: , Rfl:     LORazepam (ATIVAN) 1 mg tablet, Take 0.5 Tabs by mouth every six (6) hours as needed for Anxiety.  Max Daily Amount: 2 mg., Disp: 30 Tab, Rfl: 0    mometasone-formoterol (DULERA) 200-5 mcg/actuation HFA inhaler, Take 2 Puffs by inhalation two (2) times a day., Disp: , Rfl:     zolpidem (AMBIEN) 10 mg tablet, Take 10 mg by mouth nightly as needed. , Disp: , Rfl:     citalopram (CELEXA) 40 mg tablet, Take 40 mg by mouth daily. , Disp: , Rfl:     HYDROcodone-homatropine (HYCODAN) 5-1.5 mg/5 mL (5 mL) syrup, Take 5 mL by mouth four (4) times daily. Max Daily Amount: 20 mL., Disp: 300 mL, Rfl: 0    predniSONE (DELTASONE) 50 mg tablet, Take 1 tab 13 hours prior to procedure, Take 1 tab 7 hours prior to procedure, Take 1 tab 1 hour prior to procedure with 50mg of Benadryl., Disp: 3 Tab, Rfl: 0    predniSONE (DELTASONE) 10 mg tablet, Take 0.5 Tabs by mouth daily (with breakfast). , Disp: 15 Tab, Rfl: 11    mirtazapine (REMERON) 15 mg tablet, Take 1 Tab by mouth nightly., Disp: 30 Tab, Rfl: 2    aspirin 81 mg CpDR, Take 1 Tab by mouth daily. , Disp: , Rfl:     ALLERGIES:   Allergies   Allergen Reactions    Iodinated Contrast- Oral And Iv Dye Nausea and Vomiting    Sulfa (Sulfonamide Antibiotics) Other (comments)     Skin burning, nausea vomiting    Zofran [Ondansetron Hcl (Pf)] Nausea and Vomiting       PHYSICAL EXAMINATION:   ECOG Performance status 1  VITAL SIGNS:   Visit Vitals    /68 (BP 1 Location: Right arm, BP Patient Position: At rest;Sitting)    Pulse 87    Temp 98.8 °F (37.1 °C)    Resp 16    Ht 5' 4\" (1.626 m)    Wt 65.3 kg (144 lb)    SpO2 91%    BMI 24.72 kg/m2        GENERAL: The patient is well-developed, ambulatory, alert and in no acute distress. NECK: Neck is supple with no masses. CARDIOVASCULAR: Heart is regular rate and rhythm. There are no murmurs rubs or gallups. Radial pulses are 2+ RESPIRATORY: Lungs are clear to auscultation and percussion. There is normal respiratory effort. GASTROINTESTINAL: The abdomen is soft, non-tender, nondistended with no hepatospelnomagaly.  Digital rectal examination: deferred LYMPHATIC: There is no cervical, supraclavicular or axillary lymphadenopathy bilaterally. MUSCULOSKELETAL: Extremities reveal no cyanosis, clubbing or edema.  is 5+/5. She does endorse severe pain in the location of the mass in the right lower lobe. NEURO:  Cranial nerves II-XII grossly intact. Muscular strength and sensation are intact throughout all four extremities. LABORATORY:   Lab Results   Component Value Date/Time    Sodium 139 03/09/2018 02:40 PM    Potassium 3.2 (L) 03/09/2018 02:40 PM    Chloride 104 03/09/2018 02:40 PM    CO2 31 03/09/2018 02:40 PM    Anion gap 4 (L) 03/09/2018 02:40 PM    Glucose 87 03/09/2018 02:40 PM    BUN 10 03/09/2018 02:40 PM    Creatinine 0.67 03/09/2018 02:40 PM    GFR est AA >60 03/09/2018 02:40 PM    GFR est non-AA >60 03/09/2018 02:40 PM    Calcium 8.5 03/09/2018 02:40 PM    Magnesium 2.0 01/23/2018 10:40 AM    Albumin 2.9 (L) 03/09/2018 02:40 PM    Protein, total 6.3 03/09/2018 02:40 PM    Globulin 3.4 03/09/2018 02:40 PM    A-G Ratio 0.9 (L) 03/09/2018 02:40 PM    AST (SGOT) 13 (L) 03/09/2018 02:40 PM    ALT (SGPT) 12 03/09/2018 02:40 PM     Lab Results   Component Value Date/Time    WBC 8.3 03/09/2018 02:40 PM    HGB 12.1 03/09/2018 02:40 PM    HCT 36.5 03/09/2018 02:40 PM    PLATELET 253 14/13/3425 02:40 PM       RADIOLOGY:  Xr Chest Pa Lat    Result Date: 3/16/2018  2-VIEW RIGHT RIBS, TWO-VIEW CHEST: CLINICAL HISTORY:  Cough with severe right rib pain in a patient with lung cancer. COMPARISON:  Right RIBS of March 30, 2017 and CT torso of March 5, 2018. RIGHT RIBS:  Metallic skin marker was placed at the site of painful concern. Detailed views of the ribs demonstrate focal sclerotic lesions associated with pathologic fractures posterolaterally of the right seventh and eighth ribs likely secondary to tumor invasion of the chest wall. No radiopaque foreign body is evident. CHEST:  PA and lateral chest images demonstrate interval increase in size of the right lower lobe tumor since January 25.   It measures approximately 7.1 cm craniocaudal x 9.5 cm transverse x 9.2 cm AP compared with 8.0 x 6.3 x 8.5 cm, respectively, in January. The heart size is within normal limits without evidence of congestive heart failure or pneumothorax. The remainder of the bony thorax appears intact on these views. IMPRESSION:  SIGNIFICANT INTERVAL INCREASE IN SIZE OF THE RIGHT LOWER LOBE TUMOR SINCE JANUARY 25 WITH STRAIGHT LATERAL PATHOLOGIC FRACTURES OF RIGHT RIBS 7 AND 8 LIKELY SECONDARY TO CHEST WALL INVASION. Xr Chest Pa Lat    Result Date: 1/25/2018  Two view chest:  01/25/2018 History: Cough, shortness of breath, non-small cell lung carcinoma. . Comparison: 05/31/2017 Findings: A right internal jugular Mediport catheter is unchanged. There has been an interval increase in the mass within the right lower lobe now estimated to measure at least 8 cm in size. There may be a component of postobstructive change inferior to this. There are no pleural effusions. The heart and mediastinal silhouette are normal in size and configuration. The visualized osseous structures are unremarkable. IMPRESSION: Interval increase in the right lower lobe mass compatible with the history of known lung carcinoma. A component inferior to this has a more triangular configuration suggesting post obstructive change. Xr Ribs Rt Uni 2 V    Result Date: 3/16/2018  2-VIEW RIGHT RIBS, TWO-VIEW CHEST: CLINICAL HISTORY:  Cough with severe right rib pain in a patient with lung cancer. COMPARISON:  Right RIBS of March 30, 2017 and CT torso of March 5, 2018. RIGHT RIBS:  Metallic skin marker was placed at the site of painful concern. Detailed views of the ribs demonstrate focal sclerotic lesions associated with pathologic fractures posterolaterally of the right seventh and eighth ribs likely secondary to tumor invasion of the chest wall. No radiopaque foreign body is evident.  CHEST:  PA and lateral chest images demonstrate interval increase in size of the right lower lobe tumor since January 25. It measures approximately 7.1 cm craniocaudal x 9.5 cm transverse x 9.2 cm AP compared with 8.0 x 6.3 x 8.5 cm, respectively, in January. The heart size is within normal limits without evidence of congestive heart failure or pneumothorax. The remainder of the bony thorax appears intact on these views. IMPRESSION:  SIGNIFICANT INTERVAL INCREASE IN SIZE OF THE RIGHT LOWER LOBE TUMOR SINCE JANUARY 25 WITH STRAIGHT LATERAL PATHOLOGIC FRACTURES OF RIGHT RIBS 7 AND 8 LIKELY SECONDARY TO CHEST WALL INVASION. Ct Chest Abd Pelv W Cont    Result Date: 3/5/2018  CT of the Chest, Abdomen, and Pelvis INDICATION:  Follow-up lung cancer Multiple axial images were obtained through the chest, abdomen, and pelvis after intravenous injection of 125cc of optiray 350. Radiation dose reduction techniques were used for this study: All CT scans performed at this facility use one or all of the following: Automated exposure control, adjustment of the mA and/or kVp according to patient's size, iterative reconstruction. Compared with 12/04/2017. FINDINGS: Chest CT: The right lower lobe lung mass continues to increase in size, now 7.0 x 6.3 cm compared with 5.6 x 5.0 cm on the prior study. The mass abuts and may involve the right chest wall. There are fractures of 2 adjacent right ribs. There are no other pulmonary masses. There is no pleural effusion. There is no significant adenopathy. There are no bony lesions. Abdomen CT: There is a new 15 mm low-attenuation mass in the inferior right lobe of the liver consistent with metastatic disease. There are no other liver lesions. There are several gallstones. The adrenal glands and pancreas appear normal.  There is normal enhancement of the kidneys. There is no hydronephrosis. There is no adenopathy. There are no inflammatory changes or fluid collections in the abdomen.  Pelvis CT:  There are no inflammatory changes or fluid collections in the pelvis. There is no significant adenopathy or mass. There are no bony lesions. IMPRESSION:  1. Enlarging right lung mass consistent with primary lung cancer. 2.  New metastatic lesion in the inferior right lobe of the liver, segment 6. TUMOR STATUS:  Progression    IMPRESSION:  Víctor Hickman is a 81 y.o. 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 She received initially stereotactic radiation but failed locally along with a secondary site within the lung. She has been treated with systemic chemotherapy but unfortunately has had side effects and progression. Most recently she expands pathologic fracture of the right seventh and eighth ribs secondary to chest wall invasion of her tumor. As a result, I was asked to see her to consider palliative radiotherapy to this area. Despite having had prior radiation, I feel there is still a role for palliation of symptoms and don't feel the morbidity of reirradiation would outweigh the benefit of a 2 week course of treatment. Therefore I would plan to treat the area with 3000 cGy in 10 fractions which will not interfere with systemic therapy. She understood the logistics of treatment, side effects, and benefits, and informed consent was obtained. We'll proceed with simulation at [place] today with treatment to begin Monday. PLAN:    1) Patient is progressing with local chest wall invasion of tumor causing pathologic fracture of the right seventh and eighth ribs. 2) Consent obtained for palliative radiotherapy, 3000 cGy plan to be delivered in 10 fractions. 3) Simulation planned for today at [place] with treatment to begin Monday. Portions of this note were copied from prior encounters and reviewed for accuracy, currency, and represent documentation and tasks completed during this encounter. I verify and attest these portions to be unchanged from prior visits.     Neal Romero MD  03/21/18

## 2018-05-15 NOTE — IP AVS SNAPSHOT
303 [facility] 
 
 
 2329 [facility] 322 [facility] 
[phone] Patient: Arlen Andrea MRN: [id number] :1945 About your hospitalization You were admitted on:  May 16, 2018 You last received care in the:  [facility] 6 MED SURG You were discharged on:  May 20, 2018 Why you were hospitalized Your primary diagnosis was:  Pneumonia Your diagnoses also included:  Copd Exacerbation (Hcc), Acquired Hypothyroidism, Non-Small Cell Lung Cancer (Hcc), Chronic Respiratory Failure With Hypoxia (Hcc), Anxiety, Pancytopenia (Hcc), Sepsis (Hcc), Chronic Pain, Current Chronic Use Of Systemic Steroids Follow-up Information Follow up With Details Comments Contact [place] [id number] [address] [facility] [address] 
[phone] [facility] Call in 2 weeks Call Monday and request 2 week appt [facility] 300 [facility] [address] [place] [address] [phone] Yassine Williamson MD Call in 2 weeks Call Monday and request 3-5 day appointment [address] [facility] [place] [address] 
[phone] Your Scheduled Appointments Tuesday May 29, 2018  1:30 PM EDT  
LAB with [address]  
4192 [facility] INSURANCE ([address]) Andres Teague [address] [place] [address]  
[phone] Tuesday May 29, 2018  2:00 PM EDT Follow Up with Alison Harrington MD  
[facility] Hematology and Oncology [facility] JAGDISH/ Roland Cook [address] [place] [id number]  
[phone] Discharge Orders None A check costa indicates which time of day the medication should be taken. My Medications START taking these medications Instructions Each Dose to Equal  
 Morning Noon Evening Bedtime  
 aspirin delayed-release 81 mg tablet Replaces:  aspirin 81 mg Cpdr  
   
 Take 1 Tab by mouth daily. 81 mg  
    
  
   
   
   
  
 doxycycline 100 mg capsule Commonly known as:  VIBRAMYCIN Take 1 Cap by mouth every twelve (12) hours for 5 days. 100 mg  
    
  
   
   
  
   
  
 morphine 10 mg/5 mL oral solution Take 1.25 mL by mouth every eight (8) hours as needed (severe dyspnea). Max Daily Amount: 7.5 mg.  
 2.5 mg  
    
   
   
   
  
 Saccharomyces boulardii 250 mg capsule Commonly known as:  Feliz Nadine Take 1 Cap by mouth two (2) times a day for 7 days. 250 mg CHANGE how you take these medications Instructions Each Dose to Equal  
 Morning Noon Evening Bedtime  
 guaiFENesin  mg ER tablet Commonly known as:  Feliz & Feliz What changed:   
- how much to take - when to take this Take 2 Tabs by mouth two (2) times a day. 1200 mg  
    
  
   
   
  
   
  
 * predniSONE 5 mg tablet Commonly known as:  Praveena Powell What changed:  Another medication with the same name was changed. Make sure you understand how and when to take each. Notes to Patient:  Start after you finish taper Take 1 Tab by mouth daily (with breakfast). 5 mg * predniSONE 10 mg tablet Commonly known as:  Praveena Powell What changed:   
- medication strength 
- additional instructions 2 tabs po qd x 3 days, 1 and 1/2 tabs po qd x 3 days, 1 tab po qd x 3 days, then resume 5mg tablets daily. * Notice: This list has 2 medication(s) that are the same as other medications prescribed for you. Read the directions carefully, and ask your doctor or other care provider to review them with you. CONTINUE taking these medications Instructions Each Dose to Equal  
 Morning Noon Evening Bedtime * albuterol 90 mcg/actuation inhaler Commonly known as:  PROVENTIL HFA, VENTOLIN HFA, PROAIR HFA  
   
 Take 2 Puffs by inhalation every four (4) hours as needed for Wheezing. Indications: Chronic Obstructive Pulmonary Disease 2 Puff * albuterol 2.5 mg /3 mL (0.083 %) nebulizer solution Commonly known as:  PROVENTIL VENTOLIN  
   
 3 mL by Nebulization route every four (4) hours as needed for Wheezing or Shortness of Breath (cough). COPD, lung Ca, Medicare 4  
 2.5 mg  
    
   
   
   
  
 AMBIEN 10 mg tablet Generic drug:  zolpidem Take 10 mg by mouth nightly as needed. 10 mg  
    
   
   
   
  
 CeleXA 40 mg tablet Generic drug:  citalopram  
   
 Take 40 mg by mouth daily. 40 mg  
    
  
   
   
   
  
 CRESTOR 20 mg tablet Generic drug:  rosuvastatin Take  by mouth. 1/2 tablet daily  
     
   
   
   
  
  
 fluticasone-vilanterol 200-25 mcg/dose inhaler Commonly known as:  BREO ELLIPTA Take 1 Puff by inhalation daily. RINSE MOUTH WELL AFTER USE  
 1 Puff HYDROcodone-acetaminophen  mg tablet Commonly known as:  Janas [place] Take 0.5-1 Tabs by mouth every six (6) hours as needed for Pain. Max Daily Amount: 4 Tabs. 0.5-1 Tab HYDROcodone-homatropine 5-1.5 mg/5 mL (5 mL) syrup Commonly known as:  HYCODAN Take 5 mL by mouth four (4) times daily. Max Daily Amount: 20 mL. 5 mL  
    
  
   
  
   
  
   
  
  
 lidocaine-prilocaine topical cream  
Commonly known as:  EMLA Apply  to affected area as needed. Apply to port site 30 minutes to 1 hour prior to port access LORazepam 1 mg tablet Commonly known as:  ATIVAN Your last dose was:  5/19 at 830 pm  
   
 Take 0.5 Tabs by mouth every six (6) hours as needed for Anxiety. Max Daily Amount: 2 mg. 0.5 mg  
    
   
   
   
  
 omeprazole 40 mg capsule Commonly known as:  PRILOSEC Take 1 Cap by mouth daily. 40 mg  
    
  
   
   
   
  
 oxyCODONE IR 10 mg Tab immediate release tablet Commonly known as:  Earl Melinda Take 1 Tab by mouth every three (3) hours as needed. Max Daily Amount: 80 mg.  
 10 mg OXYGEN-AIR DELIVERY SYSTEMS  
   
 by Does Not Apply route nightly. 3 lpm cont. potassium chloride 20 mEq tablet Commonly known as:  K-DUR KLOR-CON Take 1 Tab by mouth daily. 20 mEq SYNTHROID 88 mcg tablet Generic drug:  levothyroxine Take  by mouth Daily (before breakfast). * Notice: This list has 2 medication(s) that are the same as other medications prescribed for you. Read the directions carefully, and ask your doctor or other care provider to review them with you. STOP taking these medications   
 aspirin 81 mg Cpdr  
Replaced by:  aspirin delayed-release 81 mg tablet Where to Get Your Medications These medications were sent to [facility] [address], [address] [place] [address] Phone:  [phone]  
  predniSONE 10 mg tablet Information on where to get these meds will be given to you by the nurse or doctor. ! Ask your nurse or doctor about these medications  
  aspirin delayed-release 81 mg tablet  
 doxycycline 100 mg capsule  
 guaiFENesin  mg ER tablet  
 morphine 10 mg/5 mL oral solution Saccharomyces boulardii 250 mg capsule Opioid Education Prescription Opioids: What You Need to Know: 
 
Prescription opioids can be used to help relieve moderate-to-severe pain and are often prescribed following a surgery or injury, or for certain health conditions. These medications can be an important part of treatment but also come with serious risks. Opioids are strong pain medicines. Examples include hydrocodone, oxycodone, fentanyl, and morphine. Heroin is an example of an illegal opioid.   It is important to work with your health care provider to make sure you are getting the safest, most effective care. WHAT ARE THE RISKS AND SIDE EFFECTS OF OPIOID USE? Prescription opioids carry serious risks of addiction and overdose, especially with prolonged use. An opioid overdose, often marked by slow breathing, can cause sudden death. The use of prescription opioids can have a number of side effects as well, even when taken as directed. · Tolerance-meaning you might need to take more of a medication for the same pain relief · Physical dependence-meaning you have symptoms of withdrawal when the medication is stopped. Withdrawal symptoms can include nausea, sweating, chills, diarrhea, stomach cramps, and muscle aches. Withdrawal can last up to several weeks, depending on which drug you took and how long you took it. · Increased sensitivity to pain · Constipation · Nausea, vomiting, and dry mouth · Sleepiness and dizziness · Confusion · Depression · Low levels of testosterone that can result in lower sex drive, energy, and strength · Itching and sweating RISKS ARE GREATER WITH:      
· History of drug misuse, substance use disorder, or overdose · Mental health conditions (such as depression or anxiety) · Sleep apnea · Older age (72 years or older) · Pregnancy Avoid alcohol while taking prescription opioids. Also, unless specifically advised by your health care provider, medications to avoid include: · Benzodiazepines (such as Xanax or Valium) · Muscle relaxants (such as Soma or Flexeril) · Hypnotics (such as Ambien or Lunesta) · Other prescription opioids KNOW YOUR OPTIONS Talk to your health care provider about ways to manage your pain that don't involve prescription opioids. Some of these options may actually work better and have fewer risks and side effects. Options may include: 
· Pain relievers such as acetaminophen, ibuprofen, and naproxen · Some medications that are also used for depression or seizures · Physical therapy and exercise · Counseling to help patients learn how to cope better with triggers of pain and stress. · Application of heat or cold compress · Massage therapy · Relaxation techniques Be Informed Make sure you know the name of your medication, how much and how often to take it, and its potential risks & side effects. IF YOU ARE PRESCRIBED OPIOIDS FOR PAIN: 
· Never take opioids in greater amounts or more often than prescribed. Remember the goal is not to be pain-free but to manage your pain at a tolerable level. · Follow up with your primary care provider to: · Work together to create a plan on how to manage your pain. · Talk about ways to help manage your pain that don't involve prescription opioids. · Talk about any and all concerns and side effects. · Help prevent misuse and abuse. · Never sell or share prescription opioids · Help prevent misuse and abuse. · Store prescription opioids in a secure place and out of reach of others (this may include visitors, children, friends, and family). · Safely dispose of unused/unwanted prescription opioids: Find your community drug take-back program or your pharmacy mail-back program, or flush them down the toilet, following guidance from the Food and Drug Administration (www.fda.gov/Drugs/ResourcesForYou). · Visit www.cdc.gov/drugoverdose to learn about the risks of opioid abuse and overdose. · If you believe you may be struggling with addiction, tell your health care provider and ask for guidance or call [address] [place], [place] [address]Imnish at [phone]. Discharge Instructions Chronic Obstructive Pulmonary Disease (COPD): Care Instructions Your Care Instructions Chronic obstructive pulmonary disease (COPD) is a general term for a group of lung diseases, including emphysema and chronic bronchitis. People with COPD have decreased airflow in and out of the lungs, which makes it hard to breathe. The airways also can get clogged with thick mucus. Cigarette smoking is a major cause of COPD. Although there is no cure for COPD, you can slow its progress. Following your treatment plan and taking care of yourself can help you feel better and live longer. Follow-up care is a key part of your treatment and safety. Be sure to make and go to all appointments, and call your doctor if you are having problems. It's also a good idea to know your test results and keep a list of the medicines you take. How can you care for yourself at home? ?Staying healthy ? · Do not smoke. This is the most important step you can take to prevent more damage to your lungs. If you need help quitting, talk to your doctor about stop-smoking programs and medicines. These can increase your chances of quitting for good. ? · Avoid colds and flu. Get a pneumococcal vaccine shot. If you have had one before, ask your doctor whether you need a second dose. Get the flu vaccine every fall. If you must be around people with colds or the flu, wash your hands often. ? · Avoid secondhand smoke, air pollution, and high altitudes. Also avoid cold, dry air and hot, humid air. Stay at home with your windows closed when air pollution is bad. ?Medicines and oxygen therapy ? · Take your medicines exactly as prescribed. Call your doctor if you think you are having a problem with your medicine. ? · You may be taking medicines such as: ¨ Bronchodilators. These help open your airways and make breathing easier. Bronchodilators are either short-acting (work for 6 to 9 hours) or long-acting (work for 24 hours). You inhale most bronchodilators, so they start to act quickly. Always carry your quick-relief inhaler with you in case you need it while you are away from home. ¨ Corticosteroids (prednisone, budesonide). These reduce airway inflammation. They come in pill or inhaled form. You must take these medicines every day for them to work well. ? · A spacer may help you get more inhaled medicine to your lungs. Ask your doctor or pharmacist if a spacer is right for you. If it is, ask how to use it properly. ? · Do not take any vitamins, over-the-counter medicine, or herbal products without talking to your doctor first.  
? · If your doctor prescribed antibiotics, take them as directed. Do not stop taking them just because you feel better. You need to take the full course of antibiotics. ? · Oxygen therapy boosts the amount of oxygen in your blood and helps you breathe easier. Use the flow rate your doctor has recommended, and do not change it without talking to your doctor first.  
Activity ? · Get regular exercise. Walking is an easy way to get exercise. Start out slowly, and walk a little more each day. ? · Pay attention to your breathing. You are exercising too hard if you cannot talk while you are exercising. ? · Take short rest breaks when doing household chores and other activities. ? · Learn breathing methods-such as breathing through pursed lips-to help you become less short of breath. ? · If your doctor has not set you up with a pulmonary rehabilitation program, talk to him or her about whether rehab is right for you. Rehab includes exercise programs, education about your disease and how to manage it, help with diet and other changes, and emotional support. Diet ? · Eat regular, healthy meals. Use bronchodilators about 1 hour before you eat to make it easier to eat. Eat several small meals instead of three large ones. Drink beverages at the end of the meal. Avoid foods that are hard to chew. ? · Eat foods that contain protein so that you do not lose muscle mass. ? · Talk with your doctor if you gain too much weight or if you lose weight without trying. ?Mental health ? · Talk to your family, friends, or a therapist about your feelings. It is normal to feel frightened, angry, hopeless, helpless, and even guilty. Talking openly about bad feelings can help you cope. If these feelings last, talk to your doctor. When should you call for help? Call 911 anytime you think you may need emergency care. For example, call if: 
? · You have severe trouble breathing. ?Call your doctor now or seek immediate medical care if: 
? · You have new or worse trouble breathing. ? · You cough up blood. ? · You have a fever. ? Watch closely for changes in your health, and be sure to contact your doctor if: 
? · You cough more deeply or more often, especially if you notice more mucus or a change in the color of your mucus. ? · You have new or worse swelling in your legs or belly. ? · You are not getting better as expected. Where can you learn more? Go to http://jose-katarzyna.info/. Marlee Gone in the search box to learn more about \"Chronic Obstructive Pulmonary Disease (COPD): Care Instructions. \" Current as of: May 12, 2017 Content Version: 11.4 © 7700-2008 [employer]. Care instructions adapted under license by [employer] (which disclaims liability or warranty for this information). If you have questions about a medical condition or this instruction, always ask your healthcare professional. [place] [address] any warranty or liability for your use of this information. Pneumonia: Care Instructions Your Care Instructions Pneumonia is an infection of the lungs. Most cases are caused by infections from bacteria or viruses. Pneumonia may be mild or very severe. If it is caused by bacteria, you will be treated with antibiotics. It may take a few weeks to a few months to recover fully from pneumonia, depending on how sick you were and whether your overall health is good. Follow-up care is a key part of your treatment and safety. Be sure to make and go to all appointments, and call your doctor if you are having problems. It's also a good idea to know your test results and keep a list of the medicines you take. How can you care for yourself at home? · Take your antibiotics exactly as directed. Do not stop taking the medicine just because you are feeling better. You need to take the full course of antibiotics. · Take your medicines exactly as prescribed. Call your doctor if you think you are having a problem with your medicine. · Get plenty of rest and sleep. You may feel weak and tired for a while, but your energy level will improve with time. · To prevent dehydration, drink plenty of fluids, enough so that your urine is light yellow or clear like water. Choose water and other caffeine-free clear liquids until you feel better. If you have kidney, heart, or liver disease and have to limit fluids, talk with your doctor before you increase the amount of fluids you drink. · Take care of your cough so you can rest. A cough that brings up mucus from your lungs is common with pneumonia. It is one way your body gets rid of the infection. But if coughing keeps you from resting or causes severe fatigue and chest-wall pain, talk to your doctor. He or she may suggest that you take a medicine to reduce the cough. · Use a vaporizer or humidifier to add moisture to your bedroom. Follow the directions for cleaning the machine. · Do not smoke or allow others to smoke around you. Smoke will make your cough last longer. If you need help quitting, talk to your doctor about stop-smoking programs and medicines. These can increase your chances of quitting for good. · Take an over-the-counter pain medicine, such as acetaminophen (Tylenol), ibuprofen (Advil, Motrin), or naproxen (Aleve). Read and follow all instructions on the label. · Do not take two or more pain medicines at the same time unless the doctor told you to. Many pain medicines have acetaminophen, which is Tylenol. Too much acetaminophen (Tylenol) can be harmful. · If you were given a spirometer to measure how well your lungs are working, use it as instructed. This can help your doctor tell how your recovery is going. · To prevent pneumonia in the future, talk to your doctor about getting a flu vaccine (once a year) and a pneumococcal vaccine (one time only for most people). When should you call for help? Call 911 anytime you think you may need emergency care. For example, call if: 
? · You have severe trouble breathing. ?Call your doctor now or seek immediate medical care if: 
? · You cough up dark brown or bloody mucus (sputum). ? · You have new or worse trouble breathing. ? · You are dizzy or lightheaded, or you feel like you may faint. ? Watch closely for changes in your health, and be sure to contact your doctor if: 
? · You have a new or higher fever. ? · You are coughing more deeply or more often. ? · You are not getting better after 2 days (48 hours). ? · You do not get better as expected. Where can you learn more? Go to http://jose-katarzyna.info/. Enter [id number] in the search box to learn more about \"Pneumonia: Care Instructions. \" Current as of: May 12, 2017 Content Version: 11.4 © 1995-6424 [employer]. Care instructions adapted under license by [employer] (which disclaims liability or warranty for this information). If you have questions about a medical condition or this instruction, always ask your healthcare professional. [place] [address] any warranty or liability for your use of this information. DISCHARGE SUMMARY from Nurse PATIENT INSTRUCTIONS: 
 
After general anesthesia or intravenous sedation, for 24 hours or while taking prescription Narcotics: · Limit your activities · Do not drive and operate hazardous machinery · Do not make important personal or business decisions · Do  not drink alcoholic beverages · If you have not urinated within 8 hours after discharge, please contact your surgeon on call. Report the following to your surgeon: 
· Excessive pain, swelling, redness or odor of or around the surgical area · Temperature over 100.5 · Nausea and vomiting lasting longer than 4 hours or if unable to take medications · Any signs of decreased circulation or nerve impairment to extremity: change in color, persistent  numbness, tingling, coldness or increase pain · Any questions What to do at Home: *  Please give a list of your current medications to your Primary Care Provider. *  Please update this list whenever your medications are discontinued, doses are 
    changed, or new medications (including over-the-counter products) are added. *  Please carry medication information at all times in case of emergency situations. These are general instructions for a healthy lifestyle: No smoking/ No tobacco products/ Avoid exposure to second hand smoke Surgeon General's Warning:  Quitting smoking now greatly reduces serious risk to your health. Obesity, smoking, and sedentary lifestyle greatly increases your risk for illness A healthy diet, regular physical exercise & weight monitoring are important for maintaining a healthy lifestyle You may be retaining fluid if you have a history of heart failure or if you experience any of the following symptoms:  Weight gain of 3 pounds or more overnight or 5 pounds in a week, increased swelling in our hands or feet or shortness of breath while lying flat in bed. Please call your doctor as soon as you notice any of these symptoms; do not wait until your next office visit. Recognize signs and symptoms of STROKE: 
 
F-face looks uneven A-arms unable to move or move unevenly S-speech slurred or non-existent T-time-call 911 as soon as signs and symptoms begin-DO NOT go Back to bed or wait to see if you get better-TIME IS BRAIN. Warning Signs of HEART ATTACK Call 911 if you have these symptoms: 
? Chest discomfort. Most heart attacks involve discomfort in the center of the chest that lasts more than a few minutes, or that goes away and comes back. It can feel like uncomfortable pressure, squeezing, fullness, or pain. ? Discomfort in other areas of the upper body. Symptoms can include pain or discomfort in one or both arms, the back, neck, jaw, or stomach. ? Shortness of breath with or without chest discomfort. ? Other signs may include breaking out in a cold sweat, nausea, or lightheadedness. Don't wait more than five minutes to call [address]! Fast action can save your life. Calling 911 is almost always the fastest way to get lifesaving treatment. Emergency Medical Services staff can begin treatment when they arrive  up to an hour sooner than if someone gets to the hospital by car. The discharge information has been reviewed with the patient. The patient verbalized understanding. Discharge medications reviewed with the patient and appropriate educational materials and side effects teaching were provided. ___________________________________________________________________________________________________________________________________ ACO Transitions of Care Introducing [employer] [address] Sainz offers a voluntary care coordination program to provide high quality service and care to [facility] fee-for-service beneficiaries. Sera Floyd was designed to help you enhance your health and well-being through the following services: ? Transitions of Care  support for individuals who are transitioning from one care setting to another (example: Hospital to home). ? Chronic and Complex Care Coordination  support for individuals and caregivers of those with serious or chronic illnesses or with more than one chronic (ongoing) condition and those who take a number of different medications. If you meet specific medical criteria, a [facility] may call you directly to coordinate your care with your primary care physician and your other care providers. For questions about the [facility] programs, please, contact your physicians office. For general questions or additional information about Accountable Care Organizations: 
Please visit www.medicare.gov/acos. html or call 1-800-MEDICARE (7-703.852.9098) TTY users should call 1-890.123.6066. Introducing [facility]! Dear Kerrie Sandoval: Thank you for requesting a [employer] account. Our records indicate that you already have an active [employer] account. You can access your account anytime at [email or web address] Did you know that you can access your hospital and ER discharge instructions at any time in [employer]? You can also review all of your test results from your hospital stay or ER visit. Additional Information If you have questions, please visit the Frequently Asked Questions section of the [employer] website at [email or web address] Remember, [employer] is NOT to be used for urgent needs. For medical emergencies, dial 911. Now available from your iPhone and Android! Introducing Driss Aguilar As a [facility] patient, I wanted to make you aware of our electronic visit tool called Driss Aguilar. [facility] 24/7 allows you to connect within minutes with a medical provider 24 hours a day, seven days a week via a mobile device or tablet or logging into a secure website from your computer. You can access Driss Aguilar from anywhere in the [place]. A virtual visit might be right for you when you have a simple condition and feel like you just dont want to get out of bed, or cant get away from work for an appointment, when your regular [employer] provider is not available (evenings, weekends or holidays), or when youre out of town and need minor care. Electronic visits cost only $49 and if the [employer] 24/7 provider determines a prescription is needed to treat your condition, one can be electronically transmitted to a nearby pharmacy*. Please take a moment to enroll today if you have not already done so. The enrollment process is free and takes just a few minutes. To enroll, please download the [employer] 24/7 marian to your tablet or phone, or visit [email or web address] to enroll on your computer. And, as an [address] [place] [address] patient with a [employer] account, the results of your visits will be scanned into your electronic medical record and your primary care provider will be able to view the scanned results. We urge you to continue to see your regular [employer] provider for your ongoing medical care. And while your primary care provider may not be the one available when you seek a [employer] virtual visit, the peace of mind you get from getting a real diagnosis real time can be priceless. For more information on [employer], view our Frequently Asked Questions (FAQs) at [email or web address]. Sincerely, 
 
Iqra Holcomb MD 
Chief Medical Officer [employer] *:  certain medications cannot be prescribed via [employer] Unresulted Labs-Please follow up with your PCP about these lab tests Order Current Status CULTURE, BLOOD Preliminary result CULTURE, BLOOD Preliminary result Providers Seen During Your Hospitalization Provider Specialty Primary office phone Padmini Toscano MD Emergency Medicine [phone] Tino Burkett MD Internal Medicine [phone] Immunizations Administered for This Admission Name Date  
 TB Skin Test (PPD) Intradermal 5/16/2018 Your Primary Care Physician (PCP) Primary Care Physician Office Phone Office Fax Leah Harrington [phone] You are allergic to the following Allergen Reactions Iodinated Contrast- Oral And Iv Dye Nausea and Vomiting  
    
 Sulfa (Sulfonamide Antibiotics) Other (comments) Skin burning, nausea vomiting Zofran (Ondansetron Hcl (Pf)) Nausea and Vomiting Recent Documentation Height Weight Breastfeeding? BMI OB Status Smoking Status 1.626 m 68.6 kg No 25.95 kg/m2 Postmenopausal Former Smoker Emergency Contacts Name Discharge Info Relation Home Work Mobile Harley Winter DISCHARGE CAREGIVER [3] Spouse [3] [phone] Patient Belongings The following personal items are in your possession at time of discharge: 
  Dental Appliances: None  Visual Aid: None      Home Medications: None   Jewelry: None  Clothing: None    Other Valuables: None Please provide this summary of care documentation to your next provider. Signatures-by signing, you are acknowledging that this After Visit Summary has been reviewed with you and you have received a copy. Patient Signature:  ____________________________________________________________ Date:  ____________________________________________________________  
  
Maciel Foster Provider Signature:  ____________________________________________________________ Date:  ____________________________________________________________

## 2018-05-16 PROBLEM — F41.9 ANXIETY: Chronic | Status: ACTIVE | Noted: 2018-01-01

## 2018-05-16 PROBLEM — G89.29 CHRONIC PAIN: Chronic | Status: ACTIVE | Noted: 2018-01-01

## 2018-05-16 PROBLEM — J44.1 COPD EXACERBATION (HCC): Status: ACTIVE | Noted: 2018-01-01

## 2018-05-16 PROBLEM — C34.90 NON-SMALL CELL LUNG CANCER (HCC): Chronic | Status: ACTIVE | Noted: 2018-01-01

## 2018-05-16 PROBLEM — E03.9 ACQUIRED HYPOTHYROIDISM: Chronic | Status: ACTIVE | Noted: 2018-01-01

## 2018-05-16 PROBLEM — D61.818 PANCYTOPENIA (HCC): Chronic | Status: ACTIVE | Noted: 2018-01-01

## 2018-05-16 PROBLEM — Z79.52 CURRENT CHRONIC USE OF SYSTEMIC STEROIDS: Chronic | Status: ACTIVE | Noted: 2018-01-01

## 2018-05-16 PROBLEM — J96.11 CHRONIC RESPIRATORY FAILURE WITH HYPOXIA (HCC): Chronic | Status: ACTIVE | Noted: 2018-01-01

## 2018-05-16 PROBLEM — J18.9 PNEUMONIA: Status: ACTIVE | Noted: 2018-01-01

## 2018-05-16 PROBLEM — A41.9 SEPSIS (HCC): Status: ACTIVE | Noted: 2018-01-01

## 2018-05-16 NOTE — PROGRESS NOTES
Hospitalist Progress Note    2018  Admit Date: 5/15/2018 10:58 PM   NAME: Alisa Lynch   :     MRN:  [id number]   Attending: Rod Sharma MD  PCP:  Tonja Rebolledo MD    SUBJECTIVE:   Patient 72F with pmhx of NSCLC follows with oncology. S/p rtx and chemo. Chemo stopped d/t disease progression, liver mets. Hx of non hodgkins lymphoma in . COPD Gold stage IV follows with [facility]. Presents with cough, increasing dyspnea, fevers and chest pain. ED workup notable for CXR with RLL mass. CT CAP 3/2018 showed enlarging right lung mass with liver met. Temp of 103 on arrival. Admitted for sepsis r/t pneumonia and COPD exacerbation. Started on IV steroids, IV atbx. Pulm consulted.  - \"i feel a little better but still feel pretty horrible.  I can't move without getting short of breath\"      Review of Systems negative with exception of pertinent positives noted above  PHYSICAL EXAM     Visit Vitals    BP 99/52 (BP 1 Location: Right arm, BP Patient Position: At rest)    Pulse 77    Temp 98.1 °F (36.7 °C)    Resp 20    Ht 5' 4\" (1.626 m)    Wt 63 kg (139 lb)    SpO2 97%    Breastfeeding No    BMI 23.86 kg/m2      Temp (24hrs), Av.7 °F (37.6 °C), Min:98.1 °F (36.7 °C), Max:102.3 °F (39.1 °C)    Oxygen Therapy  O2 Sat (%): 97 % (18 1533)  Pulse via Oximetry: 86 beats per minute (18 1533)  O2 Device: Nasal cannula (18 1533)  O2 Flow Rate (L/min): 4 l/min (18 1533)  FIO2 (%): 36 % (18 0448)  ETCO2 (mmHg): 4 mmHg (18 0131)  No intake or output data in the 24 hours ending 18 1729   General: No acute distress    Lungs:  Diffuse rhonchi, exp wheezing  Heart:  Regular rate and rhythm,  No murmur, rub, or gallop  Abdomen: Soft, Non distended, Non tender, Positive bowel sounds  Extremities: No cyanosis, clubbing or edema  Neurologic:  No focal deficits    ASSESSMENT      Active Hospital Problems    Diagnosis Date Noted    COPD exacerbation ([facility] 05/16/2018    Pneumonia 05/16/2018    Chronic respiratory failure with hypoxia ([facility] 05/16/2018    Anxiety 05/16/2018    Pancytopenia ([facility] 05/16/2018    Sepsis ([facility] 05/16/2018    Chronic pain 05/16/2018    Current chronic use of systemic steroids 05/16/2018    Acquired hypothyroidism 05/15/2018    Non-small cell lung cancer ([facility] 05/15/2018     A/p  - Sepsis - secondary to pneumonia in setting of lung ca. Follow blood cultures. Cont vanco/zosyn D2  - COPD exacerbation - IV steroids, BD's. Symptom mgmt with prn morphine/ativan  - anxiety - prn ativan. Cont home meds  - Chronic pain - prn opiods  - Pancytopenia - monitor daily.  Transfuse prn      DVT Prophylaxis: lovenox sq    Signed By: Marilynn Anthony DO     May 16, 2018

## 2018-05-16 NOTE — PROGRESS NOTES
05/16/18 1234   Dual Skin Pressure Injury Assessment   Dual Skin Pressure Injury Assessment WDL  (skin dry and fragile.   No breakdown)

## 2018-05-16 NOTE — PROGRESS NOTES
made initial visit. Pt was alert and verbal with no pain level expressed or observed. Pt's daughter was present for visit.  welcomed them to DT and shared information about  services.  provided spiritual care through presence, pastoral conversation, and assurance of prayer.

## 2018-05-16 NOTE — PROGRESS NOTES
TRANSFER - IN REPORT:    Verbal report received from Kole jameson on [address]  being received from er(unit) for routine progression of care      Report consisted of patients Situation, Background, Assessment and   Recommendations(SBAR). Information from the following report(s) SBAR, Kardex, ED Summary, Intake/Output, MAR, Recent Results and Med Rec Status was reviewed with the receiving nurse. Opportunity for questions and clarification was provided. Assessment completed upon patients arrival to unit and care assumed.

## 2018-05-16 NOTE — H&P
Hospitalist H&P Note     Admit Date:  5/15/2018 10:58 PM   Name:  Bj Enriquez   Age:  81 y.o.  :  1945   MRN:  [id number]   PCP:  Fantasma Neely MD  Treatment Team: Primary Nurse: Lucius Palacios    HPI:     CC: dyspnea and fever       Ms. Piper Hernandez is a 82 yo female with PMH of non small cell lung cancer followed by oncology Dr. Samantha Brady and treated with radiation, 2017 squamous cell lung cancer managed with chemo and stopped due to disease progression/ liver mets,  diagnosis of non-Hodgkin's lymphoma, COPD followed by pulmonary Dr. Eugenie Glasgow with chronic hypoxic respiratory failure on 4 L NC evaluated with dyspnea and fever. She admits to troubles breathing several days, cough with minimal sputum, and chest wall pain, she is anxious and presently feels like she is unable to breathe well. CXR shows RLL mass (personally reviewed) , CT chest/ ABD/pelvis 3,2018 showed enlarging right lung mass with liver met. .3, and has received vancomycin and zosyn in the ED.- meets sepsis criteria due to fever and leukopenia. Additional history is obtained from friend Mali Unger at bedside     10 systems reviewed and negative except as noted in HPI. - has blurred vision, weight loss, constipation, joint pain, headaches, paresthesia, skin changes, mood changes,         Past Medical History:   Diagnosis Date    Acquired hypothyroidism 2016    Cancer ([facility]     non-hodgkins lymphoma, lung ca     COPD     Hypercholesterolemia     Other ill-defined conditions(799.89)      hypothyroid    Sepsis ([facility] 2018    Symptomatic cholelithiasis 2016    Unspecified sleep apnea     uses o2 at night      Past Surgical History:   Procedure Laterality Date    HX OTHER SURGICAL      removed mass from right elbow    HX VASCULAR ACCESS        cyberknife      Allergies   Allergen Reactions    Iodinated Contrast- Oral And Iv Dye Nausea and Vomiting    Sulfa (Sulfonamide Antibiotics) Other (comments)     Skin burning, nausea vomiting    Zofran [Ondansetron Hcl (Pf)] Nausea and Vomiting      Social History   Substance Use Topics    Smoking status: Former Smoker     Packs/day: 1.00     Years: 48.00     Types: Cigarettes     Quit date: 8/21/2015    Smokeless tobacco: Never Used    Alcohol use No      Family History   Problem Relation Age of Onset    Other Mother      lupus, breast ca    Other Father      DM    Other Maternal Aunt      ovarian ca      Immunization History   Administered Date(s) Administered    Influenza Vaccine 09/22/2014, 10/16/2015, 08/01/2016, 09/01/2017    Influenza Vaccine (Quad) 09/02/2016    Influenza Vaccine (Quad) Ped PF 11/10/2017    Pneumococcal Conjugate (PCV-13) 09/22/2014, 10/16/2015    Pneumococcal Vaccine (Unspecified Type) 01/01/2014    TB Skin Test (PPD) 01/01/2015     PTA Medications:  Prior to Admission Medications   Prescriptions Last Dose Informant Patient Reported? Taking? HYDROcodone-acetaminophen (NORCO)  mg tablet   No No   Sig: Take 0.5-1 Tabs by mouth every six (6) hours as needed for Pain. Max Daily Amount: 4 Tabs. HYDROcodone-homatropine (HYCODAN) 5-1.5 mg/5 mL (5 mL) syrup   No No   Sig: Take 5 mL by mouth four (4) times daily. Max Daily Amount: 20 mL. LORazepam (ATIVAN) 1 mg tablet   No No   Sig: Take 0.5 Tabs by mouth every six (6) hours as needed for Anxiety. Max Daily Amount: 2 mg. OXYGEN-AIR DELIVERY SYSTEMS   Yes No   Sig: by Does Not Apply route nightly. 3 lpm cont. albuterol (PROVENTIL HFA, VENTOLIN HFA, PROAIR HFA) 90 mcg/actuation inhaler   No No   Sig: Take 2 Puffs by inhalation every four (4) hours as needed for Wheezing. Indications: Chronic Obstructive Pulmonary Disease   albuterol (PROVENTIL VENTOLIN) 2.5 mg /3 mL (0.083 %) nebulizer solution   No No   Sig: 3 mL by Nebulization route every four (4) hours as needed for Wheezing or Shortness of Breath (cough).  COPD, lung Ca, Medicare 4   aspirin 81 mg CpDR   Yes No Sig: Take 1 Tab by mouth daily. citalopram (CELEXA) 40 mg tablet   Yes No   Sig: Take 40 mg by mouth daily. fluticasone-vilanterol (BREO ELLIPTA) 200-25 mcg/dose inhaler   No No   Sig: Take 1 Puff by inhalation daily. RINSE MOUTH WELL AFTER USE   guaiFENesin ER (MUCINEX) 600 mg ER tablet   Yes No   Sig: Take 600 mg by mouth daily. levothyroxine (SYNTHROID) 88 mcg tablet   Yes No   Sig: Take  by mouth Daily (before breakfast). lidocaine-prilocaine (EMLA) topical cream   No No   Sig: Apply  to affected area as needed. Apply to port site 30 minutes to 1 hour prior to port access   mirtazapine (REMERON) 15 mg tablet   No No   Sig: Take 1 Tab by mouth nightly. omeprazole (PRILOSEC) 40 mg capsule   No No   Sig: Take 1 Cap by mouth daily. ondansetron hcl (ZOFRAN) 8 mg tablet   No No   Sig: Take 1 Tab by mouth every eight (8) hours as needed for Nausea. oxyCODONE IR (ROXICODONE) 10 mg tab immediate release tablet   No No   Sig: Take 1 Tab by mouth every three (3) hours as needed. Max Daily Amount: 80 mg.   potassium chloride (K-DUR, KLOR-CON) 20 mEq tablet   No No   Sig: Take 1 Tab by mouth daily. predniSONE (DELTASONE) 10 mg tablet   No No   Sig: Take 0.5 Tabs by mouth daily (with breakfast). predniSONE (DELTASONE) 10 mg tablet   No No   Sig: Take 1 Tab by mouth daily (with breakfast). Take 40 mg daily on Days 1 and 2, Take 30 mg daily on Days 3 and 4, Take 20 mg daily on Days 5 and 6, Take 10 mg daily on Days 7 and 8.   predniSONE (DELTASONE) 5 mg tablet   No No   Sig: Take 1 Tab by mouth daily (with breakfast). predniSONE (DELTASONE) 50 mg tablet   No No   Sig: Take 1 tab 13 hours prior to procedure, Take 1 tab 7 hours prior to procedure, Take 1 tab 1 hour prior to procedure with 50mg of Benadryl. rosuvastatin (CRESTOR) 20 mg tablet   Yes No   Sig: Take  by mouth. 1/2 tablet daily   zolpidem (AMBIEN) 10 mg tablet   Yes No   Sig: Take 10 mg by mouth nightly as needed.         Facility-Administered Medications: None       Objective:     Patient Vitals for the past 24 hrs:   Temp Pulse Resp BP SpO2   05/16/18 0138 (!) 102.3 °F (39.1 °C) - - - -   05/16/18 0131 - (!) 104 24 147/65 96 %   05/16/18 0120 - - - - 95 %   05/16/18 0116 - 95 17 134/67 98 %   05/16/18 0101 - 100 20 136/57 98 %   05/15/18 2314 - - 28 - -   05/15/18 2304 100.1 °F (37.8 °C) (!) 111 16 163/77 92 %     Oxygen Therapy  O2 Sat (%): 96 % (05/16/18 0131)  Pulse via Oximetry: 104 beats per minute (05/16/18 0131)  O2 Device: Nasal cannula (05/16/18 0131)  O2 Flow Rate (L/min): 4 l/min (05/16/18 0120)  ETCO2 (mmHg): 2 mmHg (05/16/18 0131)  No intake or output data in the 24 hours ending 05/16/18 0259    Physical Exam:  General:    Well nourished. Alert. No distress, anxious   Eyes:   Normal sclera. Extraocular movements intact. PERRLA  ENT:  Normocephalic, atraumatic. Moist mucous membranes  CV:   RRR. No m/r/g. No edema  Lungs:  Diffuse wheezing  Abdomen: Soft, nontender, nondistended. Present BS  Extremities: Warm and dry. No cyanosis or edema. Neurologic:  grossly intact. Skin:     No rashes or jaundice. Normal coloration  Psych:  Normal mood and affect. I reviewed the labs, imaging, EKGs, telemetry, and other studies done this admission. Data Review:   Recent Results (from the past 24 hour(s))   CBC WITH AUTOMATED DIFF    Collection Time: 05/16/18 12:00 AM   Result Value Ref Range    WBC 3.9 (L) 4.3 - 11.1 K/uL    RBC 3.32 (L) 4.05 - 5.25 M/uL    HGB 11.5 (L) 11.7 - 15.4 g/dL    HCT 35.9 35.8 - 46.3 %    .1 (H) 79.6 - 97.8 FL    MCH 34.6 (H) 26.1 - 32.9 PG    MCHC 32.0 31.4 - 35.0 g/dL    RDW 16.1 (H) 11.9 - 14.6 %    PLATELET 278 (L) 643 - 450 K/uL    MPV 8.8 (L) 10.8 - 14.1 FL    DF AUTOMATED      NEUTROPHILS 76 43 - 78 %    LYMPHOCYTES 14 13 - 44 %    MONOCYTES 9 4.0 - 12.0 %    EOSINOPHILS 1 0.5 - 7.8 %    BASOPHILS 0 0.0 - 2.0 %    IMMATURE GRANULOCYTES 0 0.0 - 5.0 %    ABS. NEUTROPHILS 3.0 1.7 - 8.2 K/UL    ABS. LYMPHOCYTES 0.6 0.5 - 4.6 K/UL    ABS. MONOCYTES 0.3 0.1 - 1.3 K/UL    ABS. EOSINOPHILS 0.0 0.0 - 0.8 K/UL    ABS. BASOPHILS 0.0 0.0 - 0.2 K/UL    ABS. IMM. GRANS. 0.0 0.0 - 0.5 K/UL   METABOLIC PANEL, COMPREHENSIVE    Collection Time: 05/16/18 12:00 AM   Result Value Ref Range    Sodium 139 136 - 145 mmol/L    Potassium 3.9 3.5 - 5.1 mmol/L    Chloride 101 98 - 107 mmol/L    CO2 36 (H) 21 - 32 mmol/L    Anion gap 2 (L) 7 - 16 mmol/L    Glucose 113 (H) 65 - 100 mg/dL    BUN 8 8 - 23 MG/DL    Creatinine 0.84 0.6 - 1.0 MG/DL    GFR est AA >60 >60 ml/min/1.73m2    GFR est non-AA >60 >60 ml/min/1.73m2    Calcium 8.6 8.3 - 10.4 MG/DL    Bilirubin, total 0.4 0.2 - 1.1 MG/DL    ALT (SGPT) 11 (L) 12 - 65 U/L    AST (SGOT) 15 15 - 37 U/L    Alk.  phosphatase 77 50 - 136 U/L    Protein, total 6.5 6.3 - 8.2 g/dL    Albumin 3.0 (L) 3.2 - 4.6 g/dL    Globulin 3.5 2.3 - 3.5 g/dL    A-G Ratio 0.9 (L) 1.2 - 3.5     PROCALCITONIN    Collection Time: 05/16/18 12:00 AM   Result Value Ref Range    Procalcitonin <0.1 ng/mL   POC TROPONIN-I    Collection Time: 05/16/18 12:04 AM   Result Value Ref Range    Troponin-I (POC) 0.01 (L) 0.02 - 0.05 ng/ml   POC LACTIC ACID    Collection Time: 05/16/18 12:05 AM   Result Value Ref Range    Lactic Acid (POC) 0.8 0.5 - 1.9 mmol/L   BLOOD GAS, ARTERIAL    Collection Time: 05/16/18  1:18 AM   Result Value Ref Range    pH 7.46 (H) 7.35 - 7.45      PCO2 45 35 - 45 mmHg    PO2 106 (H) 80 - 105 mmHg    BICARBONATE 31 (H) 22 - 26 mmol/L    BASE EXCESS 6.3 (H) 0 - 3 mmol/L    TOTAL HEMOGLOBIN 11.0 (L) 11.7 - 15.0 GM/DL    O2 SAT 98 92 - 98.5 %    Arterial O2 Hgb 95.9 94 - 97 %    CARBOXYHEMOGLOBIN 1.1 0.5 - 1.5 %    METHEMOGLOBIN 0.5 0.0 - 1.5 %    DEOXYHEMOGLOBIN 3 0.0 - 5.0 %    SITE RB     ALLENS TEST NA     MODE NC     O2 FLOW 4.00 L/min   URINE MICROSCOPIC    Collection Time: 05/16/18  1:45 AM   Result Value Ref Range    WBC 0-3 0 /hpf    RBC 3-5 0 /hpf    Epithelial cells 3-5 0 /hpf    Bacteria 0 0 /hpf    Casts 0-3 0 /lpf       All Micro Results     Procedure Component Value Units Date/Time    CULTURE, BLOOD [[id number]] Collected:  05/16/18 0000    Order Status:  Completed Specimen:  Blood from Blood Updated:  05/16/18 0232    CULTURE, BLOOD [[id number]] Collected:  05/16/18 0020    Order Status:  Completed Specimen:  Blood from Blood Updated:  05/16/18 0231          Other Studies:  Xr Chest Port    Result Date: 5/15/2018  Clinical history: Chest pain. TECHNIQUE: AP portable chest COMPARISON: April 25, 2018 FINDINGS: There is a large mass in the right lower lobe, similar to prior exam. No consolidation in the left lung. No pneumothorax or pulmonary edema. No large pleural effusion. Cardiac mediastinal contour is within normal limits. Bones are osteopenic. Stable right-sided chest port. IMPRESSION: 1. No evidence of acute pulmonary disease.  2. Right lower lobe mass, similar to prior exam.      Assessment and Plan:     Hospital Problems as of 5/16/2018  Date Reviewed: 5/1/2018          Codes Class Noted - Resolved POA    COPD exacerbation ([facility] ICD-10-CM: J44.1  ICD-9-CM: 491.21  5/16/2018 - Present Unknown        Pneumonia ICD-10-CM: J18.9  ICD-9-CM: 251  5/16/2018 - Present Yes        Chronic respiratory failure with hypoxia (HCC) (Chronic) ICD-10-CM: J96.11  ICD-9-CM: 518.83, 799.02  5/16/2018 - Present Yes        Anxiety (Chronic) ICD-10-CM: F41.9  ICD-9-CM: 300.00  5/16/2018 - Present Yes        Pancytopenia (HCC) (Chronic) ICD-10-CM: L19.441  ICD-9-CM: 284.19  5/16/2018 - Present Yes        * (Principal)Sepsis ([facility] ICD-10-CM: A41.9  ICD-9-CM: 038.9, 995.91  5/16/2018 - Present Yes        Chronic pain (Chronic) ICD-10-CM: K80.38  ICD-9-CM: 338.29  5/16/2018 - Present Yes        Current chronic use of systemic steroids (Chronic) ICD-10-CM: Z79.52  ICD-9-CM: V58.65  5/16/2018 - Present Yes        Acquired hypothyroidism ICD-10-CM: E03.9  ICD-9-CM: 244.9  12/30/2016 - Present Yes        Non-small cell lung cancer ([facility] (Chronic) ICD-10-CM: C34.90  ICD-9-CM: 162.9  8/28/2015 - Present Yes                · Sepsis: likely due to pneumonia in the setting of known lung cancer, followup blood cultures, continue vancomycin and zosyn, EKG ordered  · COPD exacerbation with chronic hypoxic respiratory failure: continue albuterol, pulmicort, supplemental O2 at 4 L NC baseline, morphine prn for dyspnea, IV solumedrol to taper as tolerant, pulomonary consult  · Anxiety: continue home ativan dosing prn and chronic antidepressants  · Chronic pain: continue opioid medications as needed  · Pancytopenia: will followup daily CBC    Discharge planning:  PPD, PT/OT/case management for discharge needs  DVT ppx: Lovenox   Code status:  Full  Estimated LOS:  Greater than 2 midnights  Risk:  High, use of IV morphine,  Care plan: melanie wheeler, [phone], friend   Signed:  Janice Layton MD

## 2018-05-16 NOTE — PROGRESS NOTES
Pt still short of breathe with any exertion. Taking respiratory treatments as ordered. Morphine 2 mg given q4h for comfort per pt and md request.  Ativan given prn. Family at bs and tearful about pt's overall condition.

## 2018-05-16 NOTE — CONSULTS
CONSULT NOTE    Erica Ramachandran    5/16/2018    Date of Admission:  5/15/2018    The patient's chart is reviewed and the patient is discussed with the staff. Subjective:     Patient is a 81 y.o.  female seen and evaluated at the request of Dr. Conrado Joe. She presented to the ER with cough, shortness of breath, low grade fevers and chest pain. She has non small cell lung cancer diagnosed 2015, followed by Dr. Brenda Mcclain and has been treated with radiation and chem that was stopped due to disease progression including liver mets. CXR shows RLL mass, CT chest/ ABD/pelvis 3/2018 showed enlarging right lung mass with liver met. WBC 3.9 and procal <0.1  .3, and has received vancomycin and zosyn in the [facility] 4L O2 dependent COPD (GOLD stage IV) has been treated for non-Hodgkin's lymphoma in 2008. Was admitted with sepsis pneumonia, continued on nebs and started on Zosyn. We have been asked to assist with treatment of COPD exacerbation. She was seen in our office 4/25/18 and was continued on Prednisone 5 mg daily and was DNR at that time with hospice discussion but was not ready for hospice at that time. Currently sitting up in bed leaning forward and states is having difficulty breathing despite O2 sat 97%--on home flow 4L. Was febrile this morning with 102.3 temp and feels she \"cannot get the air in\". Is receiving Zosyn for antibiotic coverage, IV steroids and nebs.     Review of Systems  A comprehensive review of systems was negative except for: Constitutional: positive for fevers, fatigue and malaise  Respiratory: positive for cough, sputum, dyspnea on exertion or COPD, Lung cancer  Gastrointestinal: positive for reflux symptoms  Behvioral/Psych: positive for bad mood and depression  Endocrine: positive for hypothyroidism    Patient Active Problem List   Diagnosis Code    Non-small cell lung cancer ([facility] [address] C34.90    COPD (chronic obstructive pulmonary disease) ([facility] [address] J44.9    Hypoxemia R09.02    Chest pain R07.9    Palpitation R00.2    Acquired hypothyroidism E03.9    Symptomatic cholelithiasis K80.20    Nausea & vomiting R11.2    Nausea R11.0    Hypokalemia E87.6    COPD exacerbation ([facility]) J44.1    Pneumonia J18.9    Chronic respiratory failure with hypoxia ([facility]) J96.11    Anxiety F41.9    Pancytopenia ([facility]) D61.818    Sepsis ([facility]) A41.9    Chronic pain G89.29    Current chronic use of systemic steroids Z79.52       Home O2 4L    Prior to Admission Medications   Prescriptions Last Dose Informant Patient Reported? Taking? HYDROcodone-acetaminophen (NORCO)  mg tablet   No Yes   Sig: Take 0.5-1 Tabs by mouth every six (6) hours as needed for Pain. Max Daily Amount: 4 Tabs. HYDROcodone-homatropine (HYCODAN) 5-1.5 mg/5 mL (5 mL) syrup   No No   Sig: Take 5 mL by mouth four (4) times daily. Max Daily Amount: 20 mL. LORazepam (ATIVAN) 1 mg tablet   No Yes   Sig: Take 0.5 Tabs by mouth every six (6) hours as needed for Anxiety. Max Daily Amount: 2 mg. OXYGEN-AIR DELIVERY SYSTEMS   Yes Yes   Sig: by Does Not Apply route nightly. 3 lpm cont. albuterol (PROVENTIL HFA, VENTOLIN HFA, PROAIR HFA) 90 mcg/actuation inhaler   No Yes   Sig: Take 2 Puffs by inhalation every four (4) hours as needed for Wheezing. Indications: Chronic Obstructive Pulmonary Disease   albuterol (PROVENTIL VENTOLIN) 2.5 mg /3 mL (0.083 %) nebulizer solution   No Yes   Sig: 3 mL by Nebulization route every four (4) hours as needed for Wheezing or Shortness of Breath (cough). COPD, lung Ca, Medicare 4   citalopram (CELEXA) 40 mg tablet   Yes Yes   Sig: Take 40 mg by mouth daily. fluticasone-vilanterol (BREO ELLIPTA) 200-25 mcg/dose inhaler   No Yes   Sig: Take 1 Puff by inhalation daily. RINSE MOUTH WELL AFTER USE   guaiFENesin ER (MUCINEX) 600 mg ER tablet   Yes Yes   Sig: Take 600 mg by mouth daily.    levothyroxine (SYNTHROID) 88 mcg tablet   Yes Yes   Sig: Take  by mouth Daily (before breakfast). lidocaine-prilocaine (EMLA) topical cream   No No   Sig: Apply  to affected area as needed. Apply to port site 30 minutes to 1 hour prior to port access   omeprazole (PRILOSEC) 40 mg capsule   No Yes   Sig: Take 1 Cap by mouth daily. oxyCODONE IR (ROXICODONE) 10 mg tab immediate release tablet   No Yes   Sig: Take 1 Tab by mouth every three (3) hours as needed. Max Daily Amount: 80 mg.   potassium chloride (K-DUR, KLOR-CON) 20 mEq tablet   No Yes   Sig: Take 1 Tab by mouth daily. predniSONE (DELTASONE) 5 mg tablet   No Yes   Sig: Take 1 Tab by mouth daily (with breakfast). predniSONE (DELTASONE) 50 mg tablet   No Yes   Sig: Take 1 tab 13 hours prior to procedure, Take 1 tab 7 hours prior to procedure, Take 1 tab 1 hour prior to procedure with 50mg of Benadryl. rosuvastatin (CRESTOR) 20 mg tablet   Yes Yes   Sig: Take  by mouth. 1/2 tablet daily   zolpidem (AMBIEN) 10 mg tablet   Yes Yes   Sig: Take 10 mg by mouth nightly as needed.         Facility-Administered Medications: None       Past Medical History:   Diagnosis Date    Acquired hypothyroidism 12/30/2016    Cancer ([facility] [address]     non-hodgkins lymphoma, lung ca     Cancer of lung ([facility] [address]     liver mets    Chronic pain     COPD     Hypercholesterolemia     Other ill-defined conditions(799.89)      hypothyroid    Sepsis ([facility] [address] 5/16/2018    Symptomatic cholelithiasis 12/30/2016    Unspecified sleep apnea     uses o2 at night     Past Surgical History:   Procedure Laterality Date    HX OTHER SURGICAL      removed mass from right elbow    HX VASCULAR ACCESS       Social History     Social History    Marital status:      Spouse name: N/A    Number of children: N/A    Years of education: N/A     Occupational History    unknown      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     PPD negative in 2012             Family History   Problem Relation Age of Onset    Other Mother      lupus, breast ca    Other Father      DM    Other Maternal Aunt      ovarian ca     Allergies   Allergen Reactions    Iodinated Contrast- Oral And Iv Dye Nausea and Vomiting    Sulfa (Sulfonamide Antibiotics) Other (comments)     Skin burning, nausea vomiting    Zofran [Ondansetron Hcl (Pf)] Nausea and Vomiting       Current Facility-Administered Medications   Medication Dose Route Frequency    morphine 10 mg/mL injection 2 mg  2 mg IntraVENous Q4H PRN    albuterol (PROVENTIL VENTOLIN) nebulizer solution 2.5 mg  2.5 mg Nebulization Q6H RT    aspirin delayed-release tablet 81 mg  81 mg Oral DAILY    citalopram (CELEXA) tablet 40 mg  40 mg Oral DAILY    guaiFENesin ER (MUCINEX) tablet 1,200 mg  1,200 mg Oral BID    levothyroxine (SYNTHROID) tablet 88 mcg  88 mcg Oral ACB    LORazepam (ATIVAN) tablet 0.5 mg  0.5 mg Oral Q6H PRN    mirtazapine (REMERON) tablet 15 mg  15 mg Oral QHS    pantoprazole (PROTONIX) tablet 40 mg  40 mg Oral ACB    rosuvastatin (CRESTOR) tablet 10 mg  10 mg Oral QHS    zolpidem (AMBIEN) tablet 5 mg  5 mg Oral QHS PRN    sodium chloride (NS) flush 5-10 mL  5-10 mL IntraVENous Q8H    sodium chloride (NS) flush 5-10 mL  5-10 mL IntraVENous PRN    acetaminophen (TYLENOL) tablet 650 mg  650 mg Oral Q6H PRN    oxyCODONE-acetaminophen (PERCOCET 7.5) 7.5-325 mg per tablet 1 Tab  1 Tab Oral Q4H PRN    naloxone (NARCAN) injection 0.4 mg  0.4 mg IntraVENous PRN    ondansetron (ZOFRAN) injection 4 mg  4 mg IntraVENous Q4H PRN    enoxaparin (LOVENOX) injection 40 mg  40 mg SubCUTAneous Q24H    budesonide (PULMICORT) 500 mcg/2 ml nebulizer suspension  500 mcg Nebulization BID RT    methylPREDNISolone (PF) (SOLU-MEDROL) injection 40 mg  40 mg IntraVENous Q12H    hydrALAZINE (APRESOLINE) 20 mg/mL injection 10 mg  10 mg IntraVENous Q6H PRN    tuberculin injection 5 Units  5 Units IntraDERMal ONCE    piperacillin-tazobactam (ZOSYN) 4.5 g in 0.9% sodium chloride (MBP/ADV) 100 mL  4.5 g IntraVENous Q8H    phenol throat spray (CHLORASEPTIC) 1 Spray  1 Spray Oral PRN         Objective:     Vitals:    05/16/18 0600 05/16/18 0900 05/16/18 1058 05/16/18 1234   BP:   137/78 98/53   Pulse:   95 67   Resp:   22 24   Temp:   99.7 °F (37.6 °C) 98.1 °F (36.7 °C)   SpO2: 96% 94% 95% 97%   Weight:       Height:           PHYSICAL EXAM     Constitutional:  the patient is well developed and in no acute distress, NC 4L sat 97%  EENMT:  Sclera clear, pupils equal, oral mucosa moist  Respiratory: few crackles, diminished breath sounds  Cardiovascular:  RRR without M,G,R  Gastrointestinal: soft and non-tender; with positive bowel sounds. Musculoskeletal: warm without cyanosis. There is no lower leg edema. Skin:  no jaundice or rashes, no wounds   Neurologic: no gross neuro deficits     Psychiatric:  alert and oriented x 3    CXR:  None today    CXR 5/15/18:          Recent Labs      05/16/18   0000   WBC  3.9*   HGB  11.5*   HCT  35.9   PLT  144*     Recent Labs      05/16/18   0000   NA  139   K  3.9   CL  101   GLU  113*   CO2  36*   BUN  8   CREA  0.84   CA  8.6   ALB  3.0*   TBILI  0.4   ALT  11*   SGOT  15     Recent Labs      05/16/18   0118   PH  7.46*   PCO2  45   PO2  106*   HCO3  31*     No results for input(s): LCAD, LAC in the last 72 hours.     Assessment:  (Medical Decision Making)     Hospital Problems  Date Reviewed: 5/16/2018          Codes Class Noted POA    COPD exacerbation ([facility] [address] ICD-10-CM: J44.1  ICD-9-CM: 491.21  5/16/2018 Yes    continue current treatment    * (Principal)Pneumonia ICD-10-CM: J18.9  ICD-9-CM: 952  5/16/2018 Yes    Continue Zosyn    Chronic respiratory failure with hypoxia ([facility]) (Chronic) ICD-10-CM: J96.11  ICD-9-CM: 518.83, 799.02  5/16/2018 Yes    On home O2 flow    Anxiety (Chronic) ICD-10-CM: F41.9  ICD-9-CM: 300.00  5/16/2018 Yes    chronic    Pancytopenia (H[facility]) (Chronic) ICD-10-CM: W10.232  ICD-9-CM: 284.19  5/16/2018 Yes    unchanged    Sepsis ([facility] ICD-10-CM: A41.9  ICD-9-CM: 038.9, 995.91  5/16/2018 Yes    continue current    Chronic pain (Chronic) ICD-10-CM: G86.45  ICD-9-CM: 338.29  5/16/2018 Yes    chronic    Current chronic use of systemic steroids (Chronic) ICD-10-CM: Z79.52  ICD-9-CM: V58.65  5/16/2018 Yes    Was on Prednisone 5 mg daily prior to admission    Non-small cell lung cancer ([facility] (Chronic) ICD-10-CM: C34.90  ICD-9-CM: 162.9  5/15/2018 Yes    chronic    Acquired hypothyroidism (Chronic) ICD-10-CM: E03.9  ICD-9-CM: 244.9  5/15/2018 Yes    Chronic--On supplement          Plan:  (Medical Decision Making)     --Albuterol, Pulmicort, Mucinex--Increase frequently Albuterol and add PRN  --Morphine PRN  for work of breathing  --Solu Medrol 40mg q12h  --Zosyn day 2  --Blood cultures:  Pending  --No further chemotherapy planned  --Has been DNR in discussions with Dr. Brenda Mcclain and Dr. Klaudia Peter    More than 50% of the time documented was spent in face-to-face contact with the patient and in the care of the patient on the floor/unit where the patient is located. Thank you very much for this referral.  We appreciate the opportunity to participate in this patient's care. Will follow along with above stated plan. Conrado Rivers, NP   I have spoken with and examined the patient. I agree with the above assessment and plan as documented.     Gen: pleasant, no distress,  Lungs:  Wheezing on left  Heart:  RRR with no Murmur/Rubs/Gallops  Ext: no edema    --Agree with aggressive treatment of COPD exacerbation and fever in immunocompromised patient  --Increase steroids  --Add IV narcotic if needed for dyspnea  --discussed code status formally with patient and she is going to discuss with her family today.     Kavya Braswell MD

## 2018-05-16 NOTE — ED NOTES
Verbal report from Raymon Sanders, [address] [facility]. Transfer of care at this time.

## 2018-05-16 NOTE — ED PROVIDER NOTES
HPI Comments: Patient is a 68-year-old female with lung cancer, COPD and oxygen dependence presenting with increased shortness of breath cough and chest pain for about 2 days. She is noted to have a low-grade fever in triage. Describes pain as primarily left-sided. no reports of nausea, vomiting, abdominal pain. Patient followed by dr Jason Salazar for oncology as well as  for pulmonary. Reports she tried to breathing treatments at home but did not help significantly. Reports some radiation of pain to her left arm. Patient is a 81 y.o. female presenting with shortness of breath and chest pain. The history is provided by the patient. No  was used. Shortness of Breath   Associated symptoms include a fever, cough and chest pain. Pertinent negatives include no headaches, no sore throat, no vomiting, no abdominal pain, no rash and no leg swelling. Chest Pain (Angina)    Associated symptoms include cough, a fever and shortness of breath. Pertinent negatives include no abdominal pain, no back pain, no headaches, no nausea and no vomiting.         Past Medical History:   Diagnosis Date    Acquired hypothyroidism 12/30/2016    Cancer ([facility] [address]     non-hodgkins lymphoma, lung ca     COPD     Hypercholesterolemia     Other ill-defined conditions(799.89)      hypothyroid    Symptomatic cholelithiasis 12/30/2016    Unspecified sleep apnea     uses o2 at night       Past Surgical History:   Procedure Laterality Date    HX OTHER SURGICAL      removed mass from right elbow    HX VASCULAR ACCESS           Family History:   Problem Relation Age of Onset    Other Mother      lupus, breast ca    Other Father      DM    Other Maternal Aunt      ovarian ca       Social History     Social History    Marital status:      Spouse name: N/A    Number of children: N/A    Years of education: N/A     Occupational History    unknown      [employer]     Social History Main Topics  Smoking status: Former Smoker     Packs/day: 1.00     Years: 48.00     Types: Cigarettes     Quit date: 8/21/2015    Smokeless tobacco: Never Used    Alcohol use No    Drug use: No    Sexual activity: Yes     Partners: Male     Other Topics Concern    Not on file     Social History Narrative    Former beautician, 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 PPD negative in 2012                 ALLERGIES: Iodinated contrast- oral and iv dye; Sulfa (sulfonamide antibiotics); and Zofran [ondansetron hcl (pf)]    Review of Systems   Constitutional: Positive for fatigue and fever. HENT: Negative for sore throat. Respiratory: Positive for cough and shortness of breath. Negative for chest tightness. Cardiovascular: Positive for chest pain. Negative for leg swelling. Gastrointestinal: Negative for abdominal pain, nausea and vomiting. Genitourinary: Negative for dysuria. Musculoskeletal: Negative for back pain. Skin: Negative for rash. Neurological: Negative for syncope and headaches. Psychiatric/Behavioral: Negative for confusion. Vitals:    05/15/18 2304 05/15/18 2314   BP: 163/77    Pulse: (!) 111    Resp: 16 28   Temp: 100.1 °F (37.8 °C)    SpO2: 92%    Weight: 63 kg (139 lb)    Height: 5' 4\" (1.626 m)             Physical Exam   Constitutional: She is oriented to person, place, and time. She appears well-developed and well-nourished. No distress. HENT:   Head: Normocephalic and atraumatic. Eyes: Conjunctivae and EOM are normal. Pupils are equal, round, and reactive to light. Neck: Normal range of motion. Neck supple. Cardiovascular: Normal rate, regular rhythm and normal heart sounds. Pulmonary/Chest: Breath sounds normal. She is in respiratory distress. She has no wheezes. She has no rales. pursed lip breathing. Significant wheezing appreciated in bilateral lung fields. Perhaps worse wheezing on the left. Abdominal: Soft. She exhibits no distension. There is no tenderness.  There is no rebound. Musculoskeletal: Normal range of motion. She exhibits no edema or tenderness. Neurological: She is alert and oriented to person, place, and time. Skin: Skin is warm and dry. No rash noted. She is not diaphoretic. Psychiatric: She has a normal mood and affect. Her behavior is normal.   Vitals reviewed. MDM  Number of Diagnoses or Management Options  Sepsis, due to unspecified organism [facility]): new and requires workup  SOB (shortness of breath): new and requires workup  Diagnosis management comments: Patient meeting sepsis criteria tachycardia, fever and white blood cell count below 4. I believe her chest x-ray does demonstrate some infiltrate in the left. Symptoms seem to be respiratory at this time. She improved somewhat with albuterol. We'll admit to hospitalist service for further evaluation and treatment. Broad-spectrum antibiotics started in the ED. admitted in stable condition. Sylvester Phillips MD; 5/16/2018 @1:43 AM Voice dictation software was used during the making of this note. This software is not perfect and grammatical and other typographical errors may be present.   This note has not been proofread for errors.  ===================================================================         Amount and/or Complexity of Data Reviewed  Clinical lab tests: ordered and reviewed (Results for orders placed or performed during the hospital encounter of 05/15/18  -CBC WITH AUTOMATED DIFF       Result                                            Value                         Ref Range                       WBC                                               3.9 (L)                       4.3 - 11.1 K/uL                 RBC                                               3.32 (L)                      4.05 - 5.25 M/uL                HGB                                               11.5 (L)                      11.7 - 15.4 g/dL                HCT                                               35.9 35.8 - 46.3 %                   MCV                                               108.1 (H)                     79.6 - 97.8 FL                  MCH                                               34.6 (H)                      26.1 - 32.9 PG                  MCHC                                              32.0                          31.4 - 35.0 g/dL                RDW                                               16.1 (H)                      11.9 - 14.6 %                   PLATELET                                          144 (L)                       150 - 450 K/uL                  MPV                                               8.8 (L)                       10.8 - 14.1 FL                  DF                                                AUTOMATED                                                     NEUTROPHILS                                       76                            43 - 78 %                       LYMPHOCYTES                                       14                            13 - 44 %                       MONOCYTES                                         9                             4.0 - 12.0 %                    EOSINOPHILS                                       1                             0.5 - 7.8 %                     BASOPHILS                                         0                             0.0 - 2.0 %                     IMMATURE GRANULOCYTES                             0                             0.0 - 5.0 %                     ABS. NEUTROPHILS                                  3.0                           1.7 - 8.2 K/UL                  ABS. LYMPHOCYTES                                  0.6                           0.5 - 4.6 K/UL                  ABS. MONOCYTES                                    0.3                           0.1 - 1.3 K/UL                  ABS.  EOSINOPHILS                                  0.0                           0.0 - 0.8 K/UL ABS. BASOPHILS                                    0.0                           0.0 - 0.2 K/UL                  ABS. IMM.  GRANS.                                  0.0                           0.0 - 0.5 K/UL             -METABOLIC PANEL, COMPREHENSIVE       Result                                            Value                         Ref Range                       Sodium                                            139                           136 - 145 mmol/L                Potassium                                         3.9                           3.5 - 5.1 mmol/L                Chloride                                          101                           98 - 107 mmol/L                 CO2                                               36 (H)                        21 - 32 mmol/L                  Anion gap                                         2 (L)                         7 - 16 mmol/L                   Glucose                                           113 (H)                       65 - 100 mg/dL                  BUN                                               8                             8 - 23 MG/DL                    Creatinine                                        0.84                          0.6 - 1.0 MG/DL                 GFR est AA                                        >60                           >60 ml/min/1.73m2               GFR est non-AA                                    >60                           >60 ml/min/1.73m2               Calcium                                           8.6                           8.3 - 10.4 MG/DL                Bilirubin, total                                  0.4                           0.2 - 1.1 MG/DL                 ALT (SGPT)                                        11 (L)                        12 - 65 U/L                     AST (SGOT)                                        15                            15 - 37 U/L                     Alk. phosphatase                                  77                            50 - 136 U/L                    Protein, total                                    6.5                           6.3 - 8.2 g/dL                  Albumin                                           3.0 (L)                       3.2 - 4.6 g/dL                  Globulin                                          3.5                           2.3 - 3.5 g/dL                  A-G Ratio                                         0.9 (L)                       1.2 - 3.5                  -PROCALCITONIN       Result                                            Value                         Ref Range                       Procalcitonin                                     <0.1                          ng/mL                      -BLOOD GAS, ARTERIAL       Result                                            Value                         Ref Range                       pH                                                7.46 (H)                      7.35 - 7.45                     PCO2                                              45                            35 - 45 mmHg                    PO2                                               106 (H)                       80 - 105 mmHg                   BICARBONATE                                       31 (H)                        22 - 26 mmol/L                  BASE EXCESS                                       6.3 (H)                       0 - 3 mmol/L                    TOTAL HEMOGLOBIN                                  11.0 (L)                      11.7 - 15.0 GM/DL               O2 SAT                                            98                            92 - 98.5 %                     Arterial O2 Hgb                                   95.9                          94 - 97 %                       CARBOXYHEMOGLOBIN                                 1.1                           0.5 - 1.5 % METHEMOGLOBIN                                     0.5                           0.0 - 1.5 %                     DEOXYHEMOGLOBIN                                   3                             0.0 - 5.0 %                     SITE                                              RB                                                            ALLENS TEST                                       NA                                                            MODE                                              NC                                                            O2 FLOW                                           4.00                          L/min                      -POC LACTIC ACID       Result                                            Value                         Ref Range                       Lactic Acid (POC)                                 0.8                           0.5 - 1.9 mmol/L           -POC TROPONIN-I       Result                                            Value                         Ref Range                       Troponin-I (POC)                                  0.01 (L)                      0.02 - 0.05 ng/ml        ')  Tests in the radiology section of CPT®: ordered and reviewed (Xr Chest Port    Result Date: 5/15/2018  Clinical history: Chest pain. TECHNIQUE: AP portable chest COMPARISON: April 25, 2018 FINDINGS: There is a large mass in the right lower lobe, similar to prior exam. No consolidation in the left lung. No pneumothorax or pulmonary edema. No large pleural effusion. Cardiac mediastinal contour is within normal limits. Bones are osteopenic. Stable right-sided chest port. IMPRESSION: 1. No evidence of acute pulmonary disease.  2. Right lower lobe mass, similar to prior exam.    )  Review and summarize past medical records: yes  Independent visualization of images, tracings, or specimens: yes    Risk of Complications, Morbidity, and/or Mortality  Presenting problems: moderate  Diagnostic procedures: moderate  Management options: moderate    Patient Progress  Patient progress: stable        ED Course       Procedures

## 2018-05-16 NOTE — ED TRIAGE NOTES
Patient states that she is short of breath, has chest pain, and hurts under her arms. This started this morning and got worse throughout the day.

## 2018-05-17 NOTE — PROGRESS NOTES
Pt rounded on hourly during shift. Pt c/o pain during shift and given pain medication per MAR. No other needs stated. Will monitor until day shift RN.

## 2018-05-17 NOTE — PROGRESS NOTES
Problem: Mobility Impaired (Adult and Pediatric)  Goal: *Acute Goals and Plan of Care (Insert Text)  LTG:  (1.)Ms. Antonio Kay will move from supine to sit and sit to supine, scoot up and down and roll side to side INDEPENDENTLY with bed flat within 7 treatment day(s). (2.)Ms. Antonio Kay will transfer from bed to chair and chair to bed INDEPENDENTLY within 7 treatment day(s). (3.)Ms. Antonio Kay will ambulate INDEPENDENTLY for 150 feet within 7 treatment day(s). (4.)Ms. Antonio Kay will perform seated and/or standing exercises and functional activities with supervision to improve strength and mobility within 7 days. ________________________________________________________________________________________________    PHYSICAL THERAPY: Initial Assessment, AM 5/17/2018  INPATIENT: Hospital Day: 3  [place] / Product Type: Medicare /      NAME/AGE/GENDER: Magdy Elizondo is a 81 y.o. female   PRIMARY DIAGNOSIS: COPD exacerbation ([facility] [address] Pneumonia Pneumonia        ICD-10: Treatment Diagnosis:    · Generalized Muscle Weakness (M62.81)  · Difficulty in walking, Not elsewhere classified (R26.2)  · Other abnormalities of gait and mobility (R26.89)   Precaution/Allergies:  Iodinated contrast- oral and iv dye; Sulfa (sulfonamide antibiotics); and Zofran [ondansetron hcl (pf)]      ASSESSMENT:     Ms. Antonio Kay is a 81year old female admitted from home for COPD exacerbation and sepsis secondary to pneumonia. She lives at home with spouse (who has cancer) and son (who works full time) and is typically fairly independent and mobile around the house with o2 use and no DME. She presents sitting up in bed without complaints and is agreeable to therapy assessment. Transfers to sitting independent with head of bed elevated. Sit-stand transfers with supervision.  Pt navigates around room and bathroom with CGA-briefly min assist due to mild trunk sway and generalized anxiety related to her shortness of breath. Performs toilet transfers and standing ADLs with SBA then up to recliner afterwards. O2 sats initially 89% on 4L and increase to 94% in 1 minute. Positioned comfortably in chair with LEs elevated and needs in reach. She wants to go home today and asking about getting \"help at home\" with housekeeping, etc. Will pass concerns along to SW. Will continue with therapy during hospital stay to maximize safety and independence with mobility and address activity tolerance and energy conservation techniques. This section established at most recent assessment   PROBLEM LIST (Impairments causing functional limitations):  1. Decreased ADL/Functional Activities  2. Decreased Transfer Abilities  3. Decreased Ambulation Ability/Technique  4. Decreased Balance  5. Decreased Activity Tolerance  6. Decreased Pacing Skills  7. Increased Shortness of Breath   INTERVENTIONS PLANNED: (Benefits and precautions of physical therapy have been discussed with the patient.)  1. Balance Exercise  2. Bed Mobility  3. Gait Training  4. Therapeutic Activites  5. Therapeutic Exercise/Strengthening  6. Transfer Training  7. Group Therapy     TREATMENT PLAN: Frequency/Duration: 3 times a week for duration of hospital stay  Rehabilitation Potential For Stated Goals: Good     RECOMMENDED REHABILITATION/EQUIPMENT: (at time of discharge pending progress): Due to the probability of continued deficits (see above) this patient will likely need continued skilled physical therapy after discharge. Equipment:    None at this time              HISTORY:   History of Present Injury/Illness (Reason for Referral):  Per H&P, \"Ms. Kee Grimes is a 80 yo female with PMH of non small cell lung cancer followed by oncology Dr. Blanca Carr and treated with radiation, 2017 squamous cell lung cancer managed with chemo and stopped due to disease progression/ liver mets, 2008 diagnosis of non-Hodgkin's lymphoma, COPD followed by pulmonary Dr. Dane Martines with chronic hypoxic respiratory failure on 4 L NC evaluated with dyspnea and fever. She admits to troubles breathing several days, cough with minimal sputum, and chest wall pain, she is anxious and presently feels like she is unable to breathe well. CXR shows RLL mass (personally reviewed) , CT chest/ ABD/pelvis 3,2018 showed enlarging right lung mass with liver met. .3, and has received vancomycin and zosyn in the ED.- meets sepsis criteria due to fever and leukopenia. \"    Past Medical History/Comorbidities:   Ms. Ute Nguyen  has a past medical history of Acquired hypothyroidism (12/30/2016); Cancer ([address] Cancer of lung ([address] Chronic pain; COPD; Hypercholesterolemia; Other ill-defined conditions(799.89); Sepsis ([address] (5/16/2018); Symptomatic cholelithiasis (12/30/2016); and Unspecified sleep apnea. She also has no past medical history of Aneurysm ([address] Arrhythmia; Arthritis; Asthma; Autoimmune disease ([address] CAD (coronary artery disease); Chronic kidney disease; Coagulation disorder ([address] DEMENTIA; Diabetes ([address] Endocarditis; GERD (gastroesophageal reflux disease); Heart failure ([address] Hypertension; Liver disease; Morbid obesity ([address] Neurological disorder; Psychiatric disorder; PUD (peptic ulcer disease); Rheumatic fever; Seizures ([address] Sleep disorder; Stroke [facility]); or Thromboembolus ([address] Ms. Ute Nguyen  has a past surgical history that includes hx other surgical and hx vascular access. Social History/Living Environment:   Home Environment: Private residence  # Steps to Enter: 0  One/Two Story Residence: One story  Living Alone: No  Support Systems: Spouse/Significant Other/Partner, Child(fernando), Family member(s)  Patient Expects to be Discharged to[de-identified] Private residence  Current DME Used/Available at Home: Oxygen, portable, Shower chair  Tub or Shower Type: Shower  Prior Level of Function/Work/Activity:  [address] lives with  and son in single story home with 0 steps.  Pt performs mobility around house without any DME (does wear 4L O2 /) and uses cart for balance when out in community. She had 1 fall about 2 months ago, stating she \"blacked out\". Daughter assists with transportation and ADLs. Number of Personal Factors/Comorbidities that affect the Plan of Care: 1-2: MODERATE COMPLEXITY   EXAMINATION:   Most Recent Physical Functioning:   Gross Assessment:  AROM: Within functional limits  Strength: Generally decreased, functional  Coordination: Within functional limits               Posture:  Posture (WDL): Exceptions to WDL  Posture Assessment: Forward head, Rounded shoulders  Balance:  Sitting: Intact  Standing: Impaired  Standing - Static: Fair  Standing - Dynamic : Fair Bed Mobility:  Rolling: Independent  Supine to Sit: Independent  Scooting: Independent  Wheelchair Mobility:     Transfers:  Sit to Stand: Supervision  Stand to Sit: Supervision  Bed to Chair: Contact guard assistance  Gait:     Base of Support: Narrowed  Speed/Lien: Pace decreased (<100 feet/min)  Step Length: Left shortened;Right shortened  Gait Abnormalities: Trunk sway increased  Distance (ft): 15 Feet (ft)  Assistive Device:  (none)  Ambulation - Level of Assistance: Contact guard assistance;Minimal assistance  Interventions: Verbal cues; Safety awareness training      Body Structures Involved:  1. Lungs  2. Muscles Body Functions Affected:  1. Respiratory  2. Movement Related Activities and Participation Affected:  1. General Tasks and Demands  2. Mobility  3. Self Care  4. Domestic Life  5. Community, Social and [place]   Number of elements that affect the Plan of Care: 4+: HIGH COMPLEXITY   CLINICAL PRESENTATION:   Presentation: Stable and uncomplicated: LOW COMPLEXITY   CLINICAL DECISION MAKIN [facility] Inpatient Short Form  How much difficulty does the patient currently have. .. Unable A Lot A Little None   1.   Turning over in bed (including adjusting bedclothes, sheets and blankets)? [] 1   [] 2   [] 3   [x] 4   2. Sitting down on and standing up from a chair with arms ( e.g., wheelchair, bedside commode, etc.)   [] 1   [] 2   [] 3   [x] 4   3. Moving from lying on back to sitting on the side of the bed? [] 1   [] 2   [] 3   [x] 4   How much help from another person does the patient currently need. .. Total A Lot A Little None   4. Moving to and from a bed to a chair (including a wheelchair)? [] 1   [] 2   [x] 3   [] 4   5. Need to walk in hospital room? [] 1   [] 2   [x] 3   [] 4   6. Climbing 3-5 steps with a railing? [] 1   [x] 2   [] 3   [] 4   © 2007, Trustees of [address] [place] [id number], under license to [employer].. All rights reserved      Score:  Initial: 20 Most Recent: X (Date: -- )    Interpretation of Tool:  Represents activities that are increasingly more difficult (i.e. Bed mobility, Transfers, Gait). Score 24 23 22-20 19-15 14-10 9-7 6     Modifier CH CI CJ CK CL CM CN      ? Mobility - Walking and Moving Around:     - CURRENT STATUS: CJ - 20%-39% impaired, limited or restricted    - GOAL STATUS: CI - 1%-19% impaired, limited or restricted    - D/C STATUS:  ---------------To be determined---------------  [place] / [place] / Product Type: Medicare /      Medical Necessity:     · Patient demonstrates good rehab potential due to higher previous functional level. Reason for Services/Other Comments:  · Patient continues to demonstrate capacity to improve strength, balance, activity tolerance, mobility which will increase independence and increase safety.    Use of outcome tool(s) and clinical judgement create a POC that gives a: Clear prediction of patient's progress: LOW COMPLEXITY            TREATMENT:   (In addition to Assessment/Re-Assessment sessions the following treatments were rendered)   Pre-treatment Symptoms/Complaints:  \"thank you\"  Pain: Initial:   Pain Intensity 1: 0  Post Session:  0/10 Assessment/Reassessment only, no treatment provided today    Braces/Orthotics/Lines/Etc:   · IV  · O2 Device: Nasal cannula  Treatment/Session Assessment:    · Response to Treatment:  Pt performs mobility with CGA-supervision. Brief min assist d/t trunk sway, anxiety r/t shortness of breath  · Interdisciplinary Collaboration:   o Physical Therapist  o Registered Nurse  · After treatment position/precautions:   o Up in chair  o Bed/Chair-wheels locked  o Bed in low position  o Call light within reach   · Compliance with Program/Exercises: compliant all of the time. · Recommendations/Intent for next treatment session: \"Next visit will focus on advancements to more challenging activities and reduction in assistance provided\".   Total Treatment Duration:  PT Patient Time In/Time Out  Time In: 1105  Time Out: [address] [facility], [facility]

## 2018-05-17 NOTE — PROGRESS NOTES
Patient is currently an Oncology Bundled patient, not Pneumonia/ COPD. Primary diagnosis of Sepsis. COPD and Pneumonia education given along with spacer and flutter. Unable to afford Breo- may not be on her formulary. Will see if [facility] has samples, and provide before discharge.      Jonatan Marquez, RN- [facility], BSN  Care Transition/ Bundled Payment Navigator  [phone]

## 2018-05-17 NOTE — PROGRESS NOTES
Problem: Self Care Deficits Care Plan (Adult)  Goal: *Acute Goals and Plan of Care (Insert Text)  1. Patient will complete full body bathing and dressing with mod I and adaptive equipment as needed. 2. Patient will complete toileting with mod I.   3. Patient will tolerate 23 minutes of OT treatment with as needed rest breaks to increase activity tolerance for ADLs. 4. Patient will complete functional transfers with mod I and adaptive equipment as needed. 5. Patient will complete grooming tasks in supported sitting at sink level after setup. Timeframe: 7 visits       OCCUPATIONAL THERAPY: Initial Assessment 5/17/2018  INPATIENT: Hospital Day: 3  [place] / [place] / Product Type: Medicare /      NAME/AGE/GENDER: Daiana Denny is a 81 y.o. female   PRIMARY DIAGNOSIS:  COPD exacerbation ([facility] [address] Pneumonia Pneumonia        ICD-10: Treatment Diagnosis:    · Generalized Muscle Weakness (M62.81)  · Other lack of cordination (R27.8)  · Difficulty in walking, Not elsewhere classified (R26.2)   Precautions/Allergies:    falls, Iodinated contrast- oral and iv dye; Sulfa (sulfonamide antibiotics); and Zofran [ondansetron hcl (pf)]      ASSESSMENT:     Ms. Yara Becerra is 82 YO R dominant WF admitted with above. RN asked OT to initially hold evaluation this am due to severe SOB. Pt better this afternoon, agreeable and able to participate. Today, presents supine in bed with RN at bedside giving medications to pt. Pt lives with spouse (who is also \"sick\" per pt)and adult son (who works 12 hour shifts) in a 1 level home with 0 steps at entrance. Pt was Mod I with self care and housework, cooking by pacing her tasks and modifying her clothing. Does not use any DME for ambulation. No history of falls, \"but I have had some close calls\". Pt has T/S combo, but sits in tub for baths usually daily per report.   Pt uses 3-4 L NC oxygen at home 24/7 but reports she can't carry the tank herself out in public and does not even carry her purse anymore. Pt's daughter helps with groceries and heavy cleaning, and provides transportation, but works FT. Today, Pt independent with bed mobility and has fair balance walking without device in her room. Would be safer with a RW and is agreeable to use one. \"Those tanks are heavy\". Pt reports dtr is taking FMLA to help at home with pt and her spouse's care. Pt is most worried about being at her granddaughter's graduation from [employer] next Tuesday. \"How am I going to get in there? \"  Pt able to toilet with SBA, occasional CGA for steadying assist. Stood at sink and washed hands. Pt visibly fatigued with this minimal activity. Coughing throughout and pausing to take breathing breaks. B UEs are WFLs for basic self care tasks, multiple bruises, fragile skin. Pt returned to supine and was made comfortable. All needs in reach. Pt is functioning below her baseline and would benefit from skilled OT to maximize her activity tolerance, independence and safety with self care and functional mobility. Will follow in acute care. Pt does not want to go anywhere for rehab, \"I am going home. \"    This section established at most recent assessment   PROBLEM LIST (Impairments causing functional limitations):  1. Decreased Strength  2. Decreased ADL/Functional Activities  3. Decreased Transfer Abilities  4. Decreased Ambulation Ability/Technique  5. Decreased Balance  6. Increased Pain  7. Decreased Activity Tolerance  8. Decreased Pacing Skills  9. Decreased Work Simplification/Energy Conservation Techniques  10. Increased Fatigue  11. Increased Shortness of Breath  12. Decreased Skin Integrity/Hygeine   INTERVENTIONS PLANNED: (Benefits and precautions of occupational therapy have been discussed with the patient.)  1. Activities of daily living training  2. Adaptive equipment training  3. Balance training  4. Clothing management  5. Cognitive training  6.  Community reintergration  7. Donning&doffing training  8. Group therapy  9. Therapeutic activity  10. Therapeutic exercise  11. Safety training     TREATMENT PLAN: Frequency/Duration: Follow patient 3x per week to address above goals. Rehabilitation Potential For Stated Goals: Good     RECOMMENDED REHABILITATION/EQUIPMENT: (at time of discharge pending progress): Due to the probability of continued deficits (see above) this patient will likely need continued skilled occupational therapy after discharge. Equipment:   Burnis Yohana, reacher for LB dressing              OCCUPATIONAL PROFILE AND HISTORY:   History of Present Injury/Illness (Reason for Referral):  Ms. Mara Huang is a 82 yo female with PMH of non small cell lung cancer followed by oncology Dr. 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 Srinivas Britt with chronic hypoxic respiratory failure on 4 L NC evaluated with dyspnea and fever. She admits to troubles breathing several days, cough with minimal sputum, and chest wall pain, she is anxious and presently feels like she is unable to breathe well. CXR shows RLL mass (personally reviewed) , CT chest/ ABD/pelvis 3,2018 showed enlarging right lung mass with liver met. .3, and has received vancomycin and zosyn in the ED.- meets sepsis criteria due to fever and leukopenia.      Additional history is obtained from friend Pat at bedside      10 systems reviewed and negative except as noted in HPI. - has blurred vision, weight loss, constipation, joint pain, headaches, paresthesia, skin changes, mood changes,      Past Medical History/Comorbidities:   Ms. Mara Huang  has a past medical history of Acquired hypothyroidism (12/30/2016); Cancer ([address] Cancer of lung ([address] Chronic pain; COPD; Hypercholesterolemia; Other ill-defined conditions(799.89);  Sepsis ([address] (5/16/2018); Symptomatic cholelithiasis (12/30/2016); and Unspecified sleep apnea. She also has no past medical history of Aneurysm ([facility] [address] Arrhythmia; Arthritis; Asthma; Autoimmune disease ([facility] [address] CAD (coronary artery disease); Chronic kidney disease; Coagulation disorder ([facility] [address] DEMENTIA; Diabetes ([facility] [address] Endocarditis; GERD (gastroesophageal reflux disease); Heart failure ([facility] [address] Hypertension; Liver disease; Morbid obesity ([facility] [address] Neurological disorder; Psychiatric disorder; PUD (peptic ulcer disease); Rheumatic fever; Seizures ([facility] [address] Sleep disorder; Stroke [facility]); or Thromboembolus ([facility] [address] Ms. Miriam Guzmán  has a past surgical history that includes hx other surgical and hx vascular access. Social History/Living Environment:   Home Environment: Private residence  # Steps to Enter: 0  One/Two Story Residence: One story  Living Alone: No  Support Systems: Spouse/Significant Other/Partner, Child(fernando) (adult son (works 12 hr shifts) lives with pt and )  Patient Expects to be Discharged to[de-identified] Private residence  Current DME Used/Available at Home: Oxygen, portable, Shower chair  Tub or Shower Type: Tub/Shower combination (takes tub baths)  Prior Level of Function/Work/Activity:  Pt lives with spouse (who is also \"sick\" per pt)and adult son (who works 12 hour shifts) in a 1 level home with 0 steps at entrance. Pt was Mod I with self care and housework, cooking by pacing her tasks and modifying her clothing. Does not use any DME for ambulation. No history of falls, \"but I have had some close calls\". Pt has T/S combo, but sits in tub for baths usually daily per report. Pt uses 3-4 L NC oxygen at home 24/7 but reports she can't carry the tank herself out in public and does not even carry her purse anymore. Pt's daughter helps with groceries and heavy cleaning, and provides transportation, but works FT.    Dominant Side:         RIGHT   Number of Personal Factors/Comorbidities that affect the Plan of Care: Extensive review of physical, cognitive, and psychosocial performance (3+):  HIGH COMPLEXITY   ASSESSMENT OF OCCUPATIONAL PERFORMANCE[de-identified]   Activities of Daily Living:           Basic ADLs (From Assessment) Complex ADLs (From Assessment)   Feeding: Independent  Oral Facial Hygiene/Grooming: Setup, Additional time  Bathing: Setup, Additional time  Upper Body Dressing: Setup, Additional time  Lower Body Dressing: Minimum assistance, Additional time  Toileting: Contact guard assistance, Additional time Instrumental ADL  Meal Preparation: Minimum assistance, Additional time (dtr helps)  Homemaking: Minimum assistance, Additional time (dtr helps)  Medication Management: Supervision  Financial Management: Supervision (all bills autodrafted)   Grooming/Bathing/Dressing Activities of Daily Living     Cognitive Retraining  Safety/Judgement: Awareness of environment; Fall prevention                 Functional Transfers  Toilet Transfer : Contact guard assistance  Shower Transfer: Contact guard assistance     Bed/Mat Mobility  Rolling: Independent  Supine to Sit: Independent  Sit to Supine: Independent  Sit to Stand: Supervision  Bed to Chair: Contact guard assistance  Scooting: Independent       Most Recent Physical Functioning:   Gross Assessment:  AROM: Within functional limits  Strength: Generally decreased, functional  Coordination: Generally decreased, functional               Posture:  Posture (WDL): Exceptions to WDL  Posture Assessment:  Forward head, Rounded shoulders  Balance:  Sitting: Intact  Standing: Impaired  Standing - Static: Fair  Standing - Dynamic : Fair Bed Mobility:  Rolling: Independent  Supine to Sit: Independent  Sit to Supine: Independent  Scooting: Independent  Wheelchair Mobility:     Transfers:  Sit to Stand: Supervision  Stand to Sit: Modified independent  Bed to Chair: Contact guard assistance            Patient Vitals for the past 6 hrs:   BP SpO2 O2 Flow Rate (L/min) Pulse   05/17/18 1056 92/50 97 % - 80   05/17/18 1128 - 90 % 4 l/min -   18 1140 - 93 % 4 l/min -       Mental Status  Neurologic State: Alert  Orientation Level: Oriented X4  Cognition: Appropriate for age attention/concentration  Perception: Appears intact  Perseveration: No perseveration noted  Safety/Judgement: Awareness of environment, Fall prevention                          Physical Skills Involved:  1. Range of Motion  2. Balance  3. Strength  4. Activity Tolerance  5. Skin Integrity Cognitive Skills Affected (resulting in the inability to perform in a timely and safe manner):  1. Perception  2. Sustained Attention  3. Divided Attention  4. Comprehension  5. Expression Psychosocial Skills Affected:  1. Habits/Routines  2. Environmental Adaptation  3. Social Interaction  4. Emotional Regulation  5. Self-Awareness  6. Awareness of Others  7. Social Roles   Number of elements that affect the Plan of Care: 5+:  HIGH COMPLEXITY   CLINICAL DECISION MAKIN[address] [place] [id number] AM-PAC 6 Clicks   Daily Activity Inpatient Short Form  How much help from another person does the patient currently need. .. Total A Lot A Little None   1. Putting on and taking off regular lower body clothing? [] 1   [] 2   [x] 3   [] 4   2. Bathing (including washing, rinsing, drying)? [] 1   [] 2   [x] 3   [] 4   3. Toileting, which includes using toilet, bedpan or urinal?   [] 1   [] 2   [x] 3   [] 4   4. Putting on and taking off regular upper body clothing? [] 1   [] 2   [x] 3   [] 4   5. Taking care of personal grooming such as brushing teeth? [] 1   [] 2   [] 3   [x] 4   6. Eating meals? [] 1   [] 2   [] 3   [x] 4   © , Trustees of [address] [place] [id number], under license to [employer]. All rights reserved      Score:  Initial: 20, completed 2018 Most Recent: X (Date: -- )    Interpretation of Tool:  Represents activities that are increasingly more difficult (i.e. Bed mobility, Transfers, Gait).    Score 24 23 22-20 19-15 14-10 9-7 6     Modifier CH CI CJ CK CL CM CN      ? Self Care:     - CURRENT STATUS: CJ - 20%-39% impaired, limited or restricted    - GOAL STATUS: CI - 1%-19% impaired, limited or restricted    - D/C STATUS:  ---------------To be determined---------------  [place] / [place] / Product Type: Medicare /      Medical Necessity:     · Patient demonstrates good rehab potential due to higher previous functional level. Reason for Services/Other Comments:  · Patient continues to require skilled intervention due to s/p above and decreased activity tolerance for ADLs and mobility. Use of outcome tool(s) and clinical judgement create a POC that gives a: LOW COMPLEXITY         TREATMENT:   (In addition to Assessment/Re-Assessment sessions the following treatments were rendered)     Pre-treatment Symptoms/Complaints:  \"I am going to be at my granddaughter's graduation next Tuesday at [facility] [employer]. \"  Pain: Initial:   Pain Intensity 1: 0 (no complaint of pain during OT eval)  Post Session:  Unchanged, does increase with excessive coughing spells, but pt unable to rate pain     Assessment/Reassessment only, no treatment provided today    Braces/Orthotics/Lines/Etc:   · IV  · O2 Device: Nasal cannula  Treatment/Session Assessment:    · Response to Treatment:  Needs frequent rest breaks for all tasks including talking, min A to CGA moving in room, would benefit from RW  · Interdisciplinary Collaboration:   o Physical Therapist  o Occupational Therapist  o Registered Nurse  o   · After treatment position/precautions:   o Supine in bed  o Bed/Chair-wheels locked  o Bed in low position  o Call light within reach  o RN notified  o Side rails x 2   · Compliance with Program/Exercises: compliant most of the time. · Recommendations/Intent for next treatment session: \"Next visit will focus on advancements to more challenging activities and reduction in assistance provided\".   Total Treatment Duration: 23 mins  OT Patient Time In/Time Out  Time In: 1425  Time Out: 1448     Jennifer Pereira, OT   Jennifer Pereira MS, OTR/L

## 2018-05-17 NOTE — PROGRESS NOTES
Patient is agreeable to [facility] services. Requested referral to [facility]. Referral sent to [facility]. Chip, liaison notified. CM will continue to follow.

## 2018-05-17 NOTE — PROGRESS NOTES
Met with patient at bedside. Patient able to answer questions properly. Patient lives in own home with  and son. Patient states she also has a daughter who will stay with her when needed so she has someone with her 24/7. Home has no steps to enter. Prior to admission, patient was requiring some assistance with adls, currently does not drive. Family transports as needed. Currently has home oxygen through [employer] and a [employer]sc. Patient states she cannot afford her Alicia Flick because it's 400 dollars a month. Patient did state her pulmonologist did give her a free sample. Instructed patient to contact her pulmonary MD to see if they have any resources for medication. CM will also look to see if coupon is available. No other needs at this time. CM will continue to follow for discharge needs. Care Management Interventions  PCP Verified by CM:  Yes  Transition of Care Consult (CM Consult): Discharge Planning  Discharge Durable Medical Equipment: No  Physical Therapy Consult: Yes  Occupational Therapy Consult: Yes  Current Support Network: Own Home, Lives with Spouse (lives with son and )  Plan discussed with Pt/Family/Caregiver: Yes  Freedom of Choice Offered: Yes  Discharge Location  Discharge Placement: Unable to determine at this time

## 2018-05-17 NOTE — PROGRESS NOTES
Called to room by RT upon routine rounding. SaO2 79%. Pt was having a panic attack, gasping for air, was not breathing, screaming for help. RT placed on non-rebreather, and SaO2 improved to %. Meds adm, see MAR. Pt now resting in bed, back on nasal cannula and SaO2 stable, 93-95%. Freddie Poe RN attempting to place new IV at this time. Current IV appears to be leaking. Dr. Lucero Izaguirre made aware.

## 2018-05-17 NOTE — PROGRESS NOTES
[facility]  Face to Face Encounter    Patients Name: Jesse Wheeler    YOB: 1945    Ordering Physician: Dr. Lary Gunter    Primary Diagnosis: COPD exacerbation [facility])    Date of Face to Face:   5/17/2018                                  Face to Face Encounter findings are related to primary reason for home care:   yes. 1. I certify that the patient needs intermittent care as follows: skilled nursing care:  skilled observation/assessment, patient education and rehabilitative nursing  physical therapy: strengthening, stretching/ROM, transfer training, gait/stair training, balance training and pt/caregiver education  occupational therapy:  ADL safety (ie. cooking, bathing, dressing), ROM and pt/caregiver education    2. I certify that this patient is homebound, that is: 1) patient requires the use of a  device, special transportation, or assistance of another to leave the home; or 2) patient's condition makes leaving the home medically contraindicated; and 3) patient has a normal inability to leave the home and leaving the home requires considerable and taxing effort. Patient may leave the home for infrequent and short duration for medical reasons, and occasional absences for non-medical reasons. Homebound status is due to the following functional limitations:     3. I certify that this patient is under my care and that I, or a nurse practitioner or  [id number], or clinical nurse specialist, or certified nurse midwife, working with me, had a Face-to-Face Encounter that meets the physician Face-to-Face Encounter requirements.   The following are the clinical findings from the [address] [place], [place] [address]e Street encounter that support the need for skilled services and is a summary of the encounter: see hospital chart    See hospital chart      Monique West RN  5/17/2018      THE FOLLOWING TO BE COMPLETED BY THE COMMUNITY PHYSICIAN:    I concur with the findings described above from the [facility] encounter that this patient is homebound and in need of a skilled service.     Certifying Physician: _____________________________________      Printed Certifying Physician Name: _____________________________________    Date: _________________

## 2018-05-17 NOTE — PROGRESS NOTES
Arlen Andrea  Admission Date: 5/15/2018             Daily Progress Note: 5/17/2018    The patient's chart is reviewed and the patient is discussed with the staff. Admitted with pneumonia and COPD exacerbation. Chronic hypoxia with home oxygen - 4 liters. Has metastatic lung cancer - no current rx. Subjective:    Feels some better today. Still weak and not eating well. Used Breo from home today and think that helps.       Current Facility-Administered Medications   Medication Dose Route Frequency    aspirin delayed-release tablet 81 mg  81 mg Oral DAILY    citalopram (CELEXA) tablet 40 mg  40 mg Oral DAILY    guaiFENesin ER (MUCINEX) tablet 1,200 mg  1,200 mg Oral BID    levothyroxine (SYNTHROID) tablet 88 mcg  88 mcg Oral ACB    LORazepam (ATIVAN) tablet 0.5 mg  0.5 mg Oral Q6H PRN    mirtazapine (REMERON) tablet 15 mg  15 mg Oral QHS    pantoprazole (PROTONIX) tablet 40 mg  40 mg Oral ACB    rosuvastatin (CRESTOR) tablet 10 mg  10 mg Oral QHS    zolpidem (AMBIEN) tablet 5 mg  5 mg Oral QHS PRN    sodium chloride (NS) flush 5-10 mL  5-10 mL IntraVENous Q8H    sodium chloride (NS) flush 5-10 mL  5-10 mL IntraVENous PRN    acetaminophen (TYLENOL) tablet 650 mg  650 mg Oral Q6H PRN    oxyCODONE-acetaminophen (PERCOCET 7.5) 7.5-325 mg per tablet 1 Tab  1 Tab Oral Q4H PRN    naloxone (NARCAN) injection 0.4 mg  0.4 mg IntraVENous PRN    ondansetron (ZOFRAN) injection 4 mg  4 mg IntraVENous Q4H PRN    enoxaparin (LOVENOX) injection 40 mg  40 mg SubCUTAneous Q24H    budesonide (PULMICORT) 500 mcg/2 ml nebulizer suspension  500 mcg Nebulization BID RT    hydrALAZINE (APRESOLINE) 20 mg/mL injection 10 mg  10 mg IntraVENous Q6H PRN    tuberculin injection 5 Units  5 Units IntraDERMal ONCE    piperacillin-tazobactam (ZOSYN) 4.5 g in 0.9% sodium chloride (MBP/ADV) 100 mL  4.5 g IntraVENous Q8H    phenol throat spray (CHLORASEPTIC) 1 Spray  1 Spray Oral PRN    albuterol (PROVENTIL VENTOLIN) nebulizer solution 2.5 mg  2.5 mg Nebulization Q4H RT    albuterol (PROVENTIL VENTOLIN) nebulizer solution 2.5 mg  2.5 mg Nebulization Q4H PRN    methylPREDNISolone (PF) (SOLU-MEDROL) injection 60 mg  60 mg IntraVENous Q8H    morphine 10 mg/mL injection 2 mg  2 mg IntraVENous Q4H PRN         Objective:     Vitals:    05/17/18 0822 05/17/18 1056 05/17/18 1128 05/17/18 1140   BP:  92/50     Pulse:  80     Resp:  20     Temp:  98.5 °F (36.9 °C)     SpO2: 92% 97% 90% 93%   Weight:       Height:         Intake and Output:           Physical Exam:   Constitutional:  the patient is well developed and in no acute distress  HEENT:  Sclera clear, pupils equal, oral mucosa moist  Lungs: wheezing bilaterally. Decreased right base. Wearing 4 liter cannula. Cardiovascular:  RRR without M,G,R  Abd/GI: soft and non-tender; with positive bowel sounds. Ext: warm without cyanosis. There is no lower leg edema. Musculoskeletal: moves all four extremities with equal strength  Skin:  no jaundice or rashes, no wounds   Neuro: no gross neuro deficits   Musculoskeletal: can ambulate. No deformity  Psychiatric: Calm.      ROS: chronic dyspnea, trouble with anxiety  Lines: port, peripheral IV    CHEST XRAY:       LAB  Recent Labs      05/17/18   0527  05/16/18   0000   WBC  4.2*  3.9*   HGB  9.4*  11.5*   HCT  30.0*  35.9   PLT  122*  144*     Recent Labs      05/17/18   0527  05/16/18   0000   NA  141  139   K  4.2  3.9   CL  103  101   CO2  32  36*   GLU  137*  113*   BUN  14  8   CREA  0.74  0.84   ALB  2.5*  3.0*   SGOT  14*  15     Recent Labs      05/16/18   0118   PH  7.46*   PCO2  45   PO2  106*   HCO3  31*         Assessment:  (Medical Decision Making)     Hospital Problems  Date Reviewed: 5/16/2018          Codes Class Noted POA    COPD exacerbation ([facility] ICD-10-CM: J44.1  ICD-9-CM: 491.21  5/16/2018 Yes    Still wheezing today    * (Principal)Pneumonia ICD-10-CM: J18.9  ICD-9-CM: 145  5/16/2018 Yes Blood cultures neg. No sputum for cx    Chronic respiratory failure with hypoxia (HCC) (Chronic) ICD-10-CM: J96.11  ICD-9-CM: 518.83, 799.02  5/16/2018 Yes    Wears 4 liters at home    Anxiety (Chronic) ICD-10-CM: F41.9  ICD-9-CM: 300.00  5/16/2018 Yes    Ativan prn    Pancytopenia (HCC) (Chronic) ICD-10-CM: Y89.909  ICD-9-CM: 284.19  5/16/2018 Yes    Not on chemo anymore. Counts some better today    Sepsis [facility]) ICD-10-CM: A41.9  ICD-9-CM: 038.9, 995.91  5/16/2018 Yes    Associated with pneumonia. Fever down today. Hypotensive today but this has not been a problem     Chronic pain (Chronic) ICD-10-CM: C68.69  ICD-9-CM: 338.29  5/16/2018 Yes    Narcotics prn    Current chronic use of systemic steroids (Chronic) ICD-10-CM: Z79.52  ICD-9-CM: V58.65  5/16/2018 Yes    5 mg per day    Non-small cell lung cancer (HCC) (Chronic) ICD-10-CM: C34.90  ICD-9-CM: 162.9  5/15/2018 Yes    No current Rx - hx radiation, chemo, cyberknife    Acquired hypothyroidism (Chronic) ICD-10-CM: E03.9  ICD-9-CM: 244.9  5/15/2018 Yes    On Rx          Plan:  (Medical Decision Making)   1. Recheck blood pressure - lower than usual. No antihypertensives. May need IV fluids if hypotension persists. 2. Day 3 zosyn. Blood cultures neg. Fever down  3. IV steroids - still wheezing - continue for today and can hopefully wean tomorrow  4. On 4 liters oxygen which is her home flow  5. rediscuss category status - full code at present  6. She has a granddaughter graduating next Tuesday and she is hoping to be strong enough to attend     Rande Aase, NP    More than 50% of time documented was spent face-to-face contact with the patient and in the care of the patient on the floor/unit where the patient is located. Very slow improvement.   Still feels weak and is wheezing heavily but dyspnea slightly improved    Lungs: wheezing bilaterally  Heart:  RRR with no Murmur/Rubs/Gallops    --Discussed code status with patient - she is DNR  -- Continue antibiotics and high dose steroids  -- Continue bronchodilators    I have spoken with and examined the patient. I agree with the above assessment and plan as documented.     Renetta Leal MD

## 2018-05-18 NOTE — PROGRESS NOTES
RE dc planning and IV antibiotics. Patient currently on antibx every 12 hours. Is it possible to change to an antibiotic once per day so she can receive at the Infusion center? One cannot go to the infusion center Q12. If she were to do infusion in the home the out of pocket cost is 440.72 per week. Per [facility]. [facility] would also have to specialty order the drug. Need to clarify the plan. Marlys Marie

## 2018-05-18 NOTE — PROGRESS NOTES
Progress Note    Patient: Zuleika Saini MRN: [id number]  SSN: [id number]    YOB: 1945  Age: 81 y.o. Sex: female      Admit Date: 5/15/2018    LOS: 2 days     Subjective:     From prior documentation: \"Patient 72F with pmhx of NSCLC follows with oncology. S/p rtx and chemo. Chemo stopped d/t disease progression, liver mets. Hx of non hodgkins lymphoma in 2008. COPD Gold stage IV follows with [place] [facility]. Presents with cough, increasing dyspnea, fevers and chest pain. ED workup notable for CXR with RLL mass. CT CAP 3/2018 showed enlarging right lung mass with liver met. Temp of 103 on arrival. Admitted for sepsis r/t pneumonia and COPD exacerbation. Started on IV steroids, IV atbx. Pulm consulted.     5/17 - had \"panic attack\" as reported by the RN this morning. Improved with iv morphine\"    5/18 - pt denies new complaints today. No cp, dyspnea, abd pain. Review of Systems:  Pertinent per HPI.     Medications:  Current Facility-Administered Medications   Medication Dose Route Frequency    aspirin delayed-release tablet 81 mg  81 mg Oral DAILY    citalopram (CELEXA) tablet 40 mg  40 mg Oral DAILY    guaiFENesin ER (MUCINEX) tablet 1,200 mg  1,200 mg Oral BID    levothyroxine (SYNTHROID) tablet 88 mcg  88 mcg Oral ACB    LORazepam (ATIVAN) tablet 0.5 mg  0.5 mg Oral Q6H PRN    mirtazapine (REMERON) tablet 15 mg  15 mg Oral QHS    pantoprazole (PROTONIX) tablet 40 mg  40 mg Oral ACB    rosuvastatin (CRESTOR) tablet 10 mg  10 mg Oral QHS    zolpidem (AMBIEN) tablet 5 mg  5 mg Oral QHS PRN    sodium chloride (NS) flush 5-10 mL  5-10 mL IntraVENous Q8H    sodium chloride (NS) flush 5-10 mL  5-10 mL IntraVENous PRN    acetaminophen (TYLENOL) tablet 650 mg  650 mg Oral Q6H PRN    oxyCODONE-acetaminophen (PERCOCET 7.5) 7.5-325 mg per tablet 1 Tab  1 Tab Oral Q4H PRN    naloxone (NARCAN) injection 0.4 mg  0.4 mg IntraVENous PRN    ondansetron (ZOFRAN) injection 4 mg  4 mg IntraVENous Q4H PRN    enoxaparin (LOVENOX) injection 40 mg  40 mg SubCUTAneous Q24H    budesonide (PULMICORT) 500 mcg/2 ml nebulizer suspension  500 mcg Nebulization BID RT    hydrALAZINE (APRESOLINE) 20 mg/mL injection 10 mg  10 mg IntraVENous Q6H PRN    piperacillin-tazobactam (ZOSYN) 4.5 g in 0.9% sodium chloride (MBP/ADV) 100 mL  4.5 g IntraVENous Q8H    phenol throat spray (CHLORASEPTIC) 1 Spray  1 Spray Oral PRN    albuterol (PROVENTIL VENTOLIN) nebulizer solution 2.5 mg  2.5 mg Nebulization Q4H RT    albuterol (PROVENTIL VENTOLIN) nebulizer solution 2.5 mg  2.5 mg Nebulization Q4H PRN    methylPREDNISolone (PF) (SOLU-MEDROL) injection 60 mg  60 mg IntraVENous Q8H    morphine 10 mg/mL injection 2 mg  2 mg IntraVENous Q4H PRN       Objective:     Vitals:    05/18/18 0707 05/18/18 0745 05/18/18 1101 05/18/18 1127   BP: 113/60  135/72    Pulse: 68  73    Resp: 20  20    Temp: 98 °F (36.7 °C)  98.2 °F (36.8 °C)    SpO2: 97% 93% 96% 97%   Weight:       Height:            Physical Exam:   General: awake, alert, no apparent distress  Lungs: Clear to auscultation bilaterally. Heart: Regular rate and rhythm. No appreciable murmur. Abdomen: Soft, nontender, nondistended. Bowel sounds normal.  Extremities:  No LE edema. Skin: Warm/dry. Psych: Normal mood and affect.     Lab/Data Review:  Recent Results (from the past 24 hour(s))   PLEASE READ & DOCUMENT PPD TEST IN 24 HRS    Collection Time: 05/17/18  3:56 PM   Result Value Ref Range    PPD  Negative    mm Induration  0 mm   CBC W/O DIFF    Collection Time: 05/18/18  5:54 AM   Result Value Ref Range    WBC 4.6 4.3 - 11.1 K/uL    RBC 2.66 (L) 4.05 - 5.25 M/uL    HGB 9.1 (L) 11.7 - 15.4 g/dL    HCT 29.6 (L) 35.8 - 46.3 %    .3 (H) 79.6 - 97.8 FL    MCH 34.2 (H) 26.1 - 32.9 PG    MCHC 30.7 (L) 31.4 - 35.0 g/dL    RDW 16.3 (H) 11.9 - 14.6 %    PLATELET 148 (L) 644 - 450 K/uL    MPV 8.7 (L) 10.8 - 91.8 FL   METABOLIC PANEL, BASIC    Collection Time: 05/18/18  5:54 AM   Result Value Ref Range    Sodium 145 136 - 145 mmol/L    Potassium 4.0 3.5 - 5.1 mmol/L    Chloride 106 98 - 107 mmol/L    CO2 35 (H) 21 - 32 mmol/L    Anion gap 4 (L) 7 - 16 mmol/L    Glucose 134 (H) 65 - 100 mg/dL    BUN 19 8 - 23 MG/DL    Creatinine 0.67 0.6 - 1.0 MG/DL    GFR est AA >60 >60 ml/min/1.73m2    GFR est non-AA >60 >60 ml/min/1.73m2    Calcium 8.3 8.3 - 10.4 MG/DL     I have reviewed new clinical data. Assessment:     Principal Problem:    Pneumonia (5/16/2018)    Active Problems:    Non-small cell lung cancer ([facility] [address] (5/15/2018)      Acquired hypothyroidism (5/15/2018)      COPD exacerbation ([facility] [address] (5/16/2018)      Chronic respiratory failure with hypoxia ([facility] [address] (5/16/2018)      Anxiety (5/16/2018)      Pancytopenia ([address] (5/16/2018)      Sepsis ([facility] [address] (5/16/2018)      Chronic pain (5/16/2018)      Current chronic use of systemic steroids (5/16/2018)      Plan:     - Sepsis - secondary to pneumonia in setting of lung ca. Clinically improved. Blood cultures negative x 2 days.  - COPD exacerbation - IV steroids, BD's. Symptom mgmt with prn morphine/ativan. pulm following  - anxiety - prn ativan. Cont home meds  - Chronic pain - prn opioids  - Pancytopenia - monitor daily. Transfuse prn, stable today.   - DNR status      DVT Prophylaxis: lovenox sq    Signed By: Dianelys Fontanez, DO     May 18, 2018

## 2018-05-18 NOTE — PROGRESS NOTES
Provided patient with website and information on savings for [facility] medication. Patient very appreciative. 73 year-old F with PMH of asthma, recent R arm trauma, and L lung lobectomy who presented to the hospital with a one day history of shortness of breath and cough. Patient took Ventolin at home with minimal relief. She endorses that her cough is productive of clear sputum and she denies recent travel and sick contacts. She denies subjective fevers, nausea/vomiting, chest pain, and urinary symptoms.

## 2018-05-18 NOTE — PROGRESS NOTES
PT note: Patient refusing therapy this afternoon on two attempts, stating she is \"too tired and can't do it right now\". Will check back another day to continue with therapy services.      MAXIMINO MoralesT

## 2018-05-18 NOTE — PROGRESS NOTES
Magdy Elizondo  Admission Date: 5/15/2018             Daily Progress Note: 5/18/2018    The patient's chart is reviewed and the patient is discussed with the staff. Admitted with pneumonia and COPD exacerbation. Chronic hypoxia with home oxygen - 4 liters. Has metastatic lung cancer - no current rx. Subjective:     Patient states she is feeling some better. Not yet back to baseline. She is hopeful to be discharged in the next couple of days. On home amount of O2 at 4L. Still SOB with any exertion. Ongoing wheeze.      Current Facility-Administered Medications   Medication Dose Route Frequency    aspirin delayed-release tablet 81 mg  81 mg Oral DAILY    citalopram (CELEXA) tablet 40 mg  40 mg Oral DAILY    guaiFENesin ER (MUCINEX) tablet 1,200 mg  1,200 mg Oral BID    levothyroxine (SYNTHROID) tablet 88 mcg  88 mcg Oral ACB    LORazepam (ATIVAN) tablet 0.5 mg  0.5 mg Oral Q6H PRN    mirtazapine (REMERON) tablet 15 mg  15 mg Oral QHS    pantoprazole (PROTONIX) tablet 40 mg  40 mg Oral ACB    rosuvastatin (CRESTOR) tablet 10 mg  10 mg Oral QHS    zolpidem (AMBIEN) tablet 5 mg  5 mg Oral QHS PRN    sodium chloride (NS) flush 5-10 mL  5-10 mL IntraVENous Q8H    sodium chloride (NS) flush 5-10 mL  5-10 mL IntraVENous PRN    acetaminophen (TYLENOL) tablet 650 mg  650 mg Oral Q6H PRN    oxyCODONE-acetaminophen (PERCOCET 7.5) 7.5-325 mg per tablet 1 Tab  1 Tab Oral Q4H PRN    naloxone (NARCAN) injection 0.4 mg  0.4 mg IntraVENous PRN    ondansetron (ZOFRAN) injection 4 mg  4 mg IntraVENous Q4H PRN    enoxaparin (LOVENOX) injection 40 mg  40 mg SubCUTAneous Q24H    budesonide (PULMICORT) 500 mcg/2 ml nebulizer suspension  500 mcg Nebulization BID RT    hydrALAZINE (APRESOLINE) 20 mg/mL injection 10 mg  10 mg IntraVENous Q6H PRN    piperacillin-tazobactam (ZOSYN) 4.5 g in 0.9% sodium chloride (MBP/ADV) 100 mL  4.5 g IntraVENous Q8H    phenol throat spray (CHLORASEPTIC) 1 Spray  1 Spray Oral PRN    albuterol (PROVENTIL VENTOLIN) nebulizer solution 2.5 mg  2.5 mg Nebulization Q4H RT    albuterol (PROVENTIL VENTOLIN) nebulizer solution 2.5 mg  2.5 mg Nebulization Q4H PRN    methylPREDNISolone (PF) (SOLU-MEDROL) injection 60 mg  60 mg IntraVENous Q8H    morphine 10 mg/mL injection 2 mg  2 mg IntraVENous Q4H PRN       Review of Systems  Constitutional: negative for fever, chills, sweats  Cardiovascular: negative for chest pain, palpitations, syncope, edema  Gastrointestinal: negative for dysphagia, reflux, vomiting, diarrhea, abdominal pain, or melena  Neurologic: negative for focal weakness, numbness, headache    Objective:     Vitals:    05/18/18 0707 05/18/18 0745 05/18/18 1101 05/18/18 1127   BP: 113/60  135/72    Pulse: 68  73    Resp: 20  20    Temp: 98 °F (36.7 °C)  98.2 °F (36.8 °C)    SpO2: 97% 93% 96% 97%   Weight:       Height:         Intake and Output:   05/16 1901 - 05/18 0700  In: 360 [P.O.:360]  Out: -        Physical Exam:   Constitution:  the patient is well developed and in no acute distress  EENMT:  Sclera clear, pupils equal, oral mucosa moist  Respiratory: R>L exp wheeze  Cardiovascular:  RRR without M,G,R  Gastrointestinal: soft and non-tender; with positive bowel sounds. Musculoskeletal: warm without cyanosis. There is no lower leg edema. Skin:  no jaundice or rashes, no wounds   Neurologic: no gross neuro deficits     Psychiatric:  alert and oriented x ppt    CHEST XRAY:   5/15/18  1. No evidence of acute pulmonary disease.   2. Right lower lobe mass, similar to prior exam.      LAB  No lab exists for component: [place]   Recent Labs      05/18/18   0554  05/17/18   0527  05/16/18   0000   WBC  4.6  4.2*  3.9*   HGB  9.1*  9.4*  11.5*   HCT  29.6*  30.0*  35.9   PLT  117*  122*  144*     Recent Labs      05/18/18   0554  05/17/18   0527  05/16/18   0000   NA  145  141  139   K  4.0  4.2  3.9   CL  106  103  101   CO2  35*  32  36*   GLU  134*  137*  113*   BUN 19  14  8   CREA  0.67  0.74  0.84   CA  8.3  8.6  8.6   ALB   --   2.5*  3.0*   SGOT   --   14*  15     Recent Labs      05/16/18   0118   PH  7.46*   PCO2  45   PO2  106*   HCO3  31*       MICRO  Blood - NGTD    Assessment:  (Medical Decision Making)     Hospital Problems  Date Reviewed: 5/16/2018          Codes Class Noted POA    COPD exacerbation ([place] [address] ICD-10-CM: J44.1  ICD-9-CM: 491.21  5/16/2018 Yes        * (Principal)Pneumonia ICD-10-CM: J18.9  ICD-9-CM: 306  5/16/2018 Yes        Chronic respiratory failure with hypoxia (HCC) (Chronic) ICD-10-CM: J96.11  ICD-9-CM: 518.83, 799.02  5/16/2018 Yes        Anxiety (Chronic) ICD-10-CM: F41.9  ICD-9-CM: 300.00  5/16/2018 Yes        Pancytopenia (HCC) (Chronic) ICD-10-CM: B36.428  ICD-9-CM: 284.19  5/16/2018 Yes        Sepsis ([facility] ICD-10-CM: A41.9  ICD-9-CM: 038.9, 995.91  5/16/2018 Yes        Chronic pain (Chronic) ICD-10-CM: Z10.52  ICD-9-CM: 338.29  5/16/2018 Yes        Current chronic use of systemic steroids (Chronic) ICD-10-CM: Z79.52  ICD-9-CM: V58.65  5/16/2018 Yes        Non-small cell lung cancer ([facility] (Chronic) ICD-10-CM: C34.90  ICD-9-CM: 162.9  5/15/2018 Yes        Acquired hypothyroidism (Chronic) ICD-10-CM: E03.9  ICD-9-CM: 244.9  5/15/2018 Yes            Patient with ongoing but seemingly improving COPD exacerbation. Mention made of PNA but no changes on CXR. Afebrile now and with normal WBC. Plan:  (Medical Decision Making)   -will d/c zosyn and start doxy x 5 days. -wean IV steroids. Likely change to PO soon.   -cont albuterok, pulmicort.   -she is on home amount of O2.   -she hopes to be discharged in next 1-2 days.        Jerica Fermin MD

## 2018-05-18 NOTE — INTERDISCIPLINARY ROUNDS
Interdisciplinary Rounds completed 05/18/18. Nursing, Case Management, Physician and PT present.      Plan of care reviewed and updated.

## 2018-05-18 NOTE — PROGRESS NOTES
Hospitalist Progress Note    2018  Admit Date: 5/15/2018 10:58 PM   NAME: Daiana King   :     MRN:  [id number]   Attending: Tori Habermann, MD  PCP:  Noy Aguilar MD    SUBJECTIVE:   Patient 72F with pmhx of NSCLC follows with oncology. S/p rtx and chemo. Chemo stopped d/t disease progression, liver mets. Hx of non hodgkins lymphoma in . COPD Gold stage IV follows with [facility]. Presents with cough, increasing dyspnea, fevers and chest pain. ED workup notable for CXR with RLL mass. CT CAP 3/2018 showed enlarging right lung mass with liver met. Temp of 103 on arrival. Admitted for sepsis r/t pneumonia and COPD exacerbation. Started on IV steroids, IV atbx. Pulm consulted.  - had \"panic attack\" as reported by the RN this morning.  Improved with iv morphine    Review of Systems negative with exception of pertinent positives noted above  PHYSICAL EXAM     Visit Vitals    /61 (BP 1 Location: Right arm, BP Patient Position: Head of bed elevated (Comment degrees))    Pulse 94    Temp 97.4 °F (36.3 °C)    Resp 29    Ht 5' 4\" (1.626 m)    Wt 63 kg (139 lb)    SpO2 91%    Breastfeeding No    BMI 23.86 kg/m2      Temp (24hrs), Av.1 °F (36.7 °C), Min:97.4 °F (36.3 °C), Max:98.5 °F (36.9 °C)    Oxygen Therapy  O2 Sat (%): 91 % (18)  Pulse via Oximetry: 87 beats per minute (18)  O2 Device: Nasal cannula (18)  O2 Flow Rate (L/min): 4 l/min (18)  FIO2 (%): 36 % (18)  ETCO2 (mmHg): 4 mmHg (18)    Intake/Output Summary (Last 24 hours) at 18  Last data filed at 18 180   Gross per 24 hour   Intake              360 ml   Output                0 ml   Net              360 ml      General: No acute distress    Lungs:  Diminished, exp wheezing  Heart:  Regular rate and rhythm,  No murmur, rub, or gallop  Abdomen: Soft, Non distended, Non tender, Positive bowel sounds  Extremities: No cyanosis, clubbing or edema  Neurologic:  No focal deficits    ASSESSMENT      Active Hospital Problems    Diagnosis Date Noted    COPD exacerbation ([facility] 05/16/2018    Pneumonia 05/16/2018    Chronic respiratory failure with hypoxia ([facility] 05/16/2018    Anxiety 05/16/2018    [facility] ([facility] 05/16/2018    Sepsis ([facility] 05/16/2018    Chronic pain 05/16/2018    Current chronic use of systemic steroids 05/16/2018    Acquired hypothyroidism 05/15/2018    Non-small cell lung cancer ([facility] 05/15/2018     A/p  - Sepsis - secondary to pneumonia in setting of lung ca. Clinically improved. - COPD exacerbation - IV steroids, BD's. Symptom mgmt with prn morphine/ativan. pulm following  - anxiety - prn ativan. Cont home meds  - Chronic pain - prn opiods  - Pancytopenia - monitor daily.  Transfuse prn  - DNR status     DVT Prophylaxis: lovenox sq    Signed By: Risa Strong DO     May 17, 2018

## 2018-05-18 NOTE — PROGRESS NOTES
Pt rounded on hourly during shift. Pt c/o pain during shift and given medication per MAR. No other needs stated. Will monitor until report to day shift RN.

## 2018-05-19 NOTE — PROGRESS NOTES
Pt eager to go home. Pt in bed resting comfortably. No new complaints. Hourly rounds completed this shift.

## 2018-05-19 NOTE — PROGRESS NOTES
Daiana Denny  Admission Date: 5/15/2018             Daily Progress Note: 5/19/2018    The patient's chart is reviewed and the patient is discussed with the staff.     Admitted with pneumonia and COPD exacerbation. Chronic hypoxia with home oxygen - 4 liters. Has metastatic lung cancer - no current rx.      Subjective:     Feeling little better  Cough but not much sputum   On home 02- 3-4L    Current Facility-Administered Medications   Medication Dose Route Frequency    doxycycline (VIBRAMYCIN) 100 mg in 0.9% sodium chloride (MBP/ADV) 100 mL  100 mg IntraVENous Q12H    methylPREDNISolone (PF) (SOLU-MEDROL) injection 40 mg  40 mg IntraVENous Q12H    aspirin delayed-release tablet 81 mg  81 mg Oral DAILY    citalopram (CELEXA) tablet 40 mg  40 mg Oral DAILY    guaiFENesin ER (MUCINEX) tablet 1,200 mg  1,200 mg Oral BID    levothyroxine (SYNTHROID) tablet 88 mcg  88 mcg Oral ACB    LORazepam (ATIVAN) tablet 0.5 mg  0.5 mg Oral Q6H PRN    mirtazapine (REMERON) tablet 15 mg  15 mg Oral QHS    pantoprazole (PROTONIX) tablet 40 mg  40 mg Oral ACB    rosuvastatin (CRESTOR) tablet 10 mg  10 mg Oral QHS    zolpidem (AMBIEN) tablet 5 mg  5 mg Oral QHS PRN    sodium chloride (NS) flush 5-10 mL  5-10 mL IntraVENous Q8H    sodium chloride (NS) flush 5-10 mL  5-10 mL IntraVENous PRN    acetaminophen (TYLENOL) tablet 650 mg  650 mg Oral Q6H PRN    oxyCODONE-acetaminophen (PERCOCET 7.5) 7.5-325 mg per tablet 1 Tab  1 Tab Oral Q4H PRN    naloxone (NARCAN) injection 0.4 mg  0.4 mg IntraVENous PRN    ondansetron (ZOFRAN) injection 4 mg  4 mg IntraVENous Q4H PRN    enoxaparin (LOVENOX) injection 40 mg  40 mg SubCUTAneous Q24H    budesonide (PULMICORT) 500 mcg/2 ml nebulizer suspension  500 mcg Nebulization BID RT    hydrALAZINE (APRESOLINE) 20 mg/mL injection 10 mg  10 mg IntraVENous Q6H PRN    phenol throat spray (CHLORASEPTIC) 1 Spray  1 Spray Oral PRN    albuterol (PROVENTIL VENTOLIN) nebulizer solution 2.5 mg  2.5 mg Nebulization Q4H RT    albuterol (PROVENTIL VENTOLIN) nebulizer solution 2.5 mg  2.5 mg Nebulization Q4H PRN    morphine 10 mg/mL injection 2 mg  2 mg IntraVENous Q4H PRN       Review of Systems    Constitutional: negative for fever, chills, sweats  Cardiovascular: negative for chest pain, palpitations, syncope, edema  Gastrointestinal:  negative for dysphagia, reflux, vomiting, diarrhea, abdominal pain, or melena  Neurologic:  negative for focal weakness, numbness, headache    Objective:     Vitals:    05/19/18 0140 05/19/18 0349 05/19/18 0745 05/19/18 0806   BP: 122/64   116/58   Pulse: 83   (!) 103   Resp: 26   22   Temp:    98 °F (36.7 °C)   SpO2: 95% 96% 100% 92%   Weight:       Height:         Intake and Output:           Physical Exam:   Constitution:  the patient is well developed and in no acute distress  EENMT:  Sclera clear, pupils equal, oral mucosa moist  Respiratory: rhonchi  Cardiovascular:  RRR without M,G,R  Gastrointestinal: soft and non-tender; with positive bowel sounds. Musculoskeletal: warm without cyanosis. There is no lower leg edema.   Skin:  no jaundice or rashes, no wounds   Neurologic: no gross neuro deficits     Psychiatric:  alert and oriented x 3    CXR:          Recent Labs      05/18/18   0554  05/17/18   0527   WBC  4.6  4.2*   HGB  9.1*  9.4*   HCT  29.6*  30.0*   PLT  117*  122*     Recent Labs      05/18/18   0554  05/17/18   0527   NA  145  141   K  4.0  4.2   CL  106  103   CO2  35*  32   GLU  134*  137*   BUN  19  14   CREA  0.67  0.74   CA  8.3  8.6   ALB   --   2.5*   TBILI   --   0.3   ALT   --   10*   SGOT   --   14*         Assessment:  (Medical Decision Making)     Hospital Problems  Date Reviewed: 5/16/2018          Codes Class Noted POA    COPD exacerbation ([facility] ICD-10-CM: J44.1  ICD-9-CM: 491.21  5/16/2018 Yes        * (Principal)Pneumonia ICD-10-CM: J18.9  ICD-9-CM: 223  5/16/2018 Yes        Chronic respiratory failure with hypoxia (HCC) (Chronic) ICD-10-CM: J96.11  ICD-9-CM: 518.83, 799.02  5/16/2018 Yes        Anxiety (Chronic) ICD-10-CM: F41.9  ICD-9-CM: 300.00  5/16/2018 Yes        Pancytopenia (HCC) (Chronic) ICD-10-CM: S20.347  ICD-9-CM: 284.19  5/16/2018 Yes        Sepsis ([facility] ICD-10-CM: A41.9  ICD-9-CM: 038.9, 995.91  5/16/2018 Yes        Chronic pain (Chronic) ICD-10-CM: O39.30  ICD-9-CM: 338.29  5/16/2018 Yes        Current chronic use of systemic steroids (Chronic) ICD-10-CM: Z79.52  ICD-9-CM: V58.65  5/16/2018 Yes        Non-small cell lung cancer ([facility] (Chronic) ICD-10-CM: C34.90  ICD-9-CM: 162.9  5/15/2018 Yes        Acquired hypothyroidism (Chronic) ICD-10-CM: E03.9  ICD-9-CM: 244.9  5/15/2018 Yes              Plan:  (Medical Decision Making)     -- Doxy for 5 days  - wean steroids-change to PO in am  -continue nebs, home 02  -home soon -? Am if stable    More than 50% of the time documented was spent in face-to-face contact with the patient and in the care of the patient on the floor/unit where the patient is located.     Jose Wright MD

## 2018-05-19 NOTE — PROGRESS NOTES
Code RR called due to pt stating she could not breathe. O2 sat assessed found to be at 77% on 3LPM. HR elevated at 123. O2 was increased from 3 to 6LPM and O2 sat remained in 70s despite efforts. Pt sitting up on side of bed, instructed to take deep breathes through nose and coughing. Pt coughing profusely and sentences are choppy. Lungs auscultated and found with adventitious sounds. Crackles and wheezing heard. RR team arrived and assisted pt to lie in bed in upright position. O2 sat increased to 94% once in bed. HR less at 83. Pt no longer coughing. Pt stable at the moment. Will continue to monitor closely.

## 2018-05-19 NOTE — PROGRESS NOTES
Progress Note    Patient: Orly Peñaloza MRN: [id number]  SSN: [id number]    YOB: 1945  Age: 81 y.o. Sex: female      Admit Date: 5/15/2018    LOS: 3 days     Subjective:     From prior documentation: \"Patient 72F with pmhx of NSCLC follows with oncology. S/p rtx and chemo. Chemo stopped d/t disease progression, liver mets. Hx of non hodgkins lymphoma in 2008. COPD Gold stage IV follows with [place] [facility]. Presents with cough, increasing dyspnea, fevers and chest pain. ED workup notable for CXR with RLL mass. CT CAP 3/2018 showed enlarging right lung mass with liver met. Temp of 103 on arrival. Admitted for sepsis r/t pneumonia and COPD exacerbation. Started on IV steroids, IV atbx. Pulm consulted.     5/17 - had \"panic attack\" as reported by the RN this morning. Improved with iv morphine\"    5/18 - pt denies new complaints today. No cp, dyspnea, abd pain. 5/19 - pt complaining of intermittent dyspnea. States morphine helps, but reports her family doesn't like her taking it because it makes her \"weird. \"  She otherwise has no additional complaints today. Review of Systems:  Pertinent per HPI.     Medications:  Current Facility-Administered Medications   Medication Dose Route Frequency    doxycycline (VIBRAMYCIN) 100 mg in 0.9% sodium chloride (MBP/ADV) 100 mL  100 mg IntraVENous Q12H    methylPREDNISolone (PF) (SOLU-MEDROL) injection 40 mg  40 mg IntraVENous Q12H    aspirin delayed-release tablet 81 mg  81 mg Oral DAILY    citalopram (CELEXA) tablet 40 mg  40 mg Oral DAILY    guaiFENesin ER (MUCINEX) tablet 1,200 mg  1,200 mg Oral BID    levothyroxine (SYNTHROID) tablet 88 mcg  88 mcg Oral ACB    LORazepam (ATIVAN) tablet 0.5 mg  0.5 mg Oral Q6H PRN    mirtazapine (REMERON) tablet 15 mg  15 mg Oral QHS    pantoprazole (PROTONIX) tablet 40 mg  40 mg Oral ACB    rosuvastatin (CRESTOR) tablet 10 mg  10 mg Oral QHS    zolpidem (AMBIEN) tablet 5 mg  5 mg Oral QHS PRN    sodium chloride (NS) flush 5-10 mL  5-10 mL IntraVENous Q8H    sodium chloride (NS) flush 5-10 mL  5-10 mL IntraVENous PRN    acetaminophen (TYLENOL) tablet 650 mg  650 mg Oral Q6H PRN    oxyCODONE-acetaminophen (PERCOCET 7.5) 7.5-325 mg per tablet 1 Tab  1 Tab Oral Q4H PRN    naloxone (NARCAN) injection 0.4 mg  0.4 mg IntraVENous PRN    ondansetron (ZOFRAN) injection 4 mg  4 mg IntraVENous Q4H PRN    enoxaparin (LOVENOX) injection 40 mg  40 mg SubCUTAneous Q24H    budesonide (PULMICORT) 500 mcg/2 ml nebulizer suspension  500 mcg Nebulization BID RT    hydrALAZINE (APRESOLINE) 20 mg/mL injection 10 mg  10 mg IntraVENous Q6H PRN    phenol throat spray (CHLORASEPTIC) 1 Spray  1 Spray Oral PRN    albuterol (PROVENTIL VENTOLIN) nebulizer solution 2.5 mg  2.5 mg Nebulization Q4H RT    albuterol (PROVENTIL VENTOLIN) nebulizer solution 2.5 mg  2.5 mg Nebulization Q4H PRN    morphine 10 mg/mL injection 2 mg  2 mg IntraVENous Q4H PRN       Objective:     Vitals:    05/19/18 0140 05/19/18 0349 05/19/18 0745 05/19/18 0806   BP: 122/64   116/58   Pulse: 83   (!) 103   Resp: 26   22   Temp:    98 °F (36.7 °C)   SpO2: 95% 96% 100% 92%   Weight:       Height:            Physical Exam:   General: awake, alert, no apparent distress  Lungs: Clear to auscultation bilaterally. Heart: Regular rate and rhythm. No appreciable murmur. Abdomen: Soft, nontender, nondistended. Bowel sounds normal.  Extremities:  No LE edema. Skin: Warm/dry. Psych: Normal mood and affect.       Assessment:     Principal Problem:    Pneumonia (5/16/2018)    Active Problems:    Non-small cell lung cancer ([facility] (5/15/2018)      Acquired hypothyroidism (5/15/2018)      COPD exacerbation ([facility] (5/16/2018)      Ch[facility] (5/16/2018)      Anxiety (5/16/2018)      Pancytopenia ([facility] (5/16/2018)      Sepsis ([facility] (5/16/2018)      Chronic pain (5/16/2018)      Current chronic use of systemic steroids (5/16/2018)      Plan:     - Sepsis - secondary to pneumonia in setting of lung ca. Clinically improved. Blood cultures negative x 2 days.  - COPD exacerbation - IV steroids, BD's. Symptom mgmt with prn morphine/ativan. pulm following. Transition to PO steroids in AM and wean O2 as tolerated per pulm. - anxiety - prn ativan. Cont home meds  - Chronic pain - prn opioids  - DNR status    Change morphine to 1mg as needed to avoid potential side effect of making patient act \"weird\"     DVT Prophylaxis: lovenox sq  Dispo: possible DC in AM tomorrow if off O2 and medically stable.     Signed By: Loren Thibodeaux DO     May 19, 2018

## 2018-05-19 NOTE — PROGRESS NOTES
[facility] [address] CRITICAL CARE OUTREACH NURSE PROGRESS REPORT      SUBJECTIVE: Called to assess patient secondary to Rapid Response. MEWS Score: 3 (05/19/18 0806)  Vitals:    05/19/18 0745 05/19/18 0806 05/19/18 1124 05/19/18 1138   BP:  116/58  144/78   Pulse:  (!) 103  65   Resp:  22  19   Temp:  98 °F (36.7 °C)  98.5 °F (36.9 °C)   SpO2: 100% 92% 100% 96%   Weight:       Height:            LAB DATA:    Recent Labs      05/18/18   0554  05/17/18   0527   NA  145  141   K  4.0  4.2   CL  106  103   CO2  35*  32   AGAP  4*  6*   GLU  134*  137*   BUN  19  14   CREA  0.67  0.74   GFRAA  >60  >60   GFRNA  >60  >60   CA  8.3  8.6   ALB   --   2.5*   TP   --   5.7*   GLOB   --   3.2   AGRAT   --   0.8*   ALT   --   10*        Recent Labs      05/18/18   0554  05/17/18   0527   WBC  4.6  4.2*   HGB  9.1*  9.4*   HCT  29.6*  30.0*   PLT  117*  122*          OBJECTIVE: On arrival to room, I found patient to be sitting up in bed on NC with RT and primary RN at bedside. Pain Assessment  Pain Intensity 1: 0 (05/19/18 0746)  Pain Location 1: Generalized  Pain Intervention(s) 1: Repositioned, Rest  Patient Stated Pain Goal: 0                                 ASSESSMENT:  Rapid Response called because patient was stating she was short of breath. Patient given a breathing treatment by RT and given Morphine by primary RN . Patient to be switched to Airvo if needed. O2 sat in the 90s and patient states she is feeling better. PLAN:  Will add to outreach rounding schedule.

## 2018-05-19 NOTE — PROGRESS NOTES
Responded to rapid response  Patient was calm  No family present.   noted the serious nature of her illness  Will continue to support as needed    Jocelyn Joseph, staff [address] [facility]  /   [email or web address]

## 2018-05-19 NOTE — PROGRESS NOTES
Hourly rounding during shift. Pt c/o pain at beginning of shift given medication per [facility]. Pt showing altered mental status at 0100 stating she \"wanted to go get her dog outside the door. \" bladder scan showed less than 20 ml urine. Pt reoriented and back to bed. Pt able to sleep the rest of the night. No other needs stated at this time. Will monitor until day shift RN.

## 2018-05-20 NOTE — PROGRESS NOTES
Date of Outreach Update:  Xavier Newell was seen and assessed. MEWS Score: 2 (05/19/18 1215)  Vitals:    05/19/18 1527 05/19/18 1550 05/19/18 1952 05/19/18 2010   BP:  114/64  120/57   Pulse:  81  87   Resp:  18  21   Temp:  97.9 °F (36.6 °C)  97.9 °F (36.6 °C)   SpO2: 98% 99% 100% 100%   Weight:       Height:             Pain Assessment  Pain Intensity 1: 0 (05/19/18 0746)  Pain Location 1: Generalized  Pain Intervention(s) 1: Repositioned, Rest  Patient Stated Pain Goal: 0      Previous Outreach assessment has been reviewed. There have been no significant clinical changes since the completion of the last dated Outreach assessment. Pt sitting on side of bed A&O x4. Denies SOB. SpO2 100% on 4L NC. Wheezing present when lungs auscultated. No needs at this time. Will continue to follow up per outreach protocol.     Signed By:   Zaire Headley RN    May 19, 2018 9:04 PM

## 2018-05-20 NOTE — PROGRESS NOTES
Reviewed notes due to [place] score of  Will follow as needed    Nabor Chapman, staff [address] [facility]  /   [email or web address]

## 2018-05-20 NOTE — DISCHARGE SUMMARY
Hospitalist Discharge Summary     Admit Date:  5/15/2018 10:58 PM   Name:  Sonam Dodge   Age:  81 y.o.  :  1945   MRN:  [id number]   PCP:  Jamie Tan MD  Treatment Team: Attending Provider: Fredy Dorsey MD; Consulting Provider: Robyn Falk MD; Utilization Review: Gilbert Hilario RN; Charge Nurse:  Felicity Bello RN; Care Manager: Felicity Bello RN    Problem List for this Hospitalization:  Hospital Problems as of 2018  Date Reviewed: 2018          Codes Class Noted - Resolved POA    COPD exacerbation ([facility] ICD-10-CM: J44.1  ICD-9-CM: 491.21  2018 - Present Yes        * (Principal)Pneumonia ICD-10-CM: J18.9  ICD-9-CM: 453  2018 - Present Yes        Chronic respiratory failure with hypoxia (HCC) (Chronic) ICD-10-CM: J96.11  ICD-9-CM: 518.83, 799.02  2018 - Present Yes        Anxiety (Chronic) ICD-10-CM: F41.9  ICD-9-CM: 300.00  2018 - Present Yes        Pancytopenia ([facility] (Chronic) ICD-10-CM: Y81.572  ICD-9-CM: 284.19  2018 - Present Yes        Sepsis ([facility] ICD-10-CM: A41.9  ICD-9-CM: 038.9, 995.91  2018 - Present Yes        Chronic pain (Chronic) ICD-10-CM: H47.62  ICD-9-CM: 338.29  2018 - Present Yes        Current chronic use of systemic steroids (Chronic) ICD-10-CM: Z79.52  ICD-9-CM: V58.65  2018 - Present Yes        Non-small cell lung cancer ([facility] (Chronic) ICD-10-CM: C34.90  ICD-9-CM: 162.9  5/15/2018 - Present Yes        Acquired hypothyroidism (Chronic) ICD-10-CM: E03.9  ICD-9-CM: 244.9  5/15/2018 - Present Yes                Admission HPI from 5/15/2018:    \" Ms. Armin Severe is a 82 yo female with PMH of non small cell lung cancer followed by oncology Dr. 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 Klaudia Peter with chronic hypoxic respiratory failure on 4 L NC evaluated with dyspnea and fever. She admits to troubles breathing several days, cough with minimal sputum, and chest wall pain, she is anxious and presently feels like she is unable to breathe well. CXR shows RLL mass (personally reviewed) , CT chest/ ABD/pelvis 3,2018 showed enlarging right lung mass with liver met. .3, and has received vancomycin and zosyn in the ED.- meets sepsis criteria due to fever and leukopenia.      Additional history is obtained from friend Pat at bedside      10 systems reviewed and negative except as noted in HPI. - has blurred vision, weight loss, constipation, joint pain, headaches, paresthesia, skin changes, mood changes,     \"    Hospital Course:  She was admitted and started on treatment for sepsis, pneumonia, and copd exacerbation. Pulmonology was consulted. The confirmed no further mgt for her cancer. She has improved enough to the point that she can go home. Still wheezing, still dyspneic but will have these symptoms at baseline. She is requesting to go home and the IDT is ok with that. She smells of smoke although she denies smoking - persons do smoke around her. Overall prognosis is poor. Pulmonology has done her home narcotics and steroids. Follow up instructions below. Plan was discussed with the patient. All questions answered. Patient was stable at time of discharge and was instructed to call or return if there are any concerns or recurrence of symptoms. Diagnostic Imaging/Tests:   Xr Chest Port    Result Date: 5/15/2018  Clinical history: Chest pain. TECHNIQUE: AP portable chest COMPARISON: April 25, 2018 FINDINGS: There is a large mass in the right lower lobe, similar to prior exam. No consolidation in the left lung. No pneumothorax or pulmonary edema. No large pleural effusion. Cardiac mediastinal contour is within normal limits. Bones are osteopenic. Stable right-sided chest port. IMPRESSION: 1. No evidence of acute pulmonary disease.  2. Right lower lobe mass, similar to prior exam.      Echocardiogram results:  No results found for this visit on 05/15/18. All Micro Results     Procedure Component Value Units Date/Time    CULTURE, BLOOD [[id number]] Collected:  05/16/18 0000    Order Status:  Completed Specimen:  Blood from Blood Updated:  05/20/18 0612     Special Requests: --        LEFT  Antecubital       Culture result: NO GROWTH 4 DAYS       CULTURE, BLOOD [[id number]] Collected:  05/16/18 0020    Order Status:  Completed Specimen:  Blood from Blood Updated:  05/20/18 0612     Special Requests: --        LEFT  HAND       Culture result: NO GROWTH 4 DAYS             Labs: Results:       BMP, Mg, Phos Recent Labs      05/20/18 0524 05/18/18   0554   NA  145  145   K  3.4*  4.0   CL  101  106   CO2  40*  35*   AGAP  4*  4*   BUN  14  19   CREA  0.51*  0.67   CA  8.5  8.3   GLU  140*  134*      CBC Recent Labs      05/20/18 0524 05/18/18   0554   WBC  3.2*  4.6   RBC  2.79*  2.66*   HGB  9.6*  9.1*   HCT  30.5*  29.6*   PLT  127*  117*      LFT No results for input(s): SGOT, ALT, TBIL, AP, TP, ALB, GLOB, AGRAT, GPT in the last 72 hours.    Cardiac Testing No results found for: BNPP, BNP, CPK, RCK1, RCK2, RCK3, RCK4, CKMB, CKNDX, CKND1, TROPT, TROIQ   Coagulation Tests Lab Results   Component Value Date/Time    Prothrombin time 10.4 07/25/2017 04:35 PM    Prothrombin time 10.0 08/06/2015 09:25 AM    INR 1.0 07/25/2017 04:35 PM    INR 0.9 08/06/2015 09:25 AM    aPTT 20.9 (L) 07/25/2017 04:35 PM    aPTT 24.5 08/06/2015 09:25 AM      A1c No results found for: HBA1C, HGBE8, OYP0VDOV   Lipid Panel No results found for: CHOL, CHOLPOCT, CHOLX, CHLST, CHOLV, 066262, HDL, LDL, LDLC, DLDLP, 453345, VLDLC, VLDL, TGLX, TRIGL, TRIGP, TGLPOCT, CHHD, CHHDX   Thyroid Panel Lab Results   Component Value Date/Time    T4, Total 8.7 01/23/2018 10:40 AM    TSH 8.070 (H) 01/23/2018 10:40 AM        Most Recent UA Lab Results   Component Value Date/Time    Color YELLOW 01/23/2018 12:02 PM    Appearance HAZY 01/23/2018 12:02 PM    pH (UA) 5.5 01/23/2018 12:02 PM    Protein NEGATIVE  01/23/2018 12:02 PM    Glucose NEGATIVE  01/23/2018 12:02 PM    Ketone NEGATIVE  01/23/2018 12:02 PM    Bilirubin NEGATIVE  01/23/2018 12:02 PM    Blood SMALL (A) 01/23/2018 12:02 PM    Urobilinogen 0.2 01/23/2018 12:02 PM    Nitrites POSITIVE (A) 01/23/2018 12:02 PM    Leukocyte Esterase MODERATE (A) 01/23/2018 12:02 PM        Allergies   Allergen Reactions    Iodinated Contrast- Oral And Iv Dye Nausea and Vomiting    Sulfa (Sulfonamide Antibiotics) Other (comments)     Skin burning, nausea vomiting    Zofran [Ondansetron Hcl (Pf)] Nausea and Vomiting     Immunization History   Administered Date(s) Administered    Influenza Vaccine 09/22/2014, 10/16/2015, 08/01/2016, 09/01/2017    Influenza Vaccine (Quad) 09/02/2016    Influenza Vaccine (Quad) Ped PF 11/10/2017    Pneumococcal Conjugate (PCV-13) 09/22/2014, 10/16/2015    Pneumococcal Vaccine (Unspecified Type) 01/01/2014    TB Skin Test (PPD) 01/01/2015    TB Skin Test (PPD) Intradermal 05/16/2018       All Labs from Last 24 Hrs:  Recent Results (from the past 24 hour(s))   CBC W/O DIFF    Collection Time: 05/20/18  5:24 AM   Result Value Ref Range    WBC 3.2 (L) 4.3 - 11.1 K/uL    RBC 2.79 (L) 4.05 - 5.25 M/uL    HGB 9.6 (L) 11.7 - 15.4 g/dL    HCT 30.5 (L) 35.8 - 46.3 %    .3 (H) 79.6 - 97.8 FL    MCH 34.4 (H) 26.1 - 32.9 PG    MCHC 31.5 31.4 - 35.0 g/dL    RDW 15.6 (H) 11.9 - 14.6 %    PLATELET 468 (L) 547 - 450 K/uL    MPV 8.8 (L) 10.8 - 97.2 FL   METABOLIC PANEL, BASIC    Collection Time: 05/20/18  5:24 AM   Result Value Ref Range    Sodium 145 136 - 145 mmol/L    Potassium 3.4 (L) 3.5 - 5.1 mmol/L    Chloride 101 98 - 107 mmol/L    CO2 40 (H) 21 - 32 mmol/L    Anion gap 4 (L) 7 - 16 mmol/L    Glucose 140 (H) 65 - 100 mg/dL    BUN 14 8 - 23 MG/DL    Creatinine 0.51 (L) 0.6 - 1.0 MG/DL    GFR est AA >60 >60 ml/min/1.73m2 GFR est non-AA >60 >60 ml/min/1.73m2    Calcium 8.5 8.3 - 10.4 MG/DL       Discharge Exam:  Patient Vitals for the past 24 hrs:   Temp Pulse Resp BP SpO2   05/20/18 0825 - - - - 98 %   05/20/18 0727 98.3 °F (36.8 °C) 68 18 138/69 98 %   05/20/18 0344 - - - - 95 %   05/20/18 0034 - 100 28 135/74 94 %   05/19/18 2328 98.2 °F (36.8 °C) (!) 112 26 120/51 94 %   05/19/18 2300 - - - - 100 %   05/19/18 2010 97.9 °F (36.6 °C) 87 21 120/57 100 %   05/19/18 1952 - - - - 100 %   05/19/18 1550 97.9 °F (36.6 °C) 81 18 114/64 99 %   05/19/18 1527 - - - - 98 %   05/19/18 1217 - - - - 92 %   05/19/18 1215 98.1 °F (36.7 °C) 88 22 124/68 94 %   05/19/18 1200 98 °F (36.7 °C) (!) 123 29 - (!) 77 %   05/19/18 1138 98.5 °F (36.9 °C) 65 19 144/78 96 %   05/19/18 1124 - - - - 100 %     Oxygen Therapy  O2 Sat (%): 98 % (05/20/18 0825)  Pulse via Oximetry: 97 beats per minute (05/19/18 1952)  O2 Device: Hi flow nasal cannula (05/20/18 0825)  O2 Flow Rate (L/min): 4 l/min (05/20/18 0825)  FIO2 (%): 36 % (05/19/18 1527)  ETCO2 (mmHg): 4 mmHg (05/16/18 0131)  No intake or output data in the 24 hours ending 05/20/18 0909    General:    Well nourished. Alert. No distress. Smells of smoke  Eyes:   Normal sclera. Extraocular movements intact. ENT:  Normocephalic, atraumatic. Moist mucous membranes  CV:   Regular rate and rhythm. No murmur, rub, or gallop. Lungs:  Equal to auscultation bilaterally. Bilateral wheezing throughout, rhonchi, or rales. Abdomen: Soft, nontender, nondistended. Bowel sounds normal.   Extremities: Warm and dry. No cyanosis or edema. Neurologic: CN II-XII grossly intact. Sensation intact. Skin:     No rashes or jaundice. Psych:  Normal mood and affect. Discharge Info:   Current Discharge Medication List      START taking these medications    Details   morphine 10 mg/5 mL oral solution Take 1.25 mL by mouth every eight (8) hours as needed (severe dyspnea).  Max Daily Amount: 7.5 mg.  Qty: 50 mL, Refills: 0 Associated Diagnoses: SOB (shortness of breath); Chronic respiratory failure with hypoxia ([facility] Non-small cell lung cancer, unspecified laterality (HCC)      doxycycline (VIBRAMYCIN) 100 mg capsule Take 1 Cap by mouth every twelve (12) hours for 5 days. Qty: 10 Cap, Refills: 0      aspirin delayed-release 81 mg tablet Take 1 Tab by mouth daily. Qty: 30 Tab, Refills: 0      Saccharomyces boulardii (FLORASTOR) 250 mg capsule Take 1 Cap by mouth two (2) times a day for 7 days. Qty: 14 Cap, Refills: 0         CONTINUE these medications which have CHANGED    Details   ! ! predniSONE (DELTASONE) 10 mg tablet 2 tabs po qd x 3 days, 1 and 1/2 tabs po qd x 3 days, 1 tab po qd x 3 days, then resume 5mg tablets daily. Qty: 15 Tab, Refills: 0      guaiFENesin ER (MUCINEX) 600 mg ER tablet Take 2 Tabs by mouth two (2) times a day. Qty: 60 Tab, Refills: 0       !! - Potential duplicate medications found. Please discuss with provider. CONTINUE these medications which have NOT CHANGED    Details   HYDROcodone-acetaminophen (NORCO)  mg tablet Take 0.5-1 Tabs by mouth every six (6) hours as needed for Pain. Max Daily Amount: 4 Tabs. Qty: 60 Tab, Refills: 0    Associated Diagnoses: Non-small cell lung cancer, unspecified laterality (HCC)      potassium chloride (K-DUR, KLOR-CON) 20 mEq tablet Take 1 Tab by mouth daily. Qty: 30 Tab, Refills: 2    Associated Diagnoses: Non-small cell lung cancer, unspecified laterality ([facility]      fluticasone-vilanterol (BREO ELLIPTA) 200-25 mcg/dose inhaler Take 1 Puff by inhalation daily. RINSE MOUTH WELL AFTER USE  Qty: 1 Inhaler, Refills: 11      albuterol (PROVENTIL HFA, VENTOLIN HFA, PROAIR HFA) 90 mcg/actuation inhaler Take 2 Puffs by inhalation every four (4) hours as needed for Wheezing.  Indications: Chronic Obstructive Pulmonary Disease  Qty: 1 Inhaler, Refills: 11      albuterol (PROVENTIL VENTOLIN) 2.5 mg /3 mL (0.083 %) nebulizer solution 3 mL by Nebulization route every four (4) hours as needed for Wheezing or Shortness of Breath (cough). COPD, lung Ca, Medicare 4  Qty: 120 Each, Refills: 6      !! predniSONE (DELTASONE) 5 mg tablet Take 1 Tab by mouth daily (with breakfast). Qty: 90 Tab, Refills: 1      oxyCODONE IR (ROXICODONE) 10 mg tab immediate release tablet Take 1 Tab by mouth every three (3) hours as needed. Max Daily Amount: 80 mg.  Qty: 60 Tab, Refills: 0    Associated Diagnoses: Non-small cell lung cancer, unspecified laterality ([facility] Pain      omeprazole (PRILOSEC) 40 mg capsule Take 1 Cap by mouth daily. Qty: 30 Cap, Refills: 2      rosuvastatin (CRESTOR) 20 mg tablet Take  by mouth. 1/2 tablet daily      levothyroxine (SYNTHROID) 88 mcg tablet Take  by mouth Daily (before breakfast). OXYGEN-AIR DELIVERY SYSTEMS by Does Not Apply route nightly. 3 lpm cont. LORazepam (ATIVAN) 1 mg tablet Take 0.5 Tabs by mouth every six (6) hours as needed for Anxiety. Max Daily Amount: 2 mg. Qty: 30 Tab, Refills: 0      zolpidem (AMBIEN) 10 mg tablet Take 10 mg by mouth nightly as needed. citalopram (CELEXA) 40 mg tablet Take 40 mg by mouth daily. HYDROcodone-homatropine (HYCODAN) 5-1.5 mg/5 mL (5 mL) syrup Take 5 mL by mouth four (4) times daily. Max Daily Amount: 20 mL. Qty: 300 mL, Refills: 0    Associated Diagnoses: Non-small cell lung cancer, unspecified laterality (HCC)      lidocaine-prilocaine (EMLA) topical cream Apply  to affected area as needed. Apply to port site 30 minutes to 1 hour prior to port access  Qty: 30 g, Refills: 0    Associated Diagnoses: Non-small cell lung cancer, right ([facility]       ! ! - Potential duplicate medications found. Please discuss with provider.       STOP taking these medications       mirtazapine (REMERON) 15 mg tablet Comments:   Reason for Stopping:         aspirin 81 mg CpDR Comments:   Reason for Stopping:                 Disposition: home with home health services  Activity: as tolerated  Diet: DIET REGULAR    Follow-up Appointments   Procedures    FOLLOW UP VISIT Appointment in: Other (Valere Bernheim) As scheduled with [facility]     As scheduled with [facility]     Standing Status:   Standing     Number of Occurrences:   1     Order Specific Question:   Appointment in     Answer: Other (Specify)         Follow-up Information     Follow up With Details Comments Contact [facility]    [address] [address]  [address] [facility] [address] [facility] [address] [facility] In 2 weeks  [address] [address]  [phone]    Gurdeep Fernandez MD In 2 weeks  [address] [place] [address]  [phone]            Time spent in patient discharge planning and coordination 35 minutes.     Signed:  Yaneth Benitez MD

## 2018-05-20 NOTE — PROGRESS NOTES
Date of Outreach Update:  Harrison Traylor was seen and assessed. MEWS Score: 4 (05/19/18 2328)  Vitals:    05/19/18 2300 05/19/18 2328 05/20/18 0034 05/20/18 0344   BP:  120/51 135/74    Pulse:  (!) 112 100    Resp:  26 28    Temp:  98.2 °F (36.8 °C)     SpO2: 100% 94% 94% 95%   Weight:       Height:             Pain Assessment  Pain Intensity 1: 0 (05/20/18 0625)  Pain Location 1: Generalized  Pain Intervention(s) 1: Medication (see MAR)  Patient Stated Pain Goal: 0      Previous Outreach assessment has been reviewed. There have been no significant clinical changes since the completion of the last dated Outreach assessment. Will continue to follow up per outreach protocol.     Signed By:   Elner Nageotte, RN    May 20, 2018 6:36 AM

## 2018-05-20 NOTE — PROGRESS NOTES
CM met with patient and discussed d/c needs. Patient states she have support this week but unsure what she's going to do next week when everyone leaves and go back home. Patient states she is currently unable to drive. States spouse was able to take her to appointments but spouse will be working a part time job and spouse will be receiving treatment from liver cancer. Patient states she currently have home O2 and uses 3 1/2 liters and [facility] is the provider. Patient will be d/c home with [facility].

## 2018-05-20 NOTE — PROGRESS NOTES
Daiana King  Admission Date: 5/15/2018             Daily Progress Note: 5/20/2018    The patient's chart is reviewed and the patient is discussed with the staff.     Admitted with pneumonia and COPD exacerbation. Chronic hypoxia with home oxygen - 4 liters. Has metastatic lung cancer - no current rx. Subjective: On home oxygen flow and not wheezing. Really wants to go home today.     Current Facility-Administered Medications   Medication Dose Route Frequency    morphine injection 1 mg  1 mg IntraVENous Q2H PRN    doxycycline (VIBRAMYCIN) capsule 100 mg  100 mg Oral Q12H    methylPREDNISolone (PF) (SOLU-MEDROL) injection 40 mg  40 mg IntraVENous Q12H    aspirin delayed-release tablet 81 mg  81 mg Oral DAILY    citalopram (CELEXA) tablet 40 mg  40 mg Oral DAILY    guaiFENesin ER (MUCINEX) tablet 1,200 mg  1,200 mg Oral BID    levothyroxine (SYNTHROID) tablet 88 mcg  88 mcg Oral ACB    LORazepam (ATIVAN) tablet 0.5 mg  0.5 mg Oral Q6H PRN    mirtazapine (REMERON) tablet 15 mg  15 mg Oral QHS    pantoprazole (PROTONIX) tablet 40 mg  40 mg Oral ACB    rosuvastatin (CRESTOR) tablet 10 mg  10 mg Oral QHS    zolpidem (AMBIEN) tablet 5 mg  5 mg Oral QHS PRN    sodium chloride (NS) flush 5-10 mL  5-10 mL IntraVENous Q8H    sodium chloride (NS) flush 5-10 mL  5-10 mL IntraVENous PRN    acetaminophen (TYLENOL) tablet 650 mg  650 mg Oral Q6H PRN    oxyCODONE-acetaminophen (PERCOCET 7.5) 7.5-325 mg per tablet 1 Tab  1 Tab Oral Q4H PRN    naloxone (NARCAN) injection 0.4 mg  0.4 mg IntraVENous PRN    ondansetron (ZOFRAN) injection 4 mg  4 mg IntraVENous Q4H PRN    enoxaparin (LOVENOX) injection 40 mg  40 mg SubCUTAneous Q24H    budesonide (PULMICORT) 500 mcg/2 ml nebulizer suspension  500 mcg Nebulization BID RT    hydrALAZINE (APRESOLINE) 20 mg/mL injection 10 mg  10 mg IntraVENous Q6H PRN    phenol throat spray (CHLORASEPTIC) 1 Spray  1 Spray Oral PRN    albuterol (PROVENTIL VENTOLIN) nebulizer solution 2.5 mg  2.5 mg Nebulization Q4H RT    albuterol (PROVENTIL VENTOLIN) nebulizer solution 2.5 mg  2.5 mg Nebulization Q4H PRN       Review of Systems    Constitutional: negative for fever, chills, sweats  Cardiovascular: negative for chest pain, palpitations, syncope, edema  Gastrointestinal:  negative for dysphagia, reflux, vomiting, diarrhea, abdominal pain, or melena  Neurologic:  negative for focal weakness, numbness, headache    Objective:     Vitals:    05/19/18 2300 05/19/18 2328 05/20/18 0034 05/20/18 0344   BP:  120/51 135/74    Pulse:  (!) 112 100    Resp:  26 28    Temp:  98.2 °F (36.8 °C)     SpO2: 100% 94% 94% 95%   Weight:       Height:         Intake and Output:           Physical Exam:   Constitution:  the patient is well developed and in no acute distress  EENMT:  Sclera clear, pupils equal, oral mucosa moist  Respiratory: CTAB currently  Cardiovascular:  RRR without M,G,R  Gastrointestinal: soft and non-tender; with positive bowel sounds. Musculoskeletal: warm without cyanosis. There is no lower leg edema.   Skin:  no jaundice or rashes, no wounds   Neurologic: no gross neuro deficits     Psychiatric:  alert and oriented x 3    CXR:          Recent Labs      05/20/18   0524  05/18/18   0554   WBC  3.2*  4.6   HGB  9.6*  9.1*   HCT  30.5*  29.6*   PLT  127*  117*     Recent Labs      05/20/18   0524  05/18/18   0554   NA  145  145   K  3.4*  4.0   CL  101  106   CO2  40*  35*   GLU  140*  134*   BUN  14  19   CREA  0.51*  0.67   CA  8.5  8.3         Assessment:  (Medical Decision Making)     Hospital Problems  Date Reviewed: 5/16/2018          Codes Class Noted POA    COPD exacerbation ([facility] ICD-10-CM: J44.1  ICD-9-CM: 491.21  5/16/2018 Yes    Resolved, steroid taper ordered    * (Principal)Pneumonia ICD-10-CM: J18.9  ICD-9-CM: 252  5/16/2018 Yes    Continue doxy to complete course    Chronic respiratory failure with hypoxia (HCC) (Chronic) ICD-10-CM: J96.11  ICD-9-CM: 518.83, 799.02  5/16/2018 Yes    Resume home O2    Anxiety (Chronic) ICD-10-CM: F41.9  ICD-9-CM: 300.00  5/16/2018 Yes        Pancytopenia (HCC) (Chronic) ICD-10-CM: L47.110  ICD-9-CM: 284.19  5/16/2018 Yes        Sepsis ([facility] ICD-10-CM: A41.9  ICD-9-CM: 038.9, 995.91  5/16/2018 Yes        Chronic pain (Chronic) ICD-10-CM: L96.30  ICD-9-CM: 338.29  5/16/2018 Yes        Current chronic use of systemic steroids (Chronic) ICD-10-CM: Z79.52  ICD-9-CM: V58.65  5/16/2018 Yes    Prednisone 5mg to continue after taper    Non-small cell lung cancer ([facility] (Chronic) ICD-10-CM: C34.90  ICD-9-CM: 162.9  5/15/2018 Yes    metastatic    Acquired hypothyroidism (Chronic) ICD-10-CM: E03.9  ICD-9-CM: 244.9  5/15/2018 Yes              Plan:  (Medical Decision Making)   --recommend discharge today  -- Doxy for remainder of course  -- prednisone taper ordered and should taper down to 5mg she continuously takes. --continue nebs, home 02  --will likely need roxanol at discharge for symptomatic control of dyspnea and cough at home. I will give patient this prescription now as I just reviewed the [facility] website. Please do not continue other narcotics aside from the norco 10mg which is actively listed in [address] [place] [address] [address]. More than 50% of the time documented was spent in face-to-face contact with the patient and in the care of the patient on the floor/unit where the patient is located.     Frederick Alvarez MD

## 2018-05-20 NOTE — PROGRESS NOTES
Date of Outreach Update:  Bj Enriquez was seen and assessed. MEWS Score: 4 (05/19/18 2328)  Vitals:    05/19/18 1952 05/19/18 2010 05/19/18 2300 05/19/18 2328   BP:  120/57  120/51   Pulse:  87  (!) 112   Resp:  21 26   Temp:  97.9 °F (36.6 °C)  98.2 °F (36.8 °C)   SpO2: 100% 100% 100% 94%   Weight:       Height:             Pain Assessment  Pain Intensity 1: 0 (05/19/18 0746)  Pain Location 1: Generalized  Pain Intervention(s) 1: Repositioned, Rest  Patient Stated Pain Goal: 0      Previous Outreach assessment has been reviewed. There have been no significant clinical changes since the completion of the last dated Outreach assessment. Will continue to follow up per outreach protocol.     Signed By:   Kelsea Winter RN    May 20, 2018 12:41 AM

## 2018-05-21 NOTE — PROGRESS NOTES
This note will not be viewable in [address] Date/Time of Call:   05/21/2018 2:25pm   What was the patient hospitalized for? Sepsis/Pneumonia/COPD exacerbation    Hx of non small cell lung cancer   Does the patient understand his/her diagnosis and/or treatment and what happened during the hospitalization? Spoke with patient, who verbalized understanding of Dx and Tx plan. Reports she is doing well, denies any new problems. Did the patient receive discharge instructions? yes   CM Assessed Risk for Readmission:      Patient stated Risk for Readmission:       High risk for readmission due to further complications from COPD, lung cancer and comorbidities. Per patient, more problems with my breathing or cancer. Review any discharge instructions ([facility] Ask patient if they understand these. Do they have any questions? Discharge instructions reviewed with patient, who verbalized understanding. Focused on education, follow up appointments and med compliance. Were home services ordered (nursing, PT, OT, ST, etc.)? Yes   If so, has the first visit occurred? If not, why? (Assist with coordination of services if necessary.)   Yes   Was any DME ordered? No   If so, has it been received? If not, why?  (Assist patient in obtaining DME orders &/or equipment if necessary.) N/A   Complete a review of all medications (new, continued and discontinued meds per the D/C instructions and medication tab in [facility]). Medication review completed with patient, who verbalized understanding. Were all new prescriptions filled? If not, why?  (Assist patient in obtaining medications if necessary  escalate for CCM &/or SW if ongoing issues are verbalized by pt or anticipated)   All new prescriptions were filled and in the home. Patient denies any problems in obtaining medications.      Does the patient understand the purpose and dosing instructions for all medications? (If patient has questions, provide explanation and education.)   Patient verbalized understanding of purpose for and dosing of medications. Does the patient have any problems in performing ADLs? (If patient is unable to perform ADLs  what is the limiting factor(s)? Do they have a support system that can assist? If no support system is present, discuss possible assistance that they may be able to obtain. Escalate for CCM/SW if ongoing issues are verbalized by pt or anticipated)   Patient is mostly independent with ADLs but requires assistance due to SOB. Patient reports family able to help for now, but reports spouse is limited due to ongoing tx for liver cancer and still works part time. Does the patient have all follow-up appointments scheduled? 7 day f/up with PCP?   (f/up with PCP may be w/in 14 days if patient has a f/up with their specialist w/in 7 days)    7-14 day f/up with specialist?   (or per discharge instructions)    If f/up has not been made  what actions has the care coordinator made to accomplish this? Has transportation been arranged? Patient has follow up with Dr Klaudia Peter, pulmonologist, on 6/2/18. Also has an appointment with Dr. Brenda Mcclain on 5/29/18. Patient does not have an appointment with PCP. Educated on importance of follow up within 1 week. Patient declined to have CC schedule appointment but states will call if problems develop. Patient denies any problems with transportation to visits at this time. Any other questions or concerns expressed by the patient? No other questions or concerns verbalized. Discussed referral to CCM for follow up, but patient declined. Patient was agreeable to follow up by Care Coordinator. Schedule next appointment with [facility] Coordinator or refer to RN Case Manager/ per the workflow guidelines. When is care coordinators next follow-up call scheduled?       If referred for CCM  what RN care manager was the referral assigned? Patient will be followed by CC. Plan for next follow up in bout 2 weeks. Patient is followed by oncology navigator, is oncology bundle patient. Patient has Care Coordinator contact information and was advised to call for any new concerns or needs.    KIMMY Call Completed By: Brianda Hatch LPN, [facility] Coordinator

## 2018-06-05 NOTE — PROGRESS NOTES
This note will not be viewable in [address] KIMMY follow up call. No answer at home number, left VM to call for any new concerns or questions. Patient has been seen by oncologist for follow up. Will close case as patient as no new needs and no further KIMMY outreach indicated. Will reopen case if return call received.

## 2018-06-30 NOTE — ED NOTES
I have reviewed discharge instructions with the patient. The patient verbalized understanding. Patient left ED via Discharge Method: wheelchair to Home with family. Opportunity for questions and clarification provided. Patient given 0 scripts. To continue your aftercare when you leave the hospital, you may receive an automated call from our care team to check in on how you are doing. This is a free service and part of our promise to provide the best care and service to meet your aftercare needs.  If you have questions, or wish to unsubscribe from this service please call [phone]. Thank you for Choosing our [facility].

## 2018-06-30 NOTE — ED TRIAGE NOTES
PT arrived to ED via EMS c/o SOB. PT was given 125 mg solumedrol. 3.5 mg dueneb, 2 g Mag, and 1 mg ativan.

## 2018-06-30 NOTE — DISCHARGE INSTRUCTIONS
Learning About Saving Energy When You Have a Chronic Condition  Introduction    Everyday tasks can be tiring when you have COPD, heart failure, or another long-term (chronic) condition. You may feel at times that you've lost your ability to live your life. But learning to conserve, or save, your energy can help you be less tired. Conserving your energy means finding ways of doing daily activities with as little effort as possible. With some small changes in the way you do things, you can get your tasks done more easily. Some treatments are available that might help. Pulmonary rehabilitation can teach you ways to breathe easier. Cardiac rehabilitation can help make your heart stronger. You also may want to see an occupational or physical therapist. The therapist can give you more tips on building strength and moving with less effort. What can you do to conserve your energy? Planning  · Make a list of what you have to do every day. Group the tasks by location. · Do all the chores in one part of your house around the same time. · Go out for errands or do chores at the time of day when you have the most energy. · Plan rest periods into your day. Getting things done  · Sit down as often as you can when you get dressed, do chores, or cook. · Use a cart with wheels to roll items, such as laundry, from one room to another. · Push or slide boxes or other large items instead of lifting them. Reaching and bending  · Put things you use the most on shelves that are at the level of your waist or shoulder. · Use long-handled grabbers or other tools to reach items on a high shelf or to  things off the floor. Use long-handled dusters when you clean the house. · Use a raised toilet seat to avoid bending too far to sit or stand up. Eating  · Eat several small meals instead of three larger meals. · If you get too tired to eat much, try to choose healthy foods that have more calories.  Have a yogurt-and-fruit smoothie for breakfast. Put avocado on a sandwich. Or add cheese or peanut butter to snacks. · If you don't feel very hungry, try to eat first and drink water or other fluids later, after a meal. This can help keep you from losing weight. Sip small amounts of fluids if you need to drink while you eat. Having sex  · Choose the time of day when you have more energy. · A bgan-rw-poec position for sex can be less tiring. Sometimes you may want to focus more on caressing. Watch closely for changes in your health, and be sure to contact your doctor if you have any problems. Where can you learn more? Go to http://josegopogokatarzyna.info/. Enter H190 in the search box to learn more about \"Learning About Saving Energy When You Have a Chronic Condition. \"  Current as of: May 12, 2017  Content Version: 11.4  © 6031-8678 [employer]. Care instructions adapted under license by [employer] (which disclaims liability or warranty for this information). If you have questions about a medical condition or this instruction, always ask your healthcare professional. [place] [address] any warranty or liability for your use of this information. Chronic Obstructive Pulmonary Disease (COPD): Care Instructions  Your Care Instructions    Chronic obstructive pulmonary disease (COPD) is a general term for a group of lung diseases, including emphysema and chronic bronchitis. People with COPD have decreased airflow in and out of the lungs, which makes it hard to breathe. The airways also can get clogged with thick mucus. Cigarette smoking is a major cause of COPD. Although there is no cure for COPD, you can slow its progress. Following your treatment plan and taking care of yourself can help you feel better and live longer. Follow-up care is a key part of your treatment and safety. Be sure to make and go to all appointments, and call your doctor if you are having problems.  It's also a good idea to know your test results and keep a list of the medicines you take. How can you care for yourself at home? ?Staying healthy  ? · Do not smoke. This is the most important step you can take to prevent more damage to your lungs. If you need help quitting, talk to your doctor about stop-smoking programs and medicines. These can increase your chances of quitting for good. ? · Avoid colds and flu. Get a pneumococcal vaccine shot. If you have had one before, ask your doctor whether you need a second dose. Get the flu vaccine every fall. If you must be around people with colds or the flu, wash your hands often. ? · Avoid secondhand smoke, air pollution, and high altitudes. Also avoid cold, dry air and hot, humid air. Stay at home with your windows closed when air pollution is bad. ?Medicines and oxygen therapy  ? · Take your medicines exactly as prescribed. Call your doctor if you think you are having a problem with your medicine. ? · You may be taking medicines such as:  ¨ Bronchodilators. These help open your airways and make breathing easier. Bronchodilators are either short-acting (work for 6 to 9 hours) or long-acting (work for 24 hours). You inhale most bronchodilators, so they start to act quickly. Always carry your quick-relief inhaler with you in case you need it while you are away from home. ¨ Corticosteroids (prednisone, budesonide). These reduce airway inflammation. They come in pill or inhaled form. You must take these medicines every day for them to work well. ? · A spacer may help you get more inhaled medicine to your lungs. Ask your doctor or pharmacist if a spacer is right for you. If it is, ask how to use it properly. ? · Do not take any vitamins, over-the-counter medicine, or herbal products without talking to your doctor first.   ? · If your doctor prescribed antibiotics, take them as directed. Do not stop taking them just because you feel better.  You need to take the full course of antibiotics. ? · Oxygen therapy boosts the amount of oxygen in your blood and helps you breathe easier. Use the flow rate your doctor has recommended, and do not change it without talking to your doctor first.   Activity  ? · Get regular exercise. Walking is an easy way to get exercise. Start out slowly, and walk a little more each day. ? · Pay attention to your breathing. You are exercising too hard if you cannot talk while you are exercising. ? · Take short rest breaks when doing household chores and other activities. ? · Learn breathing methods-such as breathing through pursed lips-to help you become less short of breath. ? · If your doctor has not set you up with a pulmonary rehabilitation program, talk to him or her about whether rehab is right for you. Rehab includes exercise programs, education about your disease and how to manage it, help with diet and other changes, and emotional support. Diet  ? · Eat regular, healthy meals. Use bronchodilators about 1 hour before you eat to make it easier to eat. Eat several small meals instead of three large ones. Drink beverages at the end of the meal. Avoid foods that are hard to chew. ? · Eat foods that contain protein so that you do not lose muscle mass. ? · Talk with your doctor if you gain too much weight or if you lose weight without trying. ?Mental health  ? · Talk to your family, friends, or a therapist about your feelings. It is normal to feel frightened, angry, hopeless, helpless, and even guilty. Talking openly about bad feelings can help you cope. If these feelings last, talk to your doctor. When should you call for help? Call 911 anytime you think you may need emergency care. For example, call if:  ? · You have severe trouble breathing. ?Call your doctor now or seek immediate medical care if:  ? · You have new or worse trouble breathing. ? · You cough up blood. ? · You have a fever. ? Watch closely for changes in your health, and be sure to contact your doctor if:  ? · You cough more deeply or more often, especially if you notice more mucus or a change in the color of your mucus. ? · You have new or worse swelling in your legs or belly. ? · You are not getting better as expected. Where can you learn more? Go to http://jose-katarzyna.info/. Fabrice Brooms in the search box to learn more about \"Chronic Obstructive Pulmonary Disease (COPD): Care Instructions. \"  Current as of: May 12, 2017  Content Version: 11.4  © 0527-5506 [employer] Care instructions adapted under license by [employer] (which disclaims liability or warranty for this information). If you have questions about a medical condition or this instruction, always ask your healthcare professional. [address] any warranty or liability for your use of this information.

## 2018-06-30 NOTE — ED PROVIDER NOTES
HPI Comments: 60-year-old female with acute shortness of breath. Patient has a history of lung cancer and COPD oxygen dependent at home having shortness of breath of the last 24 hours. She is taking multiple albuterol nebulizer treatments with minimal relief. EMS gave her additional albuterol magnesium and Ativan. Patient states he feels much better or likely discharged while they were transferring her to the bed    Patient is a 81 y.o. female presenting with shortness of breath. The history is provided by the patient. Shortness of Breath   This is a chronic problem. The problem occurs continuously. The problem has been rapidly improving. Associated symptoms include cough and sputum production. Pertinent negatives include no fever.         Past Medical History:   Diagnosis Date    Acquired hypothyroidism 12/30/2016    Cancer ([facility] [address]     non-hodgkins lymphoma, lung ca     Cancer of lung ([facility] [address]     liver mets    Chronic pain     COPD     Hypercholesterolemia     Other ill-defined conditions(799.89)      hypothyroid    Sepsis ([facility] [address] 5/16/2018    Symptomatic cholelithiasis 12/30/2016    Unspecified sleep apnea     uses o2 at night       Past Surgical History:   Procedure Laterality Date    HX OTHER SURGICAL      removed mass from right elbow    HX VASCULAR ACCESS           Family History:   Problem Relation Age of Onset    Other Mother      lupus, breast ca    Other Father      DM    Other Maternal Aunt      ovarian ca       Social History     Social History    Marital status:      Spouse name: N/A    Number of children: N/A    Years of education: N/A     Occupational History    unknown      [employer]     Social History Main Topics    Smoking status: Former Smoker     Packs/day: 1.00     Years: 48.00     Types: Cigarettes     Quit date: 8/21/2015    Smokeless tobacco: Never Used    Alcohol use No    Drug use: No    Sexual activity: Yes     Partners: Male     Other Topics Concern    Not on file     Social History Narrative    Former beautician, 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 PPD negative in 2012                 ALLERGIES: Iodinated contrast- oral and iv dye; Sulfa (sulfonamide antibiotics); and Zofran [ondansetron hcl (pf)]    Review of Systems   Constitutional: Negative. Negative for activity change and fever. HENT: Negative. Eyes: Negative. Respiratory: Positive for cough, sputum production and shortness of breath. Cardiovascular: Negative. Gastrointestinal: Negative. Genitourinary: Negative. Musculoskeletal: Negative. Skin: Negative. Neurological: Negative. Psychiatric/Behavioral: Negative. All other systems reviewed and are negative. Vitals:    06/30/18 0041   BP: 119/58   Pulse: (!) 108   Resp: 18   Temp: 98.8 °F (37.1 °C)   SpO2: 91%   Weight: 61.2 kg (135 lb)   Height: 5' 4\" (1.626 m)            Physical Exam   Constitutional: She is oriented to person, place, and time. She appears well-developed and well-nourished. No distress. HENT:   Head: Normocephalic and atraumatic. Right Ear: External ear normal.   Left Ear: External ear normal.   Nose: Nose normal.   Mouth/Throat: Oropharynx is clear and moist. No oropharyngeal exudate. Eyes: Conjunctivae and EOM are normal. Pupils are equal, round, and reactive to light. Right eye exhibits no discharge. Left eye exhibits no discharge. No scleral icterus. Neck: Normal range of motion. Neck supple. No JVD present. No tracheal deviation present. Cardiovascular: Normal rate, regular rhythm and intact distal pulses. Pulmonary/Chest: Effort normal and breath sounds normal. No stridor. No respiratory distress. She has no wheezes. She exhibits no tenderness. Abdominal: Soft. Bowel sounds are normal. She exhibits no distension and no mass. There is no tenderness. Musculoskeletal: Normal range of motion. She exhibits no edema or tenderness.    Neurological: She is alert and oriented to person, place, and time. No cranial nerve deficit. Skin: Skin is warm and dry. No rash noted. She is not diaphoretic. No erythema. No pallor. Psychiatric: She has a normal mood and affect. Her behavior is normal. Thought content normal.   Nursing note and vitals reviewed. MDM  Number of Diagnoses or Management Options  Diagnosis management comments: Patient insisting that she will not be admitted if she is going home. Patient is confident.   The patient appears much better no distress SaO2 on 2 L is 95%       Amount and/or Complexity of Data Reviewed  Clinical lab tests: ordered and reviewed  Tests in the radiology section of CPT®: ordered and reviewed  Tests in the medicine section of CPT®: ordered and reviewed  Independent visualization of images, tracings, or specimens: yes          ED Course       Procedures

## 2018-07-30 NOTE — ED PROVIDER NOTES
HPI Comments: 74-year-old female complaining of shortness of breath. Patient has a long history of COPD she also has cancer of the lung And is on hospice. Required increased amount of oxygen at home Patient is a 81 y.o. female presenting with shortness of breath. The history is provided by the patient. Shortness of Breath This is a recurrent problem. The current episode started 3 to 5 hours ago. Associated symptoms include cough. Pertinent negatives include no fever, no headaches, no coryza, no rhinorrhea and no sore throat. It is unknown what precipitated the problem. She has tried nothing for the symptoms. Associated medical issues include COPD and chronic lung disease. Past Medical History:  
Diagnosis Date  Acquired hypothyroidism 12/30/2016  Cancer ([facility] [address]   
 non-hodgkins lymphoma, lung ca  Cancer of lung ([facility] [address] liver mets  Chronic pain  COPD  Hypercholesterolemia  Other ill-defined conditions(799.89) hypothyroid  Sepsis ([facility] [address] 5/16/2018  Symptomatic cholelithiasis 12/30/2016  Unspecified sleep apnea   
 uses o2 at night Past Surgical History:  
Procedure Laterality Date  HX OTHER SURGICAL    
 removed mass from right elbow  HX VASCULAR ACCESS Family History:  
Problem Relation Age of Onset  Other Mother   
  lupus, breast ca  Other Father DM  Other Maternal Aunt   
  ovarian ca Social History Social History  Marital status:  Spouse name: N/A  
 Number of children: N/A  
 Years of education: N/A Occupational History  unknown   
  [employer] Social History Main Topics  Smoking status: Former Smoker Packs/day: 1.00 Years: 48.00 Types: Cigarettes Quit date: 8/21/2015  Smokeless tobacco: Never Used  Alcohol use No  
 Drug use: No  
 Sexual activity: Yes  
  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 Negative for activity change and fever. HENT: Negative. Negative for rhinorrhea and sore throat. Eyes: Negative. Respiratory: Positive for cough and shortness of breath. Cardiovascular: Negative. Gastrointestinal: Negative. Genitourinary: Negative. Musculoskeletal: Negative. Skin: Negative. Neurological: Negative. Negative for headaches. Psychiatric/Behavioral: Negative. All other systems reviewed and are negative. Vitals:  
 07/30/18 0038 BP: 118/64 Pulse: 99 Resp: 16 Temp: 98.1 °F (36.7 °C) SpO2: 100% Weight: 61.7 kg (136 lb) Height: 5' 4\" (1.626 m) Physical Exam  
Constitutional: She is oriented to person, place, and time. She appears well-developed and well-nourished. No distress. HENT:  
Head: Normocephalic and atraumatic. Right Ear: External ear normal.  
Left Ear: External ear normal.  
Nose: Nose normal.  
Mouth/Throat: Oropharynx is clear and moist. No oropharyngeal exudate. Eyes: Conjunctivae and EOM are normal. Pupils are equal, round, and reactive to light. Right eye exhibits no discharge. Left eye exhibits no discharge. No scleral icterus. Neck: Normal range of motion. Neck supple. No JVD present. No tracheal deviation present. Cardiovascular: Normal rate, regular rhythm and intact distal pulses. Pulmonary/Chest: Effort normal and breath sounds normal. No stridor. No respiratory distress. She has no wheezes. She exhibits no tenderness. Abdominal: Soft. Bowel sounds are normal. She exhibits no distension and no mass. There is no tenderness. Musculoskeletal: Normal range of motion. She exhibits no edema or tenderness.   
Neurological: She is alert and oriented to person, place, and time. No cranial nerve deficit. Skin: Skin is warm and dry. No rash noted. She is not diaphoretic. No erythema. No pallor. Psychiatric: She has a normal mood and affect. Her behavior is normal. Thought content normal.  
Nursing note and vitals reviewed. MDM Number of Diagnoses or Management Options Chronic bronchitis, unspecified chronic bronchitis type ([facility] [address]): established and worsening Chronic pain due to neoplasm: established and worsening COPD exacerbation ([facility] [address]): established and worsening Non-small cell cancer of right lung [facility]): established and worsening Diagnosis management comments: Patient's symptoms resolved prior to arriving in the ED. She wanted to remain on the high flow oxygen however her blood gas shows CO2 retention and did have Phyliss be appropriate for her. Patient refuses to be admitted and wants to be discharged. ED Course Procedures

## 2018-07-30 NOTE — ED TRIAGE NOTES
Patient has lung cancer and COPD. Patient currently on hospice for these conditions. Patient SOB. Two albuterol treatments at home before EMS arrived. Non rebreather at 15L with EMS. Patient had a lot of anxiety when EMS picked her up. Patient on morphine with hospice but out of medication. . EKG clear. Alert and oriented.

## 2018-07-30 NOTE — ED NOTES
I have reviewed discharge instructions with the patient. The patient verbalized understanding. Patient left ED via Discharge Method: wheelchair to Home with (insert name of family/friend, self, transport family). Opportunity for questions and clarification provided. Patient given 1 scripts. To continue your aftercare when you leave the hospital, you may receive an automated call from our care team to check in on how you are doing. This is a free service and part of our promise to provide the best care and service to meet your aftercare needs.  If you have questions, or wish to unsubscribe from this service please call [phone]. Thank you for Choosing our [facility] Emergency Department.

## 2018-07-30 NOTE — DISCHARGE INSTRUCTIONS
Learning About Saving Energy When You Have a Chronic Condition  Introduction    Everyday tasks can be tiring when you have COPD, heart failure, or another long-term (chronic) condition. You may feel at times that you've lost your ability to live your life. But learning to conserve, or save, your energy can help you be less tired. Conserving your energy means finding ways of doing daily activities with as little effort as possible. With some small changes in the way you do things, you can get your tasks done more easily. Some treatments are available that might help. Pulmonary rehabilitation can teach you ways to breathe easier. Cardiac rehabilitation can help make your heart stronger. You also may want to see an occupational or physical therapist. The therapist can give you more tips on building strength and moving with less effort. What can you do to conserve your energy? Planning  · Make a list of what you have to do every day. Group the tasks by location. · Do all the chores in one part of your house around the same time. · Go out for errands or do chores at the time of day when you have the most energy. · Plan rest periods into your day. Getting things done  · Sit down as often as you can when you get dressed, do chores, or cook. · Use a cart with wheels to roll items, such as laundry, from one room to another. · Push or slide boxes or other large items instead of lifting them. Reaching and bending  · Put things you use the most on shelves that are at the level of your waist or shoulder. · Use long-handled grabbers or other tools to reach items on a high shelf or to  things off the floor. Use long-handled dusters when you clean the house. · Use a raised toilet seat to avoid bending too far to sit or stand up. Eating  · Eat several small meals instead of three larger meals. · If you get too tired to eat much, try to choose healthy foods that have more calories.  Have a yogurt-and-fruit smoothie for breakfast. Put avocado on a sandwich. Or add cheese or peanut butter to snacks. · If you don't feel very hungry, try to eat first and drink water or other fluids later, after a meal. This can help keep you from losing weight. Sip small amounts of fluids if you need to drink while you eat. Having sex  · Choose the time of day when you have more energy. · A [id number] position for sex can be less tiring. Sometimes you may want to focus more on caressing. Watch closely for changes in your health, and be sure to contact your doctor if you have any problems. Where can you learn more? Go to http://jose-katarzyna.info/. Enter H190 in the search box to learn more about \"Learning About Saving Energy When You Have a Chronic Condition. \"  Current as of: December 6, 2017  Content Version: 11.7  © 9695-0494 [employer]. Care instructions adapted under license by [employer] (which disclaims liability or warranty for this information). If you have questions about a medical condition or this instruction, always ask your healthcare professional. [place] [address] any warranty or liability for your use of this information.

## 2018-08-06 PROBLEM — C34.90 LUNG CANCER (HCC): Status: ACTIVE | Noted: 2018-01-01

## 2018-08-06 NOTE — HSPC IDG CHAPLAIN NOTES
Patient: Aftab Stamp    Date: 08/06/18  Time: 4:38 PM    \[facility] [place] [facility]\""  Notes  Assessments pending for spiritual and bereavement care.          Signed by: Colton Paul

## 2018-08-06 NOTE — HSPC IDG VOLUNTEER NOTES
[address] Review     Status Codes I = Initiated C=Continued R=Revised RS = Resolved     I.  Volunteer     Goal: Hospice house volunteer (s) enhances the quality of remaining life while patient is at the hospice house. Interventions: Sadia Hackett Volunteer (s) will provide companionship to the patient and/or family by visiting at the hospice house       . Sadia Hackett Volunteer (s) will provide respite as needed when requested by patient and/or family. Sadia Borges  Volunteer will provide activities such as music, reading, pet therapy, etc. as requested. Sadia Borges  Comfort bag delivered. Any other special requests or information regarding volunteer services:    Comfort bag delivered. No further needs identified at this time. These notes have been discussed in [facility] meeting.         Signed by: Oneyda Velasquez

## 2018-08-06 NOTE — PROGRESS NOTES
[place] [facility] [facility] [place] with lung cancer with mets for management of pain, agitation and dyspnea. Pt is moaning with RR 28 and labored. Settled in room. [address] to right chest accessed. Morphine 2 mg IV given for dyspnea. Daughter and  in room with pt. Orientation to hospice house given. 1412 Pt resting and appears comfortable at this time.

## 2018-08-06 NOTE — HSPC IDG CHAPLAIN NOTES
Patient: Lyn Loza    Date: 08/06/18  Time: 4:35 PM    hospitals  Notes  Assessment pending for spiritual and bereavement care.          Signed by: Chrissy Morfin

## 2018-08-06 NOTE — H&P
History and Physical    Patient: Kyler Prado MRN: [id number]  SSN: [id number]    YOB: 1945  Age: 81 y.o. Sex: female      Subjective:      Kyler Prado is a 81 y.o. female who is known to us from our in home program where she has been managed for a hospice dx of metastatic lung cancer to liver. Since last Wed (August 1st), she has ceased to eat and has only been taking in small sips of fluid. She has also been much weaker and has become bed bound. Her family including her  and and her dtr are at bedside today and report that she has also had increased difficulty swallowing for the last few days and today is unavailable to tolerate her oral Roxanol solution and since yesterday unable to take her scheduled meds crushed. As such, she has been in a state of respiratory distress with very little improvement despite multiple attempts and doses provided by family and then RN. [facility] for hospice diagnosis of lung cancer with metastasis to liver for management of pain, dyspnea, agitation, and family/pt support.      Past Medical History:   Diagnosis Date    Acquired hypothyroidism 12/30/2016    Cancer ([facility]     non-hodgkins lymphoma, lung ca     Cancer of lung ([facility]     liver mets    Chronic pain     COPD     Hypercholesterolemia     Other ill-defined conditions(799.89)      hypothyroid    Sepsis ([facility] 5/16/2018    Symptomatic cholelithiasis 12/30/2016    Unspecified sleep apnea     uses o2 at night     Past Surgical History:   Procedure Laterality Date    HX OTHER SURGICAL      removed mass from right elbow    HX VASCULAR ACCESS        Family History   Problem Relation Age of Onset    Other Mother      lupus, breast ca    Other Father      DM    Other Maternal Aunt      ovarian ca     Social History   Substance Use Topics    Smoking status: Former Smoker     Packs/day: 1.00     Years: 48.00     Types: Cigarettes Quit date: 8/21/2015    Smokeless tobacco: Never Used    Alcohol use No      Prior to Admission medications    Medication Sig Start Date End Date Taking? Authorizing Provider   LORazepam (INTENSOL) 2 mg/mL concentrated solution Take 1 mg by mouth every two (2) hours as needed for Agitation or Anxiety. Indications: anxiety 8/1/18   Historical Provider   morphine (ROXANOL) 100 mg/5 mL (20 mg/mL) concentrated solution Take 10 mg by mouth every four (4) hours as needed for Pain. Indications: Chronic Pain with Opioid Tolerance 7/30/18   Historical Provider   acetaminophen (TYLENOL) 500 mg tablet Take 500 mg by mouth every six (6) hours as needed for Pain. takes for achiness about 2x/day    Indications: BACK PAIN, Pain 7/27/18   Historical Provider   IMPLANTED PORTS FLUSH PANEL 5 mL by Hemodialysis Port Injection route every month. Indications: port flush 7/6/18   Historical Provider   IMPLANTED PORTS FLUSH PANEL 10 mL by Hemodialysis Port Injection route every month. Indications: Port flush 7/6/18   Historical Provider   predniSONE (DELTASONE) 10 mg tablet Take 20 mg by mouth daily. Daily in AM     Resume after taper   Indications: Obstructive Pulmonary Disease 8/5/18   Historical Provider   albuterol (VENTOLIN HFA) 90 mcg/actuation inhaler Take 2 Puffs by inhalation every four (4) hours as needed for Shortness of Breath. Indications: Chronic Obstructive Pulmonary Disease 6/23/18   Historical Provider   albuterol-ipratropium (DUO-NEB) 2.5 mg-0.5 mg/3 ml nebu 3 mL by Nebulization route every two (2) hours as needed (wheezing, shortness of breath). Indications: Chronic Obstructive Pulmonary Disease with Bronchospasms 6/21/18   Historical Provider   zolpidem (AMBIEN) 10 mg tablet Take 10 mg by mouth nightly as needed for Sleep. 6/21/18   Historical Provider   citalopram (CELEXA) 40 mg tablet Take 40 mg by mouth daily.  6/21/18   Historical Provider   HYDROcodone-acetaminophen (NORCO)  mg tablet Take 1 Tab by mouth every six (6) hours as needed for Pain. 6/21/18   Historical Provider   LORazepam (ATIVAN) 1 mg tablet Take 1 mg by mouth every three (3) hours as needed for Anxiety. 6/21/18   Historical Provider   Omeprazole delayed release (PRILOSEC D/R) 20 mg tablet Take 20 mg by mouth two (2) times daily as needed (reflux). 6/21/18   Historical Provider   OXYGEN-AIR DELIVERY SYSTEMS 4 L by Nasal route continuous. 6/21/18   Historical Provider   predniSONE (DELTASONE) 10 mg tablet Take 10 mg by mouth Daily (before dinner). Resume after taper  Indications: Obstructive Pulmonary Disease 8/5/18   Historical Provider   levothyroxine (SYNTHROID) 88 mcg tablet Take 88 mcg by mouth Daily (before breakfast). 6/21/18   Historical Provider   prochlorperazine (COMPAZINE) 10 mg tablet Take 10 mg by mouth every six (6) hours as needed (nausea, vomiting). Indications: Nausea and Vomiting 6/21/18   Historical Provider   senna (SENNA) 8.6 mg tablet Take 2 Tabs by mouth two (2) times a day. Indications: constipation 8/1/18   Historical Provider        Allergies   Allergen Reactions    Iodinated Contrast- Oral And Iv Dye Nausea and Vomiting    Sulfa (Sulfonamide Antibiotics) Other (comments)     Skin burning, nausea vomiting    Zofran [Ondansetron Hcl (Pf)] Nausea and Vomiting       Review of Systems:  Pertinent items are noted in the History of Present Illness. Objective:     Vitals:    08/06/18 1331   BP: 117/73   Pulse: (!) 105   Resp: 28   Temp: 99.9 °F (37.7 °C)        Physical Exam:  GENERAL: fatigued, cooperative, mild distress, toxic  LUNG: coarse rales bilaterally but difficult to hear air entry to al lung fields due to patient distress and moaning/gutteral mouth noises. Utilizing accessory muscles and still with poor effort. RR 32 on arrival. Oxygen in place at 4 L/min via NC.   HEART: irregularly irregular rhythm  ABDOMEN: soft, non-tender.  Bowel sounds normal. No masses,  no organomegaly  EXTREMITIES:  extremities normal, atraumatic, no cyanosis or edema  SKIN: Normal. and no rash or abnormalities but grayish in appearance. Chest port to right side now accessed for med admin. NEUROLOGIC: Poorly reponsive. Opens eyes briefly but closes again and does not answer questions when asked or participate in exam. Bed bound. Generally weak. PSYCHIATRIC: anxious, agitated    Assessment:     Hospital Problems  Date Reviewed: 8/6/2018          Codes Class Noted POA    Lung cancer ([facility] ICD-10-CM: C34.90  ICD-9-CM: 162.9  8/6/2018 Unknown        Chronic respiratory failure with hypoxia (HCC) (Chronic) ICD-10-CM: J96.11  ICD-9-CM: 518.83, 799.02  5/16/2018 Yes        Anxiety (Chronic) ICD-10-CM: F41.9  ICD-9-CM: 300.00  5/16/2018 Yes        Chronic pain (Chronic) ICD-10-CM: V64.77  ICD-9-CM: 338.29  5/16/2018 Yes        * (Principal)Non-small cell lung cancer (HCC) (Chronic) ICD-10-CM: C34.90  ICD-9-CM: 162.9  5/15/2018 Yes        Chest pain ICD-10-CM: R07.9  ICD-9-CM: 786.50  12/30/2016 Yes        Palpitation ICD-10-CM: R00.2  ICD-9-CM: 785.1  12/30/2016 Yes              Plan:     Current Facility-Administered Medications   Medication Dose Route Frequency    morphine injection 2 mg  2 mg IntraVENous Q20MIN PRN    sodium chloride (NS) flush 10 mL  10 mL InterCATHeter Q12H    heparin (porcine) pf 300 Units  300 Units InterCATHeter Q12H    sodium chloride (NS) flush 10 mL  10 mL InterCATHeter PRN    heparin (porcine) pf 300 Units  300 Units InterCATHeter PRN    acetaminophen (TYLENOL) suppository 650 mg  650 mg Rectal Q3H PRN    LORazepam (ATIVAN) injection 1 mg  1 mg IntraVENous Q4H PRN    bisacodyl (DULCOLAX) suppository 10 mg  10 mg Rectal PRN    haloperidol lactate (HALDOL) injection 2 mg  2 mg SubCUTAneous Q1H PRN    Or    haloperidol lactate (HALDOL) injection 2 mg  2 mg IntraVENous Q1H PRN    glycopyrrolate (ROBINUL) injection 0.2 mg  0.2 mg IntraVENous Q4H PRN     1.  [facility] for GIP level of care for hospice diagnosis of lung cancer with metastasis to liver for management of pain, dyspnea, agitation, and family/pt support. 2. Pain: PRN 2 mg morphine IV via port. 3. Dyspnea: morphine as indicated above. Oxygen PRN. Duonebs PRN. Glycopyrrolate PRN. 4. Agitation: PRN haldol or ativan. 5. Family/pt support: Spoke with family away from bedside regarding patient clinical appearance and her likely prognosis. We discuss plan of care including meds and the likely futility of dexamethasone at this time given her overall decline and apathy towards food/fluids. They express concern about her \"starving to death\" and I review with them that this is not the case and provide listening support as they work through their thoughts on this. We also agree that a pleasure diet will be available if she were to improve at all and have the ability to consume said diet. A handout will be provided to them that addresses this on a more in depth basis. Additionally, we discuss the development of a fever and how that may be demonstrating her transition towards her demise particularly since she has been taking steroids on a regular basis. Overall,  and dtr agree that she has suffered for a long time and that they desire only for her comfort. I have left the decision about steroid continuation with them to discuss but make it clear that the outcome will not be any different and that we may not be able to delay the inevitable and they acknowledge understanding of same. We will continue to monitor her and palliate her symptoms as they arise. PPS 20%.       Signed By: Jessie Dow NP     August 6, 2018

## 2018-08-07 NOTE — PROGRESS NOTES
Report received. Pt round. Pt identified by name. In bed with eyes closed. No s/s of pain, agitation or dyspnea. Resp are regular and unlabored. Oxygen on via nc at 4l/min. Bed low and SR up with tab alerts on. Family at bedside. 1057 Pt is restless and moaning loudly. Morphine 2 mg IV for pain and Ativan 1 mg IV for agitation. 1143 Morphine 2 mg IV for pain. Pt continuing to moan during AM care. Pt has saturated brief at this time. 1237 [address] cath inserted for pt comfort. approx 200 cc dark yellow urine obtained. 1342 Pt resting and appears comfortable at this time. RR regular and unlabored. No s/s of agitation. FLACC 0  1715 Scheduled Ativan and Morphine given. Pt beginning to moan at time of administration. Family at bedside.

## 2018-08-07 NOTE — PROGRESS NOTES
SIGRID visited the pt in her room. She was asleep with a work friend with her. She said her family will be in later today to visit. SIGRID left my card and told her I would be in later when they arrived to complete my assessment.

## 2018-08-07 NOTE — PROGRESS NOTES
Bedside Report taken from Maximino Essex, [place]. [place] Pt identified using name and . Pt in bed with eyes closed; displaying no signs or symptoms of pain, dyspnea, agitation,nausea, or vomiting. FLACC 0. Bed locked and low, side rails up, tabs/bed alarm in place for pt. safety. Call light with in reach, and door opened for continued monitoring.

## 2018-08-07 NOTE — PROGRESS NOTES
Problem: Pressure Injury - Risk of  Goal: *Prevention of pressure injury  Document Kadeem Scale and appropriate interventions in the flowsheet.    Outcome: Progressing Towards Goal  Pressure Injury Interventions:  Sensory Interventions: Assess changes in LOC, Check visual cues for pain, Float heels, Keep linens dry and wrinkle-free, Minimize linen layers, Pad between skin to skin    Moisture Interventions: Absorbent underpads, Apply protective barrier, creams and emollients, Check for incontinence Q2 hours and as needed, Maintain skin hydration (lotion/cream), Minimize layers, Moisture barrier    Activity Interventions: Pressure redistribution bed/mattress(bed type)    Mobility Interventions: Float heels, HOB 30 degrees or less, Pressure redistribution bed/mattress (bed type)    Nutrition Interventions:  (Pt not taking PO)    Friction and Shear Interventions: Apply protective barrier, creams and emollients, Minimize layers, Lift sheet

## 2018-08-07 NOTE — PROGRESS NOTES
08/07/18 1525   Pyschosocial Assessment 1   Concerns from previous vist? No  (No Initial [facility] assessement)   Significant changes in relationships or household members? No   Signs of abuse or neglect? No   Financial Need (none noted at this time)   Pain signs needing to be reported to  No   Psychosocial Components/Teaching/Interventions Instructed on how to contact the agency with concerns; Emotional support/supportive listening   The patient has designated their health care surrogate Patient has not made wishes known  (Spouse is Liam GRAVES' )   Visit Environment: LMSW visited with the daughter in the family room to complete the  initial [facility] assessment      Circumstances for Admission: Mariangel Medina is a patient from the in home program.  Her CM is Clinton Diamond and Shaun Morales is . Pt has a diagnosis of Lung Ca. Pt was having trouble having her symptoms managed in the home, so she was admitted to [place] [address] under [facility] level of care. Family/Social History: Pt is a 81year old  female. She has been  to her spouse Angela Riggs for 43 years. Both of them have cancer. Angela Riggs has bladder cancer and renal failure. He is not here today because he has run himself down trying to care for her at home. Mariangel Medina has 2 children: Leopoldo Staley and her brother Katarina Santos. Leopoldo Staley is  and has a daughter who is 25. Katarina Santos lives at home with his parents. Pt was very concerned about how her family would function with out her. So she has made arrangements so that her son is to get the home he lives in. 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 Spritiual Assessment: Pt is a member of [address] [place] [address]. Per daughter she has not been able to attend in [facility]. Living Arrangements: Pt lived in her own home with her son and spouse.         Patient's Emotional and Cognitive Status: Pt is currently obtunded and not responding        Primary Caregiver / People Involved: Harley- Spouse, Kanchan- Daughter, Georgi Magallanes -son, pt's mother has been here as well      Advance Directive/HCPOA / DNR Status: there is no HCPOA on file, Pt is a DNR       Final Arrangements: Daughter is unsure of the  home she will consult with her father and let me know. Harm to Self or Others: Pt is not a harm to herself or others       Bereavement Risk: Moderate. Due to the illness of the spouse as well as the son living in the home.         Plan for the Patient: Pt is nearing her demise      Referrals Made: none at this time

## 2018-08-07 NOTE — PROGRESS NOTES
Progress Note    Patient: Cherie Solorio MRN: [id number]  SSN: [id number]    YOB: 1945  Age: 81 y.o. Sex: female      Admit Date: 8/6/2018    LOS: 1 day     Subjective:   Patient is moaning aloud upon entering the room and is thrashing about in the bed, appearing very agitated. She is not redirectable and at present her best friend and other family member are at her bedside. Their efforts to verbally console her are ineffective and the RN has been called to bring medications for agitation. Per the family, patient is mumbling about needing to get up and go to the BR but she is unable to articulate that to me and she is in no way safe to get up out of bed. Loved ones at the bedside are clearly struggling at this time and information is not always clear or consistent. Sidebar conversations with Primary RN and CNA ensure so that a clearer picture can be obtained and tentative plan is to medicate and insert catheter for comfort purposes. She has not had any oral intake and she is no longer safe to do so. Plus, she has required several doses of parenteral medications to manage her severe dyspnea and worsening agitation. Review of Systems:  Review of systems not obtained due to patient factors. Objective:     Vitals:    08/06/18 1331 08/07/18 0439   BP: 117/73 119/70   Pulse: (!) 105 85   Resp: 28 16   Temp: 99.9 °F (37.7 °C) 97.4 °F (36.3 °C)        Intake and Output:  Current Shift:    Last three shifts:      Physical Exam:  GENERAL: fatigued, cooperative, mild distress, toxic  LUNG: coarse rales bilaterally but difficult to hear air entry to al lung fields due to patient distress and moaning/gutteral mouth noises. Utilizing accessory muscles and still with poor effort. RR 32 on arrival. Oxygen in place at 4 L/min via NC.   HEART: irregularly irregular rhythm  ABDOMEN: soft, non-tender.  Bowel sounds normal. No masses,  no organomegaly  EXTREMITIES:  extremities normal, atraumatic, no cyanosis or edema  SKIN: Normal. and no rash or abnormalities but grayish in appearance. Chest port to right side now accessed for med admin. NEUROLOGIC: Poorly reponsive. Opens eyes briefly but closes again and does not answer questions when asked or participate in exam. Bed bound. Generally weak. PSYCHIATRIC: anxious, agitated    Lab/Data Review:  No new labs resulted in the last 24 hours. Assessment:     Principal Problem:    Non-small cell lung cancer ([facility] (5/15/2018)    Active Problems:    Chest pain (12/30/2016)      Palpitation (12/30/2016)      Chronic respiratory failure with hypoxia (HCC) (5/16/2018)      Anxiety (5/16/2018)      Chronic pain (5/16/2018)      Lung cancer ([facility] (8/6/2018)        Plan:     Current Facility-Administered Medications   Medication Dose Route Frequency    morphine injection 2 mg  2 mg IntraVENous Q20MIN PRN    sodium chloride (NS) flush 10 mL  10 mL InterCATHeter Q12H    heparin (porcine) pf 300 Units  300 Units InterCATHeter Q12H    sodium chloride (NS) flush 10 mL  10 mL InterCATHeter PRN    heparin (porcine) pf 300 Units  300 Units InterCATHeter PRN    acetaminophen (TYLENOL) suppository 650 mg  650 mg Rectal Q3H PRN    LORazepam (ATIVAN) injection 1 mg  1 mg IntraVENous Q4H PRN    bisacodyl (DULCOLAX) suppository 10 mg  10 mg Rectal PRN    haloperidol lactate (HALDOL) injection 2 mg  2 mg SubCUTAneous Q1H PRN    Or    haloperidol lactate (HALDOL) injection 2 mg  2 mg IntraVENous Q1H PRN    glycopyrrolate (ROBINUL) injection 0.2 mg  0.2 mg IntraVENous Q4H PRN    albuterol-ipratropium (DUO-NEB) 2.5 MG-0.5 MG/3 ML  3 mL Nebulization Q4H PRN     1. [facility] for [facility] level of care for hospice diagnosis of lung cancer with metastasis to liver for management of pain, dyspnea, agitation, and family/pt support.      2. Pain: PRN 2 mg morphine IV via port. Will add scheduled morphine q 6 for patient comfort and to alleviate family anxiety.      3.  Dyspnea: morphine as indicated above. Oxygen PRN. Duonebs PRN. Glycopyrrolate PRN.      4. Agitation: PRN haldol or ativan. Will add scheduled ativan q 6 for patient comfort and to alleviate family anxiety.      5. Family/pt support: Spoke with family (son) away from bedside regarding patient clinical appearance and her likely prognosis. We discuss plan of care including meds and the likely futility of dexamethasone at this time given her overall decline and apathy towards food/fluids. This is essentially the same conversation that occurred yesterday with the patient's  and dtr. Today the scenario has not improved and the patient has become even more delirious and agitated. There has also been a low grade fever develop over night. As such, providing steroids at this time is even less appealing and this update has been shared with both son and dtr as today, patient's , is taking care of some of his own health issues. We will continue to monitor her and palliate her symptoms as they arise. PPS 20%.     Signed By: Blu Quinones NP     August 7, 2018

## 2018-08-07 NOTE — PROGRESS NOTES
Situation: Cherry Stock is a 81 y.o. female who is known to us from our in home program where she has been managed for a hospice dx of metastatic lung cancer to liver. She is currently GIP level of care for symptom management. Background: Patient is . She has one daughter and a son. She has one granddaughter. 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 She is a [employer] and adheres to the [employer]. Due to her health she has not been able to attend [employer] in quite some time. Assessment:  began by speaking with patient's son in law and his mom. Patient appeared to be sleeping soundly. Occasionally she would change her facial expressions. The family spoke of how Ms. Bradley Kevin enjoyed her job and found it rewarding to help people better themselves. They spoke of what a wonderful cook patient is and how she enjoyed cooking for her family. Patient's daughter Koko Cristobal came to the room. While talking with Koko Cristobal  learned that patient's  is not in good health. Koko Cristobal is caring for both of them. Koko Cristobal has brought her dad to visit mom but says he is limited as to how much he can visit. Koko Cristobal appears to be holding up in spite of all she has on her plate. She has a good support system.  assured her of continued support. Plan:   to provide spiritual and emotional support throughout patient's time with [facility].

## 2018-08-07 NOTE — PROGRESS NOTES
Report received from off going RN. Patient round. ID by name and . Patient resting with eyes closed. No s/sx of pain. No distress noted. RR even and unlabored. Friend at the bedside. Bed low and locked. Call bell in reach.

## 2018-08-07 NOTE — PROGRESS NOTES
Problem: Falls - Risk of  Goal: *Absence of Falls  Document Bessie Fall Risk and appropriate interventions in the flowsheet. Outcome: Progressing Towards Goal  Fall Risk Interventions:       Mentation Interventions: Adequate sleep, hydration, pain control, Bed/chair exit alarm, Door open when patient unattended, Family/sitter at bedside, Reorient patient    Medication Interventions: Bed/chair exit alarm    Elimination Interventions: Bed/chair exit alarm, Call light in reach             Problem: Pressure Injury - Risk of  Goal: *Prevention of pressure injury  Document Kadeem Scale and appropriate interventions in the flowsheet.    Outcome: Progressing Towards Goal  Pressure Injury Interventions:  Sensory Interventions: Assess changes in LOC, Check visual cues for pain, Float heels, Keep linens dry and wrinkle-free, Minimize linen layers, Pad between skin to skin    Moisture Interventions: Absorbent underpads, Apply protective barrier, creams and emollients, Check for incontinence Q2 hours and as needed, Maintain skin hydration (lotion/cream), Minimize layers, Moisture barrier    Activity Interventions: Pressure redistribution bed/mattress(bed type)    Mobility Interventions: Float heels, HOB 30 degrees or less, Pressure redistribution bed/mattress (bed type)    Nutrition Interventions:  (Pt not taking PO)    Friction and Shear Interventions: Apply protective barrier, creams and emollients, Minimize layers, Lift sheet

## 2018-08-08 NOTE — PROGRESS NOTES
Bedside Report taken from [place],  [facility]. Larisaindira Mar Pt identified using name and . Pt in bed with eyes closed; displaying no signs or symptoms of pain, dyspnea, agitation,nausea, or vomiting. FLACC 0. Bed locked and low, side rails up, tabs/bed alarm in place for pt. safety. Call light with in reach, and door opened for continued monitoring.

## 2018-08-08 NOTE — PROGRESS NOTES
Progress Note    Patient: Dorota Razo MRN: [id number]  SSN: [id number]    YOB: 1945  Age: 81 y.o. Sex: female      Admit Date: 8/6/2018    LOS: 2 days     Subjective:   Patient remains poorly responsive. No oral intake. Required multiple medications throughout the night to manage her anxiety, restlessness, and dyspnea via parenteral means. Restful at the moment but has been having periods of uncontrolled agitation and pain that is no able to be redirected. Multiple family/friends coming and going throughout the day. Review of Systems:  Review of systems not obtained due to patient factors. Objective:     Vitals:    08/06/18 1331 08/07/18 0439 08/07/18 1754 08/08/18 0434   BP: 117/73 119/70 132/70 143/74   Pulse: (!) 105 85 95 98   Resp: 28 16 18 20   Temp: 99.9 °F (37.7 °C) 97.4 °F (36.3 °C) 98.1 °F (36.7 °C) 97.9 °F (36.6 °C)        Intake and Output:  Current Shift:    Last three shifts: 08/06 1901 - 08/08 0700  In: -   Out: 350 [Urine:350]    Physical Exam:  GENERAL: fatigued, cooperative, mild distress, toxic  LUNG: coarse rales bilaterally. Utilizing accessory muscles and still with poor effort. RR 32. Oxygen in place at 4 L/min via NC.   HEART: irregularly irregular rhythm  ABDOMEN: soft, non-tender. Bowel sounds normal. No masses,  no organomegaly  EXTREMITIES:  extremities normal, atraumatic, no cyanosis or edema  SKIN: Normal. and no rash or abnormalities but grayish in appearance. Chest port to right side now accessed for med admin. NEUROLOGIC: Poorly reponsive. Opens eyes briefly but closes again and does not answer questions when asked or participate in exam. Bed bound. Generally weak. PSYCHIATRIC: anxious, agitated    Lab/Data Review:  No new labs resulted in the last 24 hours.     Assessment:     Principal Problem:    Non-small cell lung cancer ([facility] (5/15/2018)    Active Problems:    Chest pain (12/30/2016)      Palpitation (12/30/2016)      Chronic respiratory failure with hypoxia ([facility] (5/16/2018)      Anxiety (5/16/2018)      Chronic pain (5/16/2018)      Lung cancer ([facility] (8/6/2018)        Plan:     Current Facility-Administered Medications   Medication Dose Route Frequency    LORazepam (ATIVAN) injection 1 mg  1 mg IntraVENous Q3H    morphine injection 2 mg  2 mg IntraVENous Q3H    morphine injection 2 mg  2 mg IntraVENous Q20MIN PRN    sodium chloride (NS) flush 10 mL  10 mL InterCATHeter Q12H    heparin (porcine) pf 300 Units  300 Units InterCATHeter Q12H    sodium chloride (NS) flush 10 mL  10 mL InterCATHeter PRN    heparin (porcine) pf 300 Units  300 Units InterCATHeter PRN    acetaminophen (TYLENOL) suppository 650 mg  650 mg Rectal Q3H PRN    LORazepam (ATIVAN) injection 1 mg  1 mg IntraVENous Q4H PRN    bisacodyl (DULCOLAX) suppository 10 mg  10 mg Rectal PRN    haloperidol lactate (HALDOL) injection 2 mg  2 mg SubCUTAneous Q1H PRN    Or    haloperidol lactate (HALDOL) injection 2 mg  2 mg IntraVENous Q1H PRN    glycopyrrolate (ROBINUL) injection 0.2 mg  0.2 mg IntraVENous Q4H PRN    albuterol-ipratropium (DUO-NEB) 2.5 MG-0.5 MG/3 ML  3 mL Nebulization Q4H PRN     1. [facility] for [facility] level of care for hospice diagnosis of lung cancer with metastasis to liver for management of pain, dyspnea, agitation, and family/pt support.      2. Pain: PRN 2 mg morphine IV via port. Increase scheduled morphine to q 3 for patient comfort and to alleviate family anxiety.      3. Dyspnea: morphine as indicated above. Oxygen PRN. Duonebs PRN. Glycopyrrolate PRN.      4. Agitation: PRN haldol or ativan. increase scheduled ativan to q 3 for patient comfort and to alleviate family anxiety.      5. Family/pt support: Spoke with family (dtr) away from bedside regarding patient clinical appearance and her likely prognosis. Family is coming to terms with her impending demise and there are no further questions raised about steroid use or about her eating/drinking. It appears that they are more at peace today but continue to gently be reminded about stimulating the patient unnecessarily. Oral intake remains scant. Morphine and ativan increased as demands have increased. Today they have informed me of her  home. We will continue to monitor her and palliate her symptoms as they arise. PPS 10%.     Signed By: Martin High NP     2018

## 2018-08-08 NOTE — PROGRESS NOTES
Report received from off-going nurse, visual identification made, assumed care of pt. Pt resting quietly with eyes closed, no agitation or restlessness, no grimacing or groaning. Pt respirations unlabored. Tab alert in place, rails up x 2, bed in lowest position, safety maintained.  FLACC 0. accompanied

## 2018-08-09 NOTE — PROGRESS NOTES
Progress Note    Patient: Ananda Monteiro MRN: [id number]  SSN: [id number]    YOB: 1945  Age: 81 y.o. Sex: female      Admit Date: 8/6/2018    LOS: 3 days     Subjective:     Responds to stimuli with agitation. Dyspnea at rest. NPO x 8 days. Requiring all meds parenterally, see MAR. Family at bedside. Review of Systems:  Review of systems not obtained due to patient factors. Objective:     Vitals:    08/08/18 0434 08/08/18 1636 08/09/18 0404 08/09/18 1521   BP: 143/74 119/82 134/82 127/64   Pulse: 98 93 85 (!) 121   Resp: 20 16 16 20   Temp: 97.9 °F (36.6 °C) 96.7 °F (35.9 °C) 96.2 °F (35.7 °C) 97.6 °F (36.4 °C)        Intake and Output:  Current Shift: 08/09 0701 - 08/09 1900  In: -   Out: 200 [Urine:200]  Last three shifts: 08/07 1901 - 08/09 0700  In: -   Out: 550 [Urine:550]    Physical Exam:   GENERAL: mild distress, appears older than stated age, toxic, responds to stimuli with agitation. Icteric and dusky. LUNG: Coarse breath sounds with rhonchi. Labored respirations with use of accessory muscles. HEART: irregularly irregular rhythm  ABDOMEN: soft, non-tender. Bowel sounds hypoactive. : Plascencia catheter placed on 8/7. Dark shar urine present. EXTREMITIES:  extremities with no edema. Weak pulses. SKIN: no rash or abnormalities except for dusky and jaundiced skin color. Cool to touch. Mottling noted in toes. NEUROLOGIC: Responds to stimuli with agitation and moaning. Unable to participate in exam. Bedbound. Lab/Data Review:  No new labs resulted in the last 24 hours.     Assessment:     Principal Problem:    Non-small cell lung cancer ([facility] (5/15/2018)    Active Problems:    Chest pain (12/30/2016)      Palpitation (12/30/2016)      Chronic respiratory failure with hypoxia (HCC) (5/16/2018)      Anxiety (5/16/2018)      Chronic pain (5/16/2018)      L[facility] ([facility] (8/6/2018)        Plan:     Current Facility-Administered Medications   Medication Dose Route Frequency    LORazepam (ATIVAN) injection 2 mg  2 mg IntraVENous Q3H    LORazepam (ATIVAN) injection 2 mg  2 mg IntraVENous Q4H PRN    morphine injection 2 mg  2 mg IntraVENous Q3H    morphine injection 2 mg  2 mg IntraVENous Q20MIN PRN    sodium chloride (NS) flush 10 mL  10 mL InterCATHeter Q12H    heparin (porcine) pf 300 Units  300 Units InterCATHeter Q12H    sodium chloride (NS) flush 10 mL  10 mL InterCATHeter PRN    heparin (porcine) pf 300 Units  300 Units InterCATHeter PRN    acetaminophen (TYLENOL) suppository 650 mg  650 mg Rectal Q3H PRN    bisacodyl (DULCOLAX) suppository 10 mg  10 mg Rectal PRN    haloperidol lactate (HALDOL) injection 2 mg  2 mg SubCUTAneous Q1H PRN    Or    haloperidol lactate (HALDOL) injection 2 mg  2 mg IntraVENous Q1H PRN    glycopyrrolate (ROBINUL) injection 0.2 mg  0.2 mg IntraVENous Q4H PRN    albuterol-ipratropium (DUO-NEB) 2.5 MG-0.5 MG/3 ML  3 mL Nebulization Q4H PRN       Admitted GIP with lung cancer with liver metastasis for management of pain, dyspnea and agitation. 1. Pain: Morphine as ordered. 2. Dyspnea: Morphine as ordered. Nebulizers prn. Glycopyrrolate prn secretions. Oxygen prn.    3. Agitation: Haloperidol and Lorazepam as ordered. 4. Family/Pt Support: Family at bedside during exam. Medications and plan of care discussed with nursing staff and family. Will continue to monitor for symptoms and adjust medications as needed to maintain patient comfort. PPS 10%. Case discussed with Dr. Julee Hayes. Increased Ativan to 2mg dose today scheduled and prn.      Signed By: Chichi Lee NP     August 9, 2018

## 2018-08-09 NOTE — PROGRESS NOTES
Report received from off-going nurse, visual identification made, assumed care of pt. Pt resting quietly with eyes closed, no agitation or restlessness, no grimacing or groaning. Pt respirations unlabored. Tab alert in place, rails up x 2, bed in lowest position, safety maintained. FLACC 0. Accompanied  0829 administered scheduled ativan and morphine slow iv push, physical assessment completed. Pt moaning groaning, restless, dyspneic. 1114 pt friend at bedside states pt has not settled more than 15 minutes, order change per NP. Pt moaning, groaning, restless, dyspneic, using accessory muscles, administered 2 mg ativan slow iv push and 2 mg morphine slow iv push. Pt has began to quiet, but is still dyspneic  1300 pt still dyspneic, she is more grey colored and she has mottling that has increased from bottom of her feet to top of her feet and ankles.    1500 pt resting quietly, not as dyspneic as earlier, friend at bedside

## 2018-08-09 NOTE — PROGRESS NOTES
Problem: Falls - Risk of  Goal: *Absence of Falls  Document Bessie Fall Risk and appropriate interventions in the flowsheet. Outcome: Progressing Towards Goal  Fall Risk Interventions:       Mentation Interventions: Bed/chair exit alarm, Door open when patient unattended    Medication Interventions: Bed/chair exit alarm    Elimination Interventions: Bed/chair exit alarm             Problem: Pressure Injury - Risk of  Goal: *Prevention of pressure injury  Document Kadeem Scale and appropriate interventions in the flowsheet.    Outcome: Progressing Towards Goal  Pressure Injury Interventions:  Sensory Interventions: Assess changes in LOC, Float heels    Moisture Interventions: Absorbent underpads, Apply protective barrier, creams and emollients    Activity Interventions: Assess need for specialty bed    Mobility Interventions: Float heels    Nutrition Interventions:  (pt not arousable enough to take in oral nutrition )    Friction and Shear Interventions: Apply protective barrier, creams and emollients, Lift sheet

## 2018-08-09 NOTE — PROGRESS NOTES
Bedside Report taken from [facility]. Jean Neil Pt identified using name and . Pt in bed with eyes closed; displaying no signs or symptoms of pain, dyspnea, agitation,nausea, or vomiting. FLACC 0. Bed locked and low, side rails up, tabs/bed alarm in place for pt. safety. Call light with in reach, and pt.s friend Wally Velazquez at the bedside.  Will continue to monitor

## 2018-08-10 NOTE — [facility] IDG VOLUNTEER NOTES
[address] Review     Status Codes I = Initiated C=Continued R=Revised RS = Resolved     I.  Volunteer     Goal: Hospice house volunteer (s) enhances the quality of remaining life while patient is at the hospice house. Interventions: Rosi Vanegas Proffer Volunteer (s) will provide companionship to the patient and/or family by visiting at the hospice house       . Rosi Vanegas Proffer Volunteer (s) will provide respite as needed when requested by patient and/or family. Rosi Hopkins  Volunteer will provide activities such as music, reading, pet therapy, etc. as requested. Rosi Hopkins  Comfort bag delivered. Any other special requests or information regarding volunteer services:    Two visits for companionship are recorded. No further needs identified at this time. These notes have been discussed in [facility] meeting.         Signed by: Deann Stevenson

## 2018-08-10 NOTE — PROGRESS NOTES
Problem: Falls - Risk of  Goal: *Absence of Falls  Document Bessie Fall Risk and appropriate interventions in the flowsheet. Outcome: Progressing Towards Goal  Fall Risk Interventions:       Mentation Interventions: Bed/chair exit alarm, Door open when patient unattended    Medication Interventions: Bed/chair exit alarm    Elimination Interventions: Bed/chair exit alarm             Problem: Pressure Injury - Risk of  Goal: *Prevention of pressure injury  Document Kadeem Scale and appropriate interventions in the flowsheet.    Outcome: Progressing Towards Goal  Pressure Injury Interventions:  Sensory Interventions: Assess changes in LOC, Float heels    Moisture Interventions: Absorbent underpads    Activity Interventions: Assess need for specialty bed    Mobility Interventions: Float heels    Nutrition Interventions:  (pt unable to take in oral nutrition)    Friction and Shear Interventions: Apply protective barrier, creams and emollients, Lift sheet

## 2018-08-10 NOTE — PROGRESS NOTES
Report received from off-going nurse, visual identification made, assumed care of pt. Pt resting quietly with eyes closed, no agitation or restlessness, no grimacing or groaning. Pt respirations unlabored. Tab alert in place, rails up x 2, bed in lowest position, safety maintained. FLACC 0.  Accompanied  0925 physical assessment completed, pt non responsive  1130 even when turned and repositioned pt not responsive, groaned a couple of time

## 2018-08-10 NOTE — PROGRESS NOTES
Progress Note    Patient: Amarjit Gamble MRN: [id number]  SSN: [id number]    YOB: 1945  Age: 81 y.o. Sex: female      Admit Date: 8/6/2018    LOS: 4 days     Subjective:     Unresponsive. Dyspnea at rest. NPO x 9 days. Has required morphine x 3 IV pain/dyspnea and glycopyrrolate x 2 for secretions. Family at bedside. Review of Systems:  Review of systems not obtained due to patient factors. Objective:     Vitals:    08/08/18 1636 08/09/18 0404 08/09/18 1521 08/10/18 0435   BP: 119/82 134/82 127/64 113/78   Pulse: 93 85 (!) 121 (!) 113   Resp: 16 16 20 20   Temp: 96.7 °F (35.9 °C) 96.2 °F (35.7 °C) 97.6 °F (36.4 °C) 97.6 °F (36.4 °C)        Intake and Output:  Current Shift:    Last three shifts: 08/08 1901 - 08/10 0700  In: -   Out: 225 [Urine:225]    Physical Exam:   GENERAL: mild distress, appears older than stated age, toxic, unresponsive. Icteric. LUNG: Coarse breath sounds with rhonchi. Labored and shallowed respirations  HEART: irregularly irregular rhythm  ABDOMEN: soft, non-tender. Bowel sounds hypoactive. : Plascencia catheter placed on 8/7. Dark shar urine   EXTREMITIES:  extremities with no edema. Weak pulses. SKIN: no rash or abnormalities except for pale and jaundiced skin color. Cool to touch. Mottling noted in toes. NEUROLOGIC: Unresponsive. Unable to participate in exam. Bedbound. Lab/Data Review:  No new labs resulted in the last 24 hours.     Assessment:     Principal Problem:    Non-small cell lung cancer ([facility] [address] (5/15/2018)    Active Problems:    Chest pain (12/30/2016)      Palpitation (12/30/2016)      Chronic respiratory failure with hypoxia (HCC) (5/16/2018)      Anxiety (5/16/2018)      Chronic pain (5/16/2018)      Lung cancer ([facility] [address] (8/6/2018)        Plan:     Current Facility-Administered Medications   Medication Dose Route Frequency    LORazepam (ATIVAN) injection 2 mg  2 mg IntraVENous Q3H    LORazepam (ATIVAN) injection 2 mg  2 mg IntraVENous Q4H PRN    morphine injection 2 mg  2 mg IntraVENous Q3H    morphine injection 2 mg  2 mg IntraVENous Q20MIN PRN    sodium chloride (NS) flush 10 mL  10 mL InterCATHeter Q12H    heparin (porcine) pf 300 Units  300 Units InterCATHeter Q12H    sodium chloride (NS) flush 10 mL  10 mL InterCATHeter PRN    heparin (porcine) pf 300 Units  300 Units InterCATHeter PRN    acetaminophen (TYLENOL) suppository 650 mg  650 mg Rectal Q3H PRN    bisacodyl (DULCOLAX) suppository 10 mg  10 mg Rectal PRN    haloperidol lactate (HALDOL) injection 2 mg  2 mg SubCUTAneous Q1H PRN    Or    haloperidol lactate (HALDOL) injection 2 mg  2 mg IntraVENous Q1H PRN    glycopyrrolate (ROBINUL) injection 0.2 mg  0.2 mg IntraVENous Q4H PRN    albuterol-ipratropium (DUO-NEB) 2.5 MG-0.5 MG/3 ML  3 mL Nebulization Q4H PRN       Admitted GIP with lung cancer with liver metastasis for management of pain, dyspnea and agitation. 1. Pain: Morphine as ordered. 2. Dyspnea: Morphine as ordered. Nebulizers prn. Glycopyrrolate prn secretions. Oxygen prn.    3. Agitation: Haloperidol and Lorazepam as ordered. 4. Family/Pt Support: Family at bedside during exam. Medications and plan of care discussed with nursing staff and family. Will continue to monitor for symptoms and adjust medications as needed to maintain patient comfort. PPS 10%. Case discussed with Dr. Julee Hayes and in [facility] meeting today. No changes in plan of care. Comprehensive plan of care reviewed. POC from home has been reviewed and modified as necessary to meet patients needs. IDG and pt./family in agreement with plan of care. The IDG identifies through on-going assessment when a change is needed to the POC; the pt/family will receive care and services necessitated by changes in POC. Medications reviewed by the pharmacist and Medical Director.     Signed By: Chichi Lee NP     August 10, 2018

## 2018-08-10 NOTE — [facility] IDG SOCIAL WORKER NOTES
Patient: Thelma Tompkins    Date: 08/10/18  Time: 10:02 AM    \[facility]\""  Notes    LMSW will continue to provide support to pt and family. Family is supportive of her wishes to be private during this time. Family has a sign in the door refusing visitors. No D/C plans due to pt factors.         Signed by: Gonzales Renteria

## 2018-08-10 NOTE — [facility] IDG CHAPLAIN NOTES
Patient: Deirdre Can    Date: 08/10/18  Time: 1:32 PM    [facility]  Notes  Intervention: Spiritual assessment, family support, prayer. Outcome; Family had an opportunity to discuss patient's condition and also patient's 's illness. Plan:  will continue to provide spiritual and emotional support throughout patient's time with [facility].          Signed by: Gayathri Haynes

## 2018-08-11 NOTE — PROGRESS NOTES
[id number] Pt lying in bed with eyes closed, facial expression relaxed, no grimacing noted, resps even and shallow at 22. Friend asleep at bedside. Safety measures in place. [id number]  Pt stirring slightly in bed, scheduled meds given and assessment performed, pt settles quickly with repositioning. Small amount of secretions cleared with repositioning. Pt appears comfortable and calm. Teaching on dying process with friend, and questions encouraged. Understanding of teaching verbalized. Active listening and emotional support provided. Friend denies needs. Safety measures in place. [id number] Called to room by MIRA Lake, who is finishing pt's bedbath. Suctioning provided by this nurse, due to froth in mouth and nose. Tolerated well. No grimacing or negative vocalization noted. Safety measures in place. [id number]  This nurse called to bedside by MIRA Lake. Pt actively dying. Support offered to friend at bedside. Absence of apical pulse and respirations x 3 minutes, and TOD verified by this nurse at 1122. Pt passed very peacefully with friend and hospice staff at bedside. Additional support services declined by friend. Spouse notified. Offered to call children, but spouse declined, stating they were en route to his house, and he would notify them.  reports that he and his children said goodbyes last night, and will not be returning to [facility]. Spouse requests [facility] be contacted for pickup. Post mortem care provided, and pt received by [facility].

## 2018-08-11 NOTE — PROGRESS NOTES
Walking rounds completed with off going RN. Pt identified by name/. Pt non responsive. Medicated for dyspnea,see MAR. HOB elevated. Family friend at bedside. Bed in low and locked position with side rails up x2 and call light in reach.

## 2018-08-12 PROCEDURE — 0656 HSPC GENERAL INPATIENT

## 2018-09-07 PROCEDURE — A4649 SURGICAL SUPPLIES: HCPCS

## 2024-10-11 NOTE — DISCHARGE INSTRUCTIONS
Chronic Obstructive Pulmonary Disease (COPD): Care Instructions  Your Care Instructions    Chronic obstructive pulmonary disease (COPD) is a general term for a group of lung diseases, including emphysema and chronic bronchitis. People with COPD have decreased airflow in and out of the lungs, which makes it hard to breathe. The airways also can get clogged with thick mucus. Cigarette smoking is a major cause of COPD. Although there is no cure for COPD, you can slow its progress. Following your treatment plan and taking care of yourself can help you feel better and live longer. Follow-up care is a key part of your treatment and safety. Be sure to make and go to all appointments, and call your doctor if you are having problems. It's also a good idea to know your test results and keep a list of the medicines you take. How can you care for yourself at home? ?Staying healthy  ? · Do not smoke. This is the most important step you can take to prevent more damage to your lungs. If you need help quitting, talk to your doctor about stop-smoking programs and medicines. These can increase your chances of quitting for good. ? · Avoid colds and flu. Get a pneumococcal vaccine shot. If you have had one before, ask your doctor whether you need a second dose. Get the flu vaccine every fall. If you must be around people with colds or the flu, wash your hands often. ? · Avoid secondhand smoke, air pollution, and high altitudes. Also avoid cold, dry air and hot, humid air. Stay at home with your windows closed when air pollution is bad. ?Medicines and oxygen therapy  ? · Take your medicines exactly as prescribed. Call your doctor if you think you are having a problem with your medicine. ? · You may be taking medicines such as:  ¨ Bronchodilators. These help open your airways and make breathing easier. Bronchodilators are either short-acting (work for 6 to 9 hours) or long-acting (work for 24 hours).  You inhale most Medication:    DULoxetine (CYMBALTA) 20 MG capsule     The original prescription was discontinued on 6/19/2024 by Cathie Muñoz PA-C for the following reason: Therapy Completed.     Medication refill denied due to discontinued   bronchodilators, so they start to act quickly. Always carry your quick-relief inhaler with you in case you need it while you are away from home. ¨ Corticosteroids (prednisone, budesonide). These reduce airway inflammation. They come in pill or inhaled form. You must take these medicines every day for them to work well. ? · A spacer may help you get more inhaled medicine to your lungs. Ask your doctor or pharmacist if a spacer is right for you. If it is, ask how to use it properly. ? · Do not take any vitamins, over-the-counter medicine, or herbal products without talking to your doctor first.   ? · If your doctor prescribed antibiotics, take them as directed. Do not stop taking them just because you feel better. You need to take the full course of antibiotics. ? · Oxygen therapy boosts the amount of oxygen in your blood and helps you breathe easier. Use the flow rate your doctor has recommended, and do not change it without talking to your doctor first.   Activity  ? · Get regular exercise. Walking is an easy way to get exercise. Start out slowly, and walk a little more each day. ? · Pay attention to your breathing. You are exercising too hard if you cannot talk while you are exercising. ? · Take short rest breaks when doing household chores and other activities. ? · Learn breathing methods-such as breathing through pursed lips-to help you become less short of breath. ? · If your doctor has not set you up with a pulmonary rehabilitation program, talk to him or her about whether rehab is right for you. Rehab includes exercise programs, education about your disease and how to manage it, help with diet and other changes, and emotional support. Diet  ? · Eat regular, healthy meals. Use bronchodilators about 1 hour before you eat to make it easier to eat. Eat several small meals instead of three large ones. Drink beverages at the end of the meal. Avoid foods that are hard to chew.    ? · Eat foods that contain protein so that you do not lose muscle mass. ? · Talk with your doctor if you gain too much weight or if you lose weight without trying. ?Mental health  ? · Talk to your family, friends, or a therapist about your feelings. It is normal to feel frightened, angry, hopeless, helpless, and even guilty. Talking openly about bad feelings can help you cope. If these feelings last, talk to your doctor. When should you call for help? Call 911 anytime you think you may need emergency care. For example, call if:  ? · You have severe trouble breathing. ?Call your doctor now or seek immediate medical care if:  ? · You have new or worse trouble breathing. ? · You cough up blood. ? · You have a fever. ? Watch closely for changes in your health, and be sure to contact your doctor if:  ? · You cough more deeply or more often, especially if you notice more mucus or a change in the color of your mucus. ? · You have new or worse swelling in your legs or belly. ? · You are not getting better as expected. Where can you learn more? Go to http://jose-katarzyna.info/. Juan Ramon Teresa in the search box to learn more about \"Chronic Obstructive Pulmonary Disease (COPD): Care Instructions. \"  Current as of: May 12, 2017  Content Version: 11.4  © 3995-3022 [employer]. Care instructions adapted under license by [employer] (which disclaims liability or warranty for this information). If you have questions about a medical condition or this instruction, always ask your healthcare professional. [place] [address] any warranty or liability for your use of this information. Pneumonia: Care Instructions  Your Care Instructions    Pneumonia is an infection of the lungs. Most cases are caused by infections from bacteria or viruses. Pneumonia may be mild or very severe. If it is caused by bacteria, you will be treated with antibiotics.  It may take a few weeks to a few months to recover fully from pneumonia, depending on how sick you were and whether your overall health is good. Follow-up care is a key part of your treatment and safety. Be sure to make and go to all appointments, and call your doctor if you are having problems. It's also a good idea to know your test results and keep a list of the medicines you take. How can you care for yourself at home? · Take your antibiotics exactly as directed. Do not stop taking the medicine just because you are feeling better. You need to take the full course of antibiotics. · Take your medicines exactly as prescribed. Call your doctor if you think you are having a problem with your medicine. · Get plenty of rest and sleep. You may feel weak and tired for a while, but your energy level will improve with time. · To prevent dehydration, drink plenty of fluids, enough so that your urine is light yellow or clear like water. Choose water and other caffeine-free clear liquids until you feel better. If you have kidney, heart, or liver disease and have to limit fluids, talk with your doctor before you increase the amount of fluids you drink. · Take care of your cough so you can rest. A cough that brings up mucus from your lungs is common with pneumonia. It is one way your body gets rid of the infection. But if coughing keeps you from resting or causes severe fatigue and chest-wall pain, talk to your doctor. He or she may suggest that you take a medicine to reduce the cough. · Use a vaporizer or humidifier to add moisture to your bedroom. Follow the directions for cleaning the machine. · Do not smoke or allow others to smoke around you. Smoke will make your cough last longer. If you need help quitting, talk to your doctor about stop-smoking programs and medicines. These can increase your chances of quitting for good.   · Take an over-the-counter pain medicine, such as acetaminophen (Tylenol), ibuprofen (Advil, Motrin), or naproxen (Terese). Read and follow all instructions on the label. · Do not take two or more pain medicines at the same time unless the doctor told you to. Many pain medicines have acetaminophen, which is Tylenol. Too much acetaminophen (Tylenol) can be harmful. · If you were given a spirometer to measure how well your lungs are working, use it as instructed. This can help your doctor tell how your recovery is going. · To prevent pneumonia in the future, talk to your doctor about getting a flu vaccine (once a year) and a pneumococcal vaccine (one time only for most people). When should you call for help? Call 911 anytime you think you may need emergency care. For example, call if:  ? · You have severe trouble breathing. ?Call your doctor now or seek immediate medical care if:  ? · You cough up dark brown or bloody mucus (sputum). ? · You have new or worse trouble breathing. ? · You are dizzy or lightheaded, or you feel like you may faint. ? Watch closely for changes in your health, and be sure to contact your doctor if:  ? · You have a new or higher fever. ? · You are coughing more deeply or more often. ? · You are not getting better after 2 days (48 hours). ? · You do not get better as expected. Where can you learn more? Go to http://jose-katarzyna.info/. Enter [id number] in the search box to learn more about \"Pneumonia: Care Instructions. \"  Current as of: May 12, 2017  Content Version: 11.4  © 0361-4901 [employer]. Care instructions adapted under license by [employer] (which disclaims liability or warranty for this information). If you have questions about a medical condition or this instruction, always ask your healthcare professional. [address] any warranty or liability for your use of this information.     DISCHARGE SUMMARY from Nurse    PATIENT INSTRUCTIONS:    After general anesthesia or intravenous sedation, for 24 hours or while taking prescription Narcotics:  · Limit your activities  · Do not drive and operate hazardous machinery  · Do not make important personal or business decisions  · Do  not drink alcoholic beverages  · If you have not urinated within 8 hours after discharge, please contact your surgeon on call. Report the following to your surgeon:  · Excessive pain, swelling, redness or odor of or around the surgical area  · Temperature over 100.5  · Nausea and vomiting lasting longer than 4 hours or if unable to take medications  · Any signs of decreased circulation or nerve impairment to extremity: change in color, persistent  numbness, tingling, coldness or increase pain  · Any questions    What to do at Home:  *  Please give a list of your current medications to your Primary Care Provider. *  Please update this list whenever your medications are discontinued, doses are      changed, or new medications (including over-the-counter products) are added. *  Please carry medication information at all times in case of emergency situations. These are general instructions for a healthy lifestyle:    No smoking/ No tobacco products/ Avoid exposure to second hand smoke  Surgeon General's Warning:  Quitting smoking now greatly reduces serious risk to your health. Obesity, smoking, and sedentary lifestyle greatly increases your risk for illness    A healthy diet, regular physical exercise & weight monitoring are important for maintaining a healthy lifestyle    You may be retaining fluid if you have a history of heart failure or if you experience any of the following symptoms:  Weight gain of 3 pounds or more overnight or 5 pounds in a week, increased swelling in our hands or feet or shortness of breath while lying flat in bed. Please call your doctor as soon as you notice any of these symptoms; do not wait until your next office visit.     Recognize signs and symptoms of STROKE:    F-face looks uneven    A-arms unable to move or move unevenly    S-speech slurred or non-existent    T-time-call 911 as soon as signs and symptoms begin-DO NOT go       Back to bed or wait to see if you get better-TIME IS BRAIN. Warning Signs of HEART ATTACK     Call 911 if you have these symptoms:   Chest discomfort. Most heart attacks involve discomfort in the center of the chest that lasts more than a few minutes, or that goes away and comes back. It can feel like uncomfortable pressure, squeezing, fullness, or pain.  Discomfort in other areas of the upper body. Symptoms can include pain or discomfort in one or both arms, the back, neck, jaw, or stomach.  Shortness of breath with or without chest discomfort.  Other signs may include breaking out in a cold sweat, nausea, or lightheadedness. Don't wait more than five minutes to call 911 - MINUTES MATTER! Fast action can save your life. Calling 911 is almost always the fastest way to get lifesaving treatment. Emergency Medical Services staff can begin treatment when they arrive -- up to an hour sooner than if someone gets to the hospital by car. The discharge information has been reviewed with the patient. The patient verbalized understanding. Discharge medications reviewed with the patient and appropriate educational materials and side effects teaching were provided.   ___________________________________________________________________________________________________________________________________